# Patient Record
Sex: FEMALE | Race: WHITE | NOT HISPANIC OR LATINO | Employment: OTHER | URBAN - NONMETROPOLITAN AREA
[De-identification: names, ages, dates, MRNs, and addresses within clinical notes are randomized per-mention and may not be internally consistent; named-entity substitution may affect disease eponyms.]

---

## 2017-01-02 PROBLEM — I10 ESSENTIAL HYPERTENSION: Status: ACTIVE | Noted: 2017-01-02

## 2017-01-02 PROBLEM — E03.9 ACQUIRED HYPOTHYROIDISM: Status: ACTIVE | Noted: 2017-01-02

## 2017-02-09 DIAGNOSIS — C50.211 MALIGNANT NEOPLASM OF UPPER-INNER QUADRANT OF RIGHT FEMALE BREAST (HCC): Primary | ICD-10-CM

## 2017-02-10 ENCOUNTER — LAB (OUTPATIENT)
Dept: ONCOLOGY | Facility: CLINIC | Age: 62
End: 2017-02-10

## 2017-02-10 ENCOUNTER — OFFICE VISIT (OUTPATIENT)
Dept: ONCOLOGY | Facility: CLINIC | Age: 62
End: 2017-02-10

## 2017-02-10 VITALS
DIASTOLIC BLOOD PRESSURE: 72 MMHG | SYSTOLIC BLOOD PRESSURE: 122 MMHG | TEMPERATURE: 98.2 F | WEIGHT: 139.1 LBS | HEIGHT: 59 IN | OXYGEN SATURATION: 96 % | HEART RATE: 84 BPM | RESPIRATION RATE: 16 BRPM | BODY MASS INDEX: 28.04 KG/M2

## 2017-02-10 DIAGNOSIS — C50.211 MALIGNANT NEOPLASM OF UPPER-INNER QUADRANT OF RIGHT FEMALE BREAST (HCC): Primary | ICD-10-CM

## 2017-02-10 DIAGNOSIS — Z51.11 ENCOUNTER FOR ANTINEOPLASTIC CHEMOTHERAPY: ICD-10-CM

## 2017-02-10 DIAGNOSIS — C50.211 MALIGNANT NEOPLASM OF UPPER-INNER QUADRANT OF RIGHT FEMALE BREAST (HCC): ICD-10-CM

## 2017-02-10 DIAGNOSIS — I10 ESSENTIAL HYPERTENSION: Primary | ICD-10-CM

## 2017-02-10 LAB
ALBUMIN SERPL-MCNC: 4.5 G/DL (ref 3.5–5)
ALBUMIN/GLOB SERPL: 1.5 G/DL
ALP SERPL-CCNC: 69 U/L (ref 38–126)
ALT SERPL W P-5'-P-CCNC: 26 U/L (ref 9–52)
ANION GAP SERPL CALCULATED.3IONS-SCNC: 14 MMOL/L
AST SERPL-CCNC: 24 U/L (ref 5–40)
AUTO MIXED CELLS #: 0.5 10*3/UL (ref 0.1–1.5)
AUTO MIXED CELLS %: 4 % (ref 0.2–15.1)
BILIRUB SERPL-MCNC: 0.4 MG/DL (ref 0.2–1.3)
BUN BLD-MCNC: 24 MG/DL (ref 7–26)
BUN/CREAT SERPL: 30 (ref 7–25)
CALCIUM SPEC-SCNC: 9.4 MG/DL (ref 8.4–10.2)
CHLORIDE SERPL-SCNC: 102 MMOL/L (ref 98–107)
CO2 SERPL-SCNC: 29 MMOL/L (ref 22–30)
CREAT BLD-MCNC: 0.8 MG/DL (ref 0.7–1.4)
ERYTHROCYTE [DISTWIDTH] IN BLOOD BY AUTOMATED COUNT: 13.6 % (ref 11.5–14.5)
GFR SERPL CREATININE-BSD FRML MDRD: 72 ML/MIN/1.73
GLOBULIN UR ELPH-MCNC: 3 GM/DL
GLUCOSE BLD-MCNC: 119 MG/DL (ref 75–110)
HCT VFR BLD AUTO: 42.4 % (ref 37–47)
HGB BLD-MCNC: 12.9 G/DL (ref 12–16)
LYMPHOCYTES # BLD AUTO: 2.2 10*3/MM3 (ref 0.8–7)
LYMPHOCYTES NFR BLD AUTO: 18.2 % (ref 10–58.5)
MCH RBC QN AUTO: 30.2 PG (ref 27–31)
MCHC RBC AUTO-ENTMCNC: 30.4 G/DL (ref 33–37)
MCV RBC AUTO: 99.4 FL (ref 81–99)
NEUTROPHILS # BLD AUTO: 9.4 10*3/MM3 (ref 2–7.8)
NEUTROPHILS NFR BLD AUTO: 77.8 % (ref 37–92)
PLATELET # BLD AUTO: 287 10*3/MM3 (ref 130–400)
PMV BLD AUTO: 8.6 FL (ref 6–12)
POTASSIUM BLD-SCNC: 4.2 MMOL/L (ref 3.6–5)
PROT SERPL-MCNC: 7.5 G/DL (ref 6.3–8.2)
RBC # BLD AUTO: 4.27 10*6/MM3 (ref 4.2–5.4)
SODIUM BLD-SCNC: 145 MMOL/L (ref 137–145)
WBC NRBC COR # BLD: 12.1 10*3/MM3 (ref 4.8–10.8)

## 2017-02-10 PROCEDURE — 36415 COLL VENOUS BLD VENIPUNCTURE: CPT | Performed by: INTERNAL MEDICINE

## 2017-02-10 PROCEDURE — 85025 COMPLETE CBC W/AUTO DIFF WBC: CPT | Performed by: INTERNAL MEDICINE

## 2017-02-10 PROCEDURE — 80053 COMPREHEN METABOLIC PANEL: CPT | Performed by: INTERNAL MEDICINE

## 2017-02-10 PROCEDURE — 99213 OFFICE O/P EST LOW 20 MIN: CPT | Performed by: INTERNAL MEDICINE

## 2017-02-10 RX ORDER — CEFDINIR 300 MG/1
300 CAPSULE ORAL 2 TIMES DAILY
COMMUNITY
End: 2017-03-29

## 2017-02-10 RX ORDER — METHYLPREDNISOLONE 4 MG/1
4 TABLET ORAL DAILY
COMMUNITY
End: 2017-08-04

## 2017-02-10 NOTE — PROGRESS NOTES
[facility]  [facility] & [facility]Y        Subjective  Patient now doing a lot better but had a recent bout with pneumonia 3 weeks ago.  It was treated with Levaquin which she  remains on because of blocked ears and vertigo.  She may have to go back to work Monday.  Her daughter is a nurse practitioner down in primary care.  Her blood counts look good with a hemoglobin normal and a slight elevation in her white count.  The consistent with these recent infections.  We will get tumor markers as she desired to have that done 2.   was normal in the upper ranges.  Otherwise her breast cancer is under control and only her risk of infection seems to be a current issue.  She has close medical care so of course we are to be able to keep a close eye on her risk of recurrence.  VISIT DIAGNOSIS:   Encounter Diagnoses   Name Primary?   • Essential hypertension Yes   • Malignant neoplasm of upper-inner quadrant of right female breast    • Encounter for antineoplastic chemotherapy        REASON FOR VISIT:   No chief complaint on file.       HEMATOLOGY / ONCOLOGY HISTORY:    No history exists.       Cancer Staging Information:  No matching staging information was found for the patient.      INTERVAL HISTORY  Patient ID: Dayami Rossi is a 61 y.o. year old female is here today for follow-up.      Past Medical History:   Past Medical History   Diagnosis Date   • Arthritis    • Body mass index (BMI) of 24.0-24.9 in adult    • Breast cancer, right    • Depression    • Disease of thyroid gland    • Encounter for antineoplastic chemotherapy 9/18/2016   • H/O mammogram      bilateral - [facility]   • Hypertension    • Malignant neoplasm of upper-inner quadrant of right female breast 9/18/2016     Past Surgical History:   Past Surgical History   Procedure Laterality Date   • Breast surgery Right 08/20/2015      lumpectomy   • Breast surgery Right 09/03/2015     mastectomy   • Thyroid surgery     • Tubal  abdominal ligation     • Mastectomy Right      modified radical, Garry     Social History:   Social History     Social History   • Marital status:      Spouse name: N/A   • Number of children: N/A   • Years of education: N/A     Occupational History   • Not on file.     Social History Main Topics   • Smoking status: Current Every Day Smoker     Packs/day: 0.50     Years: 45.00     Types: Cigarettes   • Smokeless tobacco: Never Used   • Alcohol use No   • Drug use: No   • Sexual activity: Defer     Other Topics Concern   • Not on file     Social History Narrative     Family History:   Family History   Problem Relation Age of Onset   • Cancer Mother      bladder cancer   • Stroke Mother    • Hypertension Mother    • Thyroid disease Mother    • Cancer Father      prostate cancer   • Thyroid disease Sister    • Cancer Brother      lung cancer   • No Known Problems Daughter    • No Known Problems Son    • Heart disease Brother      heart attack   • No Known Problems Brother    • Thyroid disease Sister    • No Known Problems Maternal Grandmother    • No Known Problems Maternal Grandfather    • No Known Problems Paternal Grandmother    • Cancer Paternal Grandfather      prostate   • No Known Problems Brother        Review of Systems   Constitutional: Positive for fatigue.   HENT: Positive for congestion and ear pain.    Eyes: Positive for pain.   Respiratory: Positive for cough, shortness of breath and wheezing.    Gastrointestinal: Positive for nausea.   Musculoskeletal: Positive for joint swelling and neck pain.   Neurological: Positive for dizziness and weakness.   Psychiatric/Behavioral: Positive for sleep disturbance. The patient is nervous/anxious.    All other systems reviewed and are negative.       Performance Status:  Asymptomatic    Medications:    Current Outpatient Prescriptions   Medication Sig Dispense Refill   • amLODIPine (NORVASC) 5 MG tablet Take 5 mg by mouth daily.     • doxycycline  (VIBRAMYCIN) 100 MG capsule Take 100 mg by mouth 2 (Two) Times a Day.     • HYDROcodone-acetaminophen (NORCO)  MG per tablet Take 1 tablet by mouth every 8 (eight) hours as needed for moderate pain (4-6).     • letrozole (FEMARA) 2.5 MG tablet Take  by mouth daily.     • levoFLOXacin (LEVAQUIN) 500 MG tablet Take 500 mg by mouth Daily.     • levothyroxine (SYNTHROID, LEVOTHROID) 100 MCG tablet Take 100 mcg by mouth daily.     • nicotine (NICODERM CQ) 21 MG/24HR patch Place 1 patch on the skin every day.     • PARoxetine (PAXIL) 20 MG tablet Take 20 mg by mouth every morning.       No current facility-administered medications for this visit.        ALLERGIES:    Allergies   Allergen Reactions   • Erythromycin Base    • Penicillins        Objective      There were no vitals filed for this visit.  There were no vitals taken for this visit.    No flowsheet data found.      General Appearance:    Alert, cooperative, no distress, appears stated age   Head:    Normocephalic, without obvious abnormality, atraumatic   Eyes:    PERRL, conjunctiva/corneas clear, EOM's intact, fundi     benign, both eyes   Ears:    Normal TM's and external ear canals, both ears   Nose:   Nares normal, septum midline, mucosa normal, no drainage     or sinus tenderness   Throat:   Lips, mucosa, and tongue normal; teeth and gums normal   Neck:   Supple, symmetrical, trachea midline, no adenopathy;     thyroid:  no enlargement/tenderness/nodules; no carotid    bruit or JVD   Back:     Symmetric, no curvature, ROM normal, no CVA tenderness   Lungs:     Clear to auscultation bilaterally, respirations unlabored,rare cough and certainly no sign of SOB   Chest Wall:    No tenderness or deformity    Heart:    Regular rate and rhythm, S1 and S2 normal, no murmur, rub    or gallop   Abdomen:     Soft, non-tender, bowel sounds active all four quadrants,     no masses, no organomegaly   Extremities:   Extremities normal, atraumatic, no cyanosis or  edema   Pulses:   2+ and symmetric all extremities   Skin:   Skin color, texture, turgor normal, no rashes or lesions   Lymph nodes:   Cervical, supraclavicular, and axillary nodes normal   Neurologic:   CNII-XII intact, normal strength, sensation and reflexes     throughout       RECENT LABS:    No results found for: AMPHETSCREEN, BARBITSCNUR, LABBENZSCN, COCAINEUR, OSMOLALITY, PCPUR, THCSCNINP, LABMETHSCN     Results for orders placed or performed in visit on 11/11/16   CA 27.29 (Serial Monitor)   Result Value Ref Range    CA 27.29 35.8 0.0 - 38.6 U/mL   CEA   Result Value Ref Range    CEA 2.19 0.00 - 5.00 ng/mL           RADIOLOGY:  No results found.      Assessment/Plan     Patient Active Problem List   Diagnosis   • Malignant neoplasm of upper-inner quadrant of right female breast   • Encounter for antineoplastic chemotherapy   • Acquired hypothyroidism   • Essential hypertension        Diagnoses and all orders for this visit:    Essential hypertension    Malignant neoplasm of upper-inner quadrant of right female breast    Encounter for antineoplastic chemotherapy     Her problem with a possible lung infection seems to be waning.          Garry Remy MD    2/10/2017    1:07 PM

## 2017-02-11 LAB
CANCER AG27-29 SERPL-ACNC: 29.5 U/ML (ref 0–38.6)
CEA SERPL-MCNC: 2.01 NG/ML (ref 0–5)

## 2017-03-09 RX ORDER — AMLODIPINE BESYLATE 5 MG/1
5 TABLET ORAL DAILY
Qty: 5 TABLET | Refills: 1 | Status: SHIPPED | OUTPATIENT
Start: 2017-03-09 | End: 2017-03-13 | Stop reason: SDUPTHER

## 2017-03-09 NOTE — TELEPHONE ENCOUNTER
Received fax requesting refilling lasix from [facility], formerly [facility].  Discussed with Dr. Remy and order given to hold lasix for now.

## 2017-03-13 RX ORDER — AMLODIPINE BESYLATE 5 MG/1
5 TABLET ORAL DAILY
Qty: 30 TABLET | Refills: 1 | Status: SHIPPED | OUTPATIENT
Start: 2017-03-13 | End: 2017-04-04 | Stop reason: SDUPTHER

## 2017-03-29 ENCOUNTER — OFFICE VISIT (OUTPATIENT)
Dept: RADIATION ONCOLOGY | Facility: HOSPITAL | Age: 62
End: 2017-03-29

## 2017-03-29 VITALS
HEIGHT: 59 IN | SYSTOLIC BLOOD PRESSURE: 122 MMHG | DIASTOLIC BLOOD PRESSURE: 74 MMHG | WEIGHT: 139 LBS | BODY MASS INDEX: 28.02 KG/M2

## 2017-03-29 DIAGNOSIS — C50.211 MALIGNANT NEOPLASM OF UPPER-INNER QUADRANT OF RIGHT FEMALE BREAST (HCC): Primary | ICD-10-CM

## 2017-03-29 DIAGNOSIS — Z92.3 HX OF RADIATION THERAPY: ICD-10-CM

## 2017-03-29 DIAGNOSIS — Z08 ENCOUNTER FOR FOLLOW-UP EXAMINATION AFTER COMPLETED TREATMENT FOR CANCER: ICD-10-CM

## 2017-04-04 RX ORDER — AMLODIPINE BESYLATE 5 MG/1
5 TABLET ORAL DAILY
Qty: 90 TABLET | Refills: 3 | Status: SHIPPED | OUTPATIENT
Start: 2017-04-04

## 2017-04-17 PROBLEM — Z92.3 HX OF RADIATION THERAPY: Status: ACTIVE | Noted: 2017-04-17

## 2017-04-17 PROBLEM — Z08 ENCOUNTER FOR FOLLOW-UP EXAMINATION AFTER COMPLETED TREATMENT FOR CANCER: Status: ACTIVE | Noted: 2017-04-17

## 2017-04-19 ENCOUNTER — TELEPHONE (OUTPATIENT)
Dept: NEUROSURGERY | Age: 62
End: 2017-04-19

## 2017-05-11 ENCOUNTER — OFFICE VISIT (OUTPATIENT)
Dept: NEUROSURGERY | Age: 62
End: 2017-05-11
Payer: COMMERCIAL

## 2017-05-11 ENCOUNTER — PREP FOR PROCEDURE (OUTPATIENT)
Dept: NEUROSURGERY | Age: 62
End: 2017-05-11

## 2017-05-11 VITALS
SYSTOLIC BLOOD PRESSURE: 120 MMHG | DIASTOLIC BLOOD PRESSURE: 68 MMHG | WEIGHT: 144 LBS | HEIGHT: 60 IN | BODY MASS INDEX: 28.27 KG/M2 | OXYGEN SATURATION: 97 % | HEART RATE: 94 BPM

## 2017-05-11 DIAGNOSIS — M51.36 DDD (DEGENERATIVE DISC DISEASE), LUMBAR: ICD-10-CM

## 2017-05-11 DIAGNOSIS — M48.061 FORAMINAL STENOSIS OF LUMBAR REGION: Primary | ICD-10-CM

## 2017-05-11 DIAGNOSIS — M79.604 RIGHT LEG PAIN: ICD-10-CM

## 2017-05-11 DIAGNOSIS — R29.6 FREQUENT FALLS: ICD-10-CM

## 2017-05-11 DIAGNOSIS — M54.16 CHRONIC LUMBAR RADICULOPATHY: ICD-10-CM

## 2017-05-11 PROCEDURE — 99204 OFFICE O/P NEW MOD 45 MIN: CPT | Performed by: NEUROLOGICAL SURGERY

## 2017-05-11 RX ORDER — CALCIUM CARBONATE 500(1250)
500 TABLET ORAL 2 TIMES DAILY
COMMUNITY

## 2017-05-11 RX ORDER — LISINOPRIL 20 MG/1
20 TABLET ORAL DAILY
COMMUNITY

## 2017-05-11 RX ORDER — DULOXETIN HYDROCHLORIDE 30 MG/1
30 CAPSULE, DELAYED RELEASE ORAL DAILY
COMMUNITY

## 2017-05-11 RX ORDER — HYDROCODONE BITARTRATE AND ACETAMINOPHEN 10; 325 MG/1; MG/1
1 TABLET ORAL EVERY 6 HOURS PRN
Status: ON HOLD | COMMUNITY
End: 2017-05-24

## 2017-05-11 RX ORDER — AMLODIPINE BESYLATE 5 MG/1
5 TABLET ORAL DAILY
COMMUNITY

## 2017-05-11 RX ORDER — TEMAZEPAM 30 MG/1
30 CAPSULE ORAL DAILY
COMMUNITY

## 2017-05-11 RX ORDER — ALPRAZOLAM 1 MG/1
1 TABLET ORAL 2 TIMES DAILY
COMMUNITY

## 2017-05-11 RX ORDER — SODIUM CHLORIDE 0.9 % (FLUSH) 0.9 %
10 SYRINGE (ML) INJECTION PRN
Status: CANCELLED | OUTPATIENT
Start: 2017-05-11

## 2017-05-11 RX ORDER — SODIUM CHLORIDE, SODIUM LACTATE, POTASSIUM CHLORIDE, CALCIUM CHLORIDE 600; 310; 30; 20 MG/100ML; MG/100ML; MG/100ML; MG/100ML
INJECTION, SOLUTION INTRAVENOUS CONTINUOUS
Status: CANCELLED | OUTPATIENT
Start: 2017-05-11

## 2017-05-11 RX ORDER — SODIUM CHLORIDE 0.9 % (FLUSH) 0.9 %
10 SYRINGE (ML) INJECTION EVERY 12 HOURS SCHEDULED
Status: CANCELLED | OUTPATIENT
Start: 2017-05-11

## 2017-05-11 RX ORDER — SIMVASTATIN 10 MG
10 TABLET ORAL DAILY
COMMUNITY

## 2017-05-11 RX ORDER — LETROZOLE 2.5 MG/1
2.5 TABLET, FILM COATED ORAL DAILY
COMMUNITY
End: 2020-01-21 | Stop reason: SDUPTHER

## 2017-05-11 ASSESSMENT — ENCOUNTER SYMPTOMS
BACK PAIN: 1
HEMOPTYSIS: 0
SPUTUM PRODUCTION: 0
EYES NEGATIVE: 1
WHEEZING: 0
COUGH: 1
SHORTNESS OF BREATH: 0
GASTROINTESTINAL NEGATIVE: 1

## 2017-05-22 DIAGNOSIS — C50.211 MALIGNANT NEOPLASM OF UPPER-INNER QUADRANT OF RIGHT FEMALE BREAST (HCC): Primary | ICD-10-CM

## 2017-05-24 ENCOUNTER — HOSPITAL ENCOUNTER (OUTPATIENT)
Age: 62
Setting detail: OUTPATIENT SURGERY
Discharge: HOME | End: 2017-05-24
Attending: NEUROLOGICAL SURGERY | Admitting: NEUROLOGICAL SURGERY
Payer: COMMERCIAL

## 2017-05-24 ENCOUNTER — APPOINTMENT (OUTPATIENT)
Dept: GENERAL RADIOLOGY | Age: 62
End: 2017-05-24
Attending: NEUROLOGICAL SURGERY
Payer: COMMERCIAL

## 2017-05-24 ENCOUNTER — ANESTHESIA EVENT (OUTPATIENT)
Dept: OPERATING ROOM | Age: 62
End: 2017-05-24
Payer: COMMERCIAL

## 2017-05-24 ENCOUNTER — ANESTHESIA (OUTPATIENT)
Dept: OPERATING ROOM | Age: 62
End: 2017-05-24
Payer: COMMERCIAL

## 2017-05-24 VITALS
SYSTOLIC BLOOD PRESSURE: 118 MMHG | RESPIRATION RATE: 8 BRPM | TEMPERATURE: 98.6 F | DIASTOLIC BLOOD PRESSURE: 50 MMHG | OXYGEN SATURATION: 100 %

## 2017-05-24 VITALS
BODY MASS INDEX: 28.22 KG/M2 | DIASTOLIC BLOOD PRESSURE: 60 MMHG | HEIGHT: 59 IN | OXYGEN SATURATION: 97 % | RESPIRATION RATE: 16 BRPM | WEIGHT: 140 LBS | SYSTOLIC BLOOD PRESSURE: 102 MMHG | TEMPERATURE: 98 F | HEART RATE: 77 BPM

## 2017-05-24 LAB
ABO/RH: NORMAL
ANTIBODY SCREEN: NORMAL
GLUCOSE BLD-MCNC: 99 MG/DL (ref 70–99)
PERFORMED ON: NORMAL

## 2017-05-24 PROCEDURE — 2500000003 HC RX 250 WO HCPCS: Performed by: NEUROLOGICAL SURGERY

## 2017-05-24 PROCEDURE — 6360000002 HC RX W HCPCS

## 2017-05-24 PROCEDURE — 2580000003 HC RX 258: Performed by: NEUROLOGICAL SURGERY

## 2017-05-24 PROCEDURE — 86900 BLOOD TYPING SEROLOGIC ABO: CPT

## 2017-05-24 PROCEDURE — 86850 RBC ANTIBODY SCREEN: CPT

## 2017-05-24 PROCEDURE — 7100000011 HC PHASE II RECOVERY - ADDTL 15 MIN: Performed by: NEUROLOGICAL SURGERY

## 2017-05-24 PROCEDURE — 3600000015 HC SURGERY LEVEL 5 ADDTL 15MIN: Performed by: NEUROLOGICAL SURGERY

## 2017-05-24 PROCEDURE — 2720000001 HC MISC SURG SUPPLY STERILE $51-500: Performed by: NEUROLOGICAL SURGERY

## 2017-05-24 PROCEDURE — 7100000000 HC PACU RECOVERY - FIRST 15 MIN: Performed by: NEUROLOGICAL SURGERY

## 2017-05-24 PROCEDURE — 3700000001 HC ADD 15 MINUTES (ANESTHESIA): Performed by: NEUROLOGICAL SURGERY

## 2017-05-24 PROCEDURE — 2500000003 HC RX 250 WO HCPCS

## 2017-05-24 PROCEDURE — 63030 LAMOT DCMPRN NRV RT 1 LMBR: CPT | Performed by: NEUROLOGICAL SURGERY

## 2017-05-24 PROCEDURE — 3600000005 HC SURGERY LEVEL 5 BASE: Performed by: NEUROLOGICAL SURGERY

## 2017-05-24 PROCEDURE — 6360000002 HC RX W HCPCS: Performed by: NEUROLOGICAL SURGERY

## 2017-05-24 PROCEDURE — 72020 X-RAY EXAM OF SPINE 1 VIEW: CPT

## 2017-05-24 PROCEDURE — 7100000010 HC PHASE II RECOVERY - FIRST 15 MIN: Performed by: NEUROLOGICAL SURGERY

## 2017-05-24 PROCEDURE — 3209999900 FLUORO FOR SURGICAL PROCEDURES

## 2017-05-24 PROCEDURE — 36415 COLL VENOUS BLD VENIPUNCTURE: CPT

## 2017-05-24 PROCEDURE — 82948 REAGENT STRIP/BLOOD GLUCOSE: CPT

## 2017-05-24 PROCEDURE — 3700000000 HC ANESTHESIA ATTENDED CARE: Performed by: NEUROLOGICAL SURGERY

## 2017-05-24 PROCEDURE — 7100000001 HC PACU RECOVERY - ADDTL 15 MIN: Performed by: NEUROLOGICAL SURGERY

## 2017-05-24 PROCEDURE — 86901 BLOOD TYPING SEROLOGIC RH(D): CPT

## 2017-05-24 RX ORDER — FENTANYL CITRATE 50 UG/ML
50 INJECTION, SOLUTION INTRAMUSCULAR; INTRAVENOUS
Status: DISCONTINUED | OUTPATIENT
Start: 2017-05-24 | End: 2017-05-24 | Stop reason: HOSPADM

## 2017-05-24 RX ORDER — SODIUM CHLORIDE, SODIUM LACTATE, POTASSIUM CHLORIDE, CALCIUM CHLORIDE 600; 310; 30; 20 MG/100ML; MG/100ML; MG/100ML; MG/100ML
INJECTION, SOLUTION INTRAVENOUS CONTINUOUS
Status: DISCONTINUED | OUTPATIENT
Start: 2017-05-24 | End: 2017-05-24 | Stop reason: HOSPADM

## 2017-05-24 RX ORDER — LIDOCAINE HYDROCHLORIDE 10 MG/ML
1 INJECTION, SOLUTION EPIDURAL; INFILTRATION; INTRACAUDAL; PERINEURAL
Status: DISCONTINUED | OUTPATIENT
Start: 2017-05-24 | End: 2017-05-24 | Stop reason: HOSPADM

## 2017-05-24 RX ORDER — ROCURONIUM BROMIDE 10 MG/ML
INJECTION, SOLUTION INTRAVENOUS PRN
Status: DISCONTINUED | OUTPATIENT
Start: 2017-05-24 | End: 2017-05-24 | Stop reason: SDUPTHER

## 2017-05-24 RX ORDER — DIPHENHYDRAMINE HYDROCHLORIDE 50 MG/ML
12.5 INJECTION INTRAMUSCULAR; INTRAVENOUS
Status: DISCONTINUED | OUTPATIENT
Start: 2017-05-24 | End: 2017-05-24 | Stop reason: HOSPADM

## 2017-05-24 RX ORDER — DOCUSATE SODIUM 100 MG/1
100 CAPSULE, LIQUID FILLED ORAL 2 TIMES DAILY PRN
Qty: 30 CAPSULE | Refills: 1 | Status: SHIPPED | OUTPATIENT
Start: 2017-05-24

## 2017-05-24 RX ORDER — HYDROCODONE BITARTRATE AND ACETAMINOPHEN 10; 325 MG/1; MG/1
1 TABLET ORAL EVERY 6 HOURS PRN
Qty: 60 TABLET | Refills: 0 | Status: SHIPPED | OUTPATIENT
Start: 2017-05-24

## 2017-05-24 RX ORDER — MORPHINE SULFATE 4 MG/ML
4 INJECTION, SOLUTION INTRAMUSCULAR; INTRAVENOUS
Status: DISCONTINUED | OUTPATIENT
Start: 2017-05-24 | End: 2017-05-24 | Stop reason: HOSPADM

## 2017-05-24 RX ORDER — LIDOCAINE HYDROCHLORIDE 10 MG/ML
1 INJECTION, SOLUTION EPIDURAL; INFILTRATION; INTRACAUDAL; PERINEURAL ONCE
Status: COMPLETED | OUTPATIENT
Start: 2017-05-24 | End: 2017-05-24

## 2017-05-24 RX ORDER — MEPERIDINE HYDROCHLORIDE 50 MG/ML
12.5 INJECTION INTRAMUSCULAR; INTRAVENOUS; SUBCUTANEOUS EVERY 5 MIN PRN
Status: DISCONTINUED | OUTPATIENT
Start: 2017-05-24 | End: 2017-05-24 | Stop reason: HOSPADM

## 2017-05-24 RX ORDER — PROPOFOL 10 MG/ML
INJECTION, EMULSION INTRAVENOUS PRN
Status: DISCONTINUED | OUTPATIENT
Start: 2017-05-24 | End: 2017-05-24 | Stop reason: SDUPTHER

## 2017-05-24 RX ORDER — MORPHINE SULFATE 4 MG/ML
2 INJECTION, SOLUTION INTRAMUSCULAR; INTRAVENOUS EVERY 5 MIN PRN
Status: DISCONTINUED | OUTPATIENT
Start: 2017-05-24 | End: 2017-05-24 | Stop reason: HOSPADM

## 2017-05-24 RX ORDER — SODIUM CHLORIDE 0.9 % (FLUSH) 0.9 %
10 SYRINGE (ML) INJECTION PRN
Status: DISCONTINUED | OUTPATIENT
Start: 2017-05-24 | End: 2017-05-24 | Stop reason: HOSPADM

## 2017-05-24 RX ORDER — LABETALOL HYDROCHLORIDE 5 MG/ML
5 INJECTION, SOLUTION INTRAVENOUS EVERY 10 MIN PRN
Status: DISCONTINUED | OUTPATIENT
Start: 2017-05-24 | End: 2017-05-24 | Stop reason: HOSPADM

## 2017-05-24 RX ORDER — FENTANYL CITRATE 50 UG/ML
INJECTION, SOLUTION INTRAMUSCULAR; INTRAVENOUS PRN
Status: DISCONTINUED | OUTPATIENT
Start: 2017-05-24 | End: 2017-05-24 | Stop reason: SDUPTHER

## 2017-05-24 RX ORDER — DEXAMETHASONE SODIUM PHOSPHATE 10 MG/ML
INJECTION INTRAMUSCULAR; INTRAVENOUS PRN
Status: DISCONTINUED | OUTPATIENT
Start: 2017-05-24 | End: 2017-05-24 | Stop reason: SDUPTHER

## 2017-05-24 RX ORDER — SODIUM CHLORIDE 0.9 % (FLUSH) 0.9 %
10 SYRINGE (ML) INJECTION EVERY 12 HOURS SCHEDULED
Status: DISCONTINUED | OUTPATIENT
Start: 2017-05-24 | End: 2017-05-24 | Stop reason: HOSPADM

## 2017-05-24 RX ORDER — PROMETHAZINE HYDROCHLORIDE 25 MG/ML
6.25 INJECTION, SOLUTION INTRAMUSCULAR; INTRAVENOUS
Status: DISCONTINUED | OUTPATIENT
Start: 2017-05-24 | End: 2017-05-24 | Stop reason: HOSPADM

## 2017-05-24 RX ORDER — METHYLPREDNISOLONE ACETATE 40 MG/ML
INJECTION, SUSPENSION INTRA-ARTICULAR; INTRALESIONAL; INTRAMUSCULAR; SOFT TISSUE PRN
Status: DISCONTINUED | OUTPATIENT
Start: 2017-05-24 | End: 2017-05-24 | Stop reason: HOSPADM

## 2017-05-24 RX ORDER — MIDAZOLAM HYDROCHLORIDE 1 MG/ML
2 INJECTION INTRAMUSCULAR; INTRAVENOUS
Status: DISCONTINUED | OUTPATIENT
Start: 2017-05-24 | End: 2017-05-24 | Stop reason: HOSPADM

## 2017-05-24 RX ORDER — LIDOCAINE HYDROCHLORIDE 10 MG/ML
INJECTION, SOLUTION EPIDURAL; INFILTRATION; INTRACAUDAL; PERINEURAL PRN
Status: DISCONTINUED | OUTPATIENT
Start: 2017-05-24 | End: 2017-05-24 | Stop reason: SDUPTHER

## 2017-05-24 RX ORDER — SCOLOPAMINE TRANSDERMAL SYSTEM 1 MG/1
1 PATCH, EXTENDED RELEASE TRANSDERMAL
Status: DISCONTINUED | OUTPATIENT
Start: 2017-05-24 | End: 2017-05-24 | Stop reason: HOSPADM

## 2017-05-24 RX ORDER — HYDRALAZINE HYDROCHLORIDE 20 MG/ML
5 INJECTION INTRAMUSCULAR; INTRAVENOUS EVERY 10 MIN PRN
Status: DISCONTINUED | OUTPATIENT
Start: 2017-05-24 | End: 2017-05-24 | Stop reason: HOSPADM

## 2017-05-24 RX ORDER — MORPHINE SULFATE 4 MG/ML
4 INJECTION, SOLUTION INTRAMUSCULAR; INTRAVENOUS EVERY 5 MIN PRN
Status: DISCONTINUED | OUTPATIENT
Start: 2017-05-24 | End: 2017-05-24 | Stop reason: HOSPADM

## 2017-05-24 RX ORDER — METOCLOPRAMIDE HYDROCHLORIDE 5 MG/ML
10 INJECTION INTRAMUSCULAR; INTRAVENOUS
Status: DISCONTINUED | OUTPATIENT
Start: 2017-05-24 | End: 2017-05-24 | Stop reason: HOSPADM

## 2017-05-24 RX ORDER — ONDANSETRON 2 MG/ML
INJECTION INTRAMUSCULAR; INTRAVENOUS PRN
Status: DISCONTINUED | OUTPATIENT
Start: 2017-05-24 | End: 2017-05-24 | Stop reason: SDUPTHER

## 2017-05-24 RX ADMIN — PHENYLEPHRINE HYDROCHLORIDE 100 MCG: 10 INJECTION INTRAVENOUS at 12:42

## 2017-05-24 RX ADMIN — SUGAMMADEX 127 MG: 100 INJECTION, SOLUTION INTRAVENOUS at 13:45

## 2017-05-24 RX ADMIN — HYDROMORPHONE HYDROCHLORIDE 0.5 MG: 1 INJECTION, SOLUTION INTRAMUSCULAR; INTRAVENOUS; SUBCUTANEOUS at 13:55

## 2017-05-24 RX ADMIN — ROCURONIUM BROMIDE 20 MG: 10 INJECTION INTRAVENOUS at 12:42

## 2017-05-24 RX ADMIN — ROCURONIUM BROMIDE 10 MG: 10 INJECTION INTRAVENOUS at 12:17

## 2017-05-24 RX ADMIN — FENTANYL CITRATE 100 MCG: 50 INJECTION INTRAMUSCULAR; INTRAVENOUS at 12:04

## 2017-05-24 RX ADMIN — PROPOFOL 150 MG: 10 INJECTION, EMULSION INTRAVENOUS at 11:50

## 2017-05-24 RX ADMIN — ONDANSETRON HYDROCHLORIDE 4 MG: 2 INJECTION, SOLUTION INTRAVENOUS at 13:40

## 2017-05-24 RX ADMIN — SODIUM CHLORIDE, POTASSIUM CHLORIDE, SODIUM LACTATE AND CALCIUM CHLORIDE: 600; 310; 30; 20 INJECTION, SOLUTION INTRAVENOUS at 09:11

## 2017-05-24 RX ADMIN — LIDOCAINE HYDROCHLORIDE 50 MG: 10 INJECTION, SOLUTION EPIDURAL; INFILTRATION; INTRACAUDAL; PERINEURAL at 11:50

## 2017-05-24 RX ADMIN — DEXAMETHASONE SODIUM PHOSPHATE 10 MG: 10 INJECTION INTRAMUSCULAR; INTRAVENOUS at 12:01

## 2017-05-24 RX ADMIN — PHENYLEPHRINE HYDROCHLORIDE 100 MCG: 10 INJECTION INTRAVENOUS at 12:53

## 2017-05-24 RX ADMIN — ROCURONIUM BROMIDE 40 MG: 10 INJECTION INTRAVENOUS at 11:50

## 2017-05-24 RX ADMIN — LIDOCAINE HYDROCHLORIDE 1 ML: 10 INJECTION, SOLUTION EPIDURAL; INFILTRATION; INTRACAUDAL; PERINEURAL at 09:12

## 2017-05-24 RX ADMIN — FENTANYL CITRATE 100 MCG: 50 INJECTION INTRAMUSCULAR; INTRAVENOUS at 11:50

## 2017-05-24 RX ADMIN — PHENYLEPHRINE HYDROCHLORIDE 100 MCG: 10 INJECTION INTRAVENOUS at 13:20

## 2017-05-24 RX ADMIN — FENTANYL CITRATE 50 MCG: 50 INJECTION INTRAMUSCULAR; INTRAVENOUS at 11:59

## 2017-05-24 RX ADMIN — VANCOMYCIN HYDROCHLORIDE 1000 MG: 1 INJECTION, POWDER, LYOPHILIZED, FOR SOLUTION INTRAVENOUS at 12:02

## 2017-05-24 RX ADMIN — HYDROMORPHONE HYDROCHLORIDE 0.5 MG: 1 INJECTION, SOLUTION INTRAMUSCULAR; INTRAVENOUS; SUBCUTANEOUS at 13:35

## 2017-05-24 ASSESSMENT — PAIN - FUNCTIONAL ASSESSMENT
PAIN_FUNCTIONAL_ASSESSMENT: 0-10

## 2017-05-24 ASSESSMENT — ENCOUNTER SYMPTOMS: SHORTNESS OF BREATH: 1

## 2017-05-24 ASSESSMENT — PAIN DESCRIPTION - DESCRIPTORS: DESCRIPTORS: ACHING

## 2017-05-24 ASSESSMENT — PAIN DESCRIPTION - PAIN TYPE: TYPE: SURGICAL PAIN

## 2017-05-24 ASSESSMENT — PAIN DESCRIPTION - LOCATION: LOCATION: BACK

## 2017-05-24 ASSESSMENT — PAIN SCALES - GENERAL: PAINLEVEL_OUTOF10: 7

## 2017-05-30 ENCOUNTER — TELEPHONE (OUTPATIENT)
Dept: NEUROSURGERY | Age: 62
End: 2017-05-30

## 2017-05-31 ENCOUNTER — OFFICE VISIT (OUTPATIENT)
Dept: NEUROSURGERY | Age: 62
End: 2017-05-31

## 2017-05-31 VITALS
OXYGEN SATURATION: 97 % | SYSTOLIC BLOOD PRESSURE: 138 MMHG | BODY MASS INDEX: 27.21 KG/M2 | DIASTOLIC BLOOD PRESSURE: 86 MMHG | HEART RATE: 103 BPM | HEIGHT: 59 IN | WEIGHT: 135 LBS

## 2017-05-31 DIAGNOSIS — Z98.890 S/P LUMBAR MICRODISCECTOMY: Primary | ICD-10-CM

## 2017-05-31 PROCEDURE — 99024 POSTOP FOLLOW-UP VISIT: CPT | Performed by: NURSE PRACTITIONER

## 2017-05-31 ASSESSMENT — ENCOUNTER SYMPTOMS
GASTROINTESTINAL NEGATIVE: 1
HEMOPTYSIS: 0
EYES NEGATIVE: 1
SPUTUM PRODUCTION: 0
BACK PAIN: 0
COUGH: 0
SHORTNESS OF BREATH: 1
WHEEZING: 0

## 2017-06-14 ENCOUNTER — TELEPHONE (OUTPATIENT)
Dept: NEUROSURGERY | Age: 62
End: 2017-06-14

## 2017-06-26 ENCOUNTER — OFFICE VISIT (OUTPATIENT)
Dept: NEUROSURGERY | Age: 62
End: 2017-06-26

## 2017-06-26 VITALS
WEIGHT: 140 LBS | OXYGEN SATURATION: 97 % | HEIGHT: 59 IN | SYSTOLIC BLOOD PRESSURE: 132 MMHG | BODY MASS INDEX: 28.22 KG/M2 | HEART RATE: 99 BPM | DIASTOLIC BLOOD PRESSURE: 82 MMHG

## 2017-06-26 DIAGNOSIS — Z98.890 S/P LUMBAR MICRODISCECTOMY: Primary | ICD-10-CM

## 2017-06-26 PROCEDURE — 99024 POSTOP FOLLOW-UP VISIT: CPT | Performed by: NURSE PRACTITIONER

## 2017-06-26 RX ORDER — METHYLPREDNISOLONE 4 MG/1
4 TABLET ORAL
COMMUNITY

## 2017-06-26 RX ORDER — PAROXETINE HYDROCHLORIDE 20 MG/1
20 TABLET, FILM COATED ORAL
COMMUNITY

## 2017-06-26 RX ORDER — LEVOFLOXACIN 500 MG/1
500 TABLET, FILM COATED ORAL
COMMUNITY

## 2017-06-26 RX ORDER — NICOTINE 21 MG/24HR
1 PATCH, TRANSDERMAL 24 HOURS TRANSDERMAL
COMMUNITY
End: 2017-06-26

## 2017-07-06 DIAGNOSIS — C50.211 MALIGNANT NEOPLASM OF UPPER-INNER QUADRANT OF RIGHT FEMALE BREAST (HCC): Primary | ICD-10-CM

## 2017-07-11 ENCOUNTER — TELEPHONE (OUTPATIENT)
Dept: NEUROLOGY | Age: 62
End: 2017-07-11

## 2017-07-19 RX ORDER — LETROZOLE 2.5 MG/1
2.5 TABLET, FILM COATED ORAL DAILY
Qty: 90 TABLET | Refills: 3 | Status: SHIPPED | OUTPATIENT
Start: 2017-07-19

## 2017-08-03 DIAGNOSIS — C50.211 MALIGNANT NEOPLASM OF UPPER-INNER QUADRANT OF RIGHT FEMALE BREAST (HCC): Primary | ICD-10-CM

## 2017-08-04 ENCOUNTER — LAB (OUTPATIENT)
Dept: ONCOLOGY | Facility: CLINIC | Age: 62
End: 2017-08-04

## 2017-08-04 ENCOUNTER — OFFICE VISIT (OUTPATIENT)
Dept: ONCOLOGY | Facility: CLINIC | Age: 62
End: 2017-08-04

## 2017-08-04 VITALS
BODY MASS INDEX: 27.8 KG/M2 | TEMPERATURE: 97.6 F | HEIGHT: 59 IN | SYSTOLIC BLOOD PRESSURE: 124 MMHG | DIASTOLIC BLOOD PRESSURE: 76 MMHG | HEART RATE: 95 BPM | OXYGEN SATURATION: 96 % | WEIGHT: 137.9 LBS | RESPIRATION RATE: 17 BRPM

## 2017-08-04 DIAGNOSIS — C50.211 MALIGNANT NEOPLASM OF UPPER-INNER QUADRANT OF RIGHT FEMALE BREAST (HCC): Primary | ICD-10-CM

## 2017-08-04 LAB
ALBUMIN SERPL-MCNC: 4.8 G/DL (ref 3.5–5)
ALBUMIN/GLOB SERPL: 1.7 G/DL
ALP SERPL-CCNC: 96 U/L (ref 38–126)
ALT SERPL W P-5'-P-CCNC: 30 U/L (ref 9–52)
ANION GAP SERPL CALCULATED.3IONS-SCNC: 16 MMOL/L
AST SERPL-CCNC: 24 U/L (ref 5–40)
AUTO MIXED CELLS #: 0.7 10*3/MM3 (ref 0.1–1.5)
AUTO MIXED CELLS %: 6.9 % (ref 0.2–15.1)
BILIRUB SERPL-MCNC: 0.4 MG/DL (ref 0.2–1.3)
BUN BLD-MCNC: 12 MG/DL (ref 7–26)
BUN/CREAT SERPL: 17.1 (ref 7–25)
CALCIUM SPEC-SCNC: 9.9 MG/DL (ref 8.4–10.2)
CHLORIDE SERPL-SCNC: 102 MMOL/L (ref 98–107)
CO2 SERPL-SCNC: 26 MMOL/L (ref 22–30)
CREAT BLD-MCNC: 0.7 MG/DL (ref 0.7–1.4)
ERYTHROCYTE [DISTWIDTH] IN BLOOD BY AUTOMATED COUNT: 13.6 % (ref 11.5–14.5)
GFR SERPL CREATININE-BSD FRML MDRD: 84 ML/MIN/1.73
GLOBULIN UR ELPH-MCNC: 2.9 GM/DL
GLUCOSE BLD-MCNC: 170 MG/DL (ref 75–110)
HCT VFR BLD AUTO: 43.5 % (ref 37–47)
HGB BLD-MCNC: 14 G/DL (ref 12–16)
LYMPHOCYTES # BLD AUTO: 3.2 10*3/MM3 (ref 0.8–7)
LYMPHOCYTES NFR BLD AUTO: 33.6 % (ref 10–58.5)
MCH RBC QN AUTO: 31.7 PG (ref 27–31)
MCHC RBC AUTO-ENTMCNC: 32.2 G/DL (ref 33–37)
MCV RBC AUTO: 98.5 FL (ref 81–99)
NEUTROPHILS # BLD AUTO: 5.7 10*3/MM3 (ref 2–7.8)
NEUTROPHILS NFR BLD AUTO: 59.5 % (ref 37–92)
PLATELET # BLD AUTO: 384 10*3/MM3 (ref 130–400)
PMV BLD AUTO: 8.4 FL (ref 6–12)
POTASSIUM BLD-SCNC: 4.9 MMOL/L (ref 3.6–5)
PROT SERPL-MCNC: 7.7 G/DL (ref 6.3–8.2)
RBC # BLD AUTO: 4.42 10*6/MM3 (ref 4.2–5.4)
SODIUM BLD-SCNC: 144 MMOL/L (ref 137–145)
WBC NRBC COR # BLD: 9.6 10*3/MM3 (ref 4.8–10.8)

## 2017-08-04 PROCEDURE — 85025 COMPLETE CBC W/AUTO DIFF WBC: CPT | Performed by: INTERNAL MEDICINE

## 2017-08-04 PROCEDURE — 36415 COLL VENOUS BLD VENIPUNCTURE: CPT | Performed by: INTERNAL MEDICINE

## 2017-08-04 PROCEDURE — 99213 OFFICE O/P EST LOW 20 MIN: CPT | Performed by: INTERNAL MEDICINE

## 2017-08-04 PROCEDURE — 80053 COMPREHEN METABOLIC PANEL: CPT | Performed by: INTERNAL MEDICINE

## 2017-08-04 NOTE — PROGRESS NOTES
[facility]  HEMATOLOGY & ONCOLOGY        Subjective     VISIT DIAGNOSIS:   Encounter Diagnosis   Name Primary?   • Malignant neoplasm of upper-inner quadrant of right female breast Yes       REASON FOR VISIT:   No chief complaint on file.       HEMATOLOGY / ONCOLOGY HISTORY:   Oncology/Hematology History    60 year old female with onset of menses at age 11 and menopause at age 46. Patient previously used oral contraceptives stopped at age  29. Patient did not use hormonal replacement therapy. Her first child was born at age 29 and she had 2 children. There's no family history  of breast carcinoma, ovarian carcinoma, melanoma, pancreatic carcinoma. Her father  prostate carcinoma. One brother   of lung cancer  2015: Right breast lumpectomy  Pathology: Invasive ductal carcinoma, grade 3. Tumor size 4.5 cm in greatest dimension. Invasive carcinoma extends to at least one  peripheral surgical margin.  Breast Tumor Profile: ER: 92%? HI: 93%? HER2/  sandi: 2+ FISH. POSITIVE? Ki67:  22%? P 53: 16%  9/3/2015: Right breast, total mastectomy with axillary contents.  Pathology: 7 mm focus of residual infiltrating highgrade  ductal carcinoma lateral biopsy site. Margins negative. Metastatic carcinoma in 17  of 21 right axillary nodes  AJCC TNM pT2 pN3a M0 stage IIIc  INTERVAL HISTORY  Ms. Rossi is a 60yearold   female with a history of stage IIIC breast carcinoma, triple positive. She was treated with 1 cycle  of chemotherapy with Perjeta, Herceptin, docetaxel and carboplatin. Unfortunately, she had a significant reaction with respiratory failure  requiring intubation and ICU stay. Since then, she has not had any further chemotherapy and just continuing with Herceptin every 3 weeks  along with Femara. She did complete adjuvant radiation therapy without any complication.  She has been able to continue on with the adjuvant Herceptin therapy as well as Femara. She will complete a year  of therapy in November.  She has had an stable 2Decho  in June with grade 1 diastolic dysfunction and EF of 55%.        Malignant neoplasm of upper-inner quadrant of right female breast     Initial Diagnosis     Stage IIIC (T2, N0, M0) Malignant neoplasm of upper-inner quadrant of right female breast         8/8/2015 Imaging     Bilateral Screening Mammogram:  3.6cm malignant appearing mass in the 12:00 position of the right breast         8/20/2015 Procedure     Right Breast Excisional biopsy:  Invasive ductal carcinoma, grade 3  Invasive carcinoma measures 4.5cm in greatest dimension grossly  Invasive carcinoma extends to at least one peripheral surgical excision margin  ER, RI, Her-2/sandi +         9/3/2015 Surgery     Right modified radical mastectomy:  7mm focus of residual infiltrating high-grade ductal carcinoma lateral to the prior biopsy site  Closest surgical margin is the inferior margin at 4.1mm  Metastatic carcinoma in 17/21 right axillary lymph nodes         10/8/2015 Imaging     Bone Scan:  Degenerative changes with no evidence of metastatic disease         10/8/2015 Imaging     CT Head:  No acute intracranial process         10/8/2015 Imaging     CT Chest/Abd/Pelvis:  Postoperative changes with no acute intrathoracic, intra-abdominal, or intrapelvic process         12/15/2015 - 2/11/2016 Radiation     Radiation OncologyTreatment Course:  Dayami Rossi received 6040 cGy in 33 fractions to right chest wall.         8/29/2016 Imaging     Left Mammogram, Normal          Chemotherapy Treatment History     Treatment Plan: OP BREAST Trastuzumab Q21D (with loading dose)    Medications: trastuzumab (HERCEPTIN) 520 mg in sodium chloride 0.9 % 274.8182 mL chemo IVPB, 8 mg/kg, 17 of 17 cycles      Reason treatment was stopped:  [Plan is still active]     Cancer Staging Information:  Malignant neoplasm of upper-inner quadrant of right female breast    Staging form: Breast, AJCC V7      Clinical stage from  1/4/2016: Stage IIIC (T2, N3a, M0) - Signed by Marva Reid PA-C on 4/17/2017      INTERVAL HISTORY  Patient ID: Dayami Rossi is a 62 y.o. year old female         Review of Systems         Medications:    Current Outpatient Prescriptions   Medication Sig Dispense Refill   • amLODIPine (NORVASC) 5 MG tablet Take 1 tablet by mouth Daily. 90 tablet 3   • Calcium Citrate-Vitamin D (CALCIUM + D PO) Take 1 tablet by mouth Daily.     • HYDROcodone-acetaminophen (NORCO)  MG per tablet Take 1 tablet by mouth every 8 (eight) hours as needed for moderate pain (4-6).     • letrozole (FEMARA) 2.5 MG tablet Take 1 tablet by mouth Daily. 90 tablet 3   • levoFLOXacin (LEVAQUIN) 500 MG tablet Take 500 mg by mouth Daily.     • levothyroxine (SYNTHROID, LEVOTHROID) 100 MCG tablet Take 100 mcg by mouth daily.     • metFORMIN (GLUCOPHAGE) 500 MG tablet Take 500 mg by mouth 2 (Two) Times a Day With Meals.     • methylPREDNISolone (MEDROL) 4 MG tablet Take 4 mg by mouth Daily.     • nicotine (NICODERM CQ) 21 MG/24HR patch Place 1 patch on the skin every day.     • PARoxetine (PAXIL) 20 MG tablet Take 20 mg by mouth every morning.       No current facility-administered medications for this visit.        ALLERGIES:    Allergies   Allergen Reactions   • Erythromycin Base    • Penicillins        Objective      @VITALS    Current Status 2/10/2017   ECOG score 0       General Appearance: Patient is awake, alert, oriented and in no acute distress. Patient is welldeveloped, wellnourished, and appears stated age.  HEENT: Normocephalic. Sclerae clear, conjunctiva pink, extraocular movements intact, pupils, round, reactive to light and  accommodation. Mouth and throat are clear with moist oral mucosa.  NECK: Supple, no jugular venous distention, thyroid not enlarged.  LYMPH: No cervical, supraclavicular, axillary, or inguinal lymphadenopathy.  CHEST: Equal bilateral expansion, AP  diameter normal, resonant percussion note  LUNGS:  Good air movement, no rales, rhonchi, rubs or wheezes with auscultation  CARDIO: Regular sinus rhythm, no murmurs, gallops or rubs.  ABDOMEN: Nondistended, soft, No tenderness, no guarding, no rebound, No hepatosplenomegaly. No abdominal masses. Bowel sounds positive. No hernia  GENITALIA: Not examined.  BREASTS: Not examined.  MUSKEL: No joint swelling, decreased motion, or inflammation  EXTREMS: No edema, clubbing, cyanosis, No varicose veins.  NEURO: Grossly nonfocal, Gait is coordinated and smooth, Cognition is preserved.  SKIN: No rashes, no ecchymoses, no petechia.  PSYCH: Oriented to time, place and person. Memory is preserved. Mood and affect appear normal      RECENT LABS:  No visits with results within 7 Day(s) from this visit.  Latest known visit with results is:    Orders Only on 02/10/2017   Component Date Value Ref Range Status   • CEA 02/10/2017 2.01  0.00 - 5.00 ng/mL Final   • CA 27.29 02/10/2017 29.5  0.0 - 38.6 U/mL Final    Haylie Feusdaur/ACS methodology       RADIOLOGY:  No results found.         Assessment/Plan       Node positive high risk 17 out of 21 lymph nodes positive right-sided breast cancer year 2015 status postmastectomy and radiation.  She could only tolerate one dose of chemotherapy TCH when she ended up intubated, however thereafter she continued and completed 1 year of Herceptin. Currently on Femara.  Denies any new complaints any signs or symptoms suggestive of recurrent disease..  My examination essentially benign.  Last CA 27.29 and chemistries back in February 2017 were normal today's are pending.  CBC was normal today.  She continues to smoke and I have advised her to quit smoking.  She is at high risk of breast cancer.  Her tumor was ER/TN positive and HER-2/sandi gene positive                      Parish Whiteside MD    8/4/2017    1:27 PM

## 2017-08-05 LAB
CANCER AG27-29 SERPL-ACNC: 32.1 U/ML (ref 0–38.6)
CEA SERPL-MCNC: 2.71 NG/ML (ref 0–5)

## 2017-08-07 DIAGNOSIS — C50.211 MALIGNANT NEOPLASM OF UPPER-INNER QUADRANT OF RIGHT FEMALE BREAST (HCC): Primary | ICD-10-CM

## 2017-08-07 DIAGNOSIS — Z12.31 SCREENING MAMMOGRAM, ENCOUNTER FOR: Primary | ICD-10-CM

## 2017-08-16 ENCOUNTER — TELEPHONE (OUTPATIENT)
Dept: NEUROSURGERY | Age: 62
End: 2017-08-16

## 2017-08-17 ENCOUNTER — TELEPHONE (OUTPATIENT)
Dept: NEUROSURGERY | Age: 62
End: 2017-08-17

## 2017-08-28 ENCOUNTER — OFFICE VISIT (OUTPATIENT)
Dept: NEUROSURGERY | Age: 62
End: 2017-08-28
Payer: COMMERCIAL

## 2017-08-28 VITALS
DIASTOLIC BLOOD PRESSURE: 68 MMHG | HEART RATE: 88 BPM | OXYGEN SATURATION: 98 % | BODY MASS INDEX: 28.83 KG/M2 | WEIGHT: 143 LBS | SYSTOLIC BLOOD PRESSURE: 126 MMHG | HEIGHT: 59 IN

## 2017-08-28 DIAGNOSIS — Z98.890 S/P LUMBAR MICRODISCECTOMY: Primary | ICD-10-CM

## 2017-08-28 DIAGNOSIS — Z76.89 RETURN TO WORK EVALUATION: ICD-10-CM

## 2017-08-28 PROCEDURE — 99213 OFFICE O/P EST LOW 20 MIN: CPT | Performed by: NURSE PRACTITIONER

## 2017-09-29 ENCOUNTER — HOSPITAL ENCOUNTER (OUTPATIENT)
Dept: RADIATION ONCOLOGY | Facility: HOSPITAL | Age: 62
Setting detail: RADIATION/ONCOLOGY SERIES
End: 2017-09-29

## 2017-09-29 ENCOUNTER — OFFICE VISIT (OUTPATIENT)
Dept: RADIATION ONCOLOGY | Facility: HOSPITAL | Age: 62
End: 2017-09-29

## 2017-09-29 VITALS
DIASTOLIC BLOOD PRESSURE: 62 MMHG | HEIGHT: 59 IN | SYSTOLIC BLOOD PRESSURE: 118 MMHG | BODY MASS INDEX: 29.43 KG/M2 | WEIGHT: 146 LBS

## 2017-09-29 DIAGNOSIS — Z90.11 S/P RIGHT MASTECTOMY: ICD-10-CM

## 2017-09-29 DIAGNOSIS — C50.211 MALIGNANT NEOPLASM OF UPPER-INNER QUADRANT OF RIGHT FEMALE BREAST (HCC): Primary | ICD-10-CM

## 2017-09-29 DIAGNOSIS — Z92.3 HX OF RADIATION THERAPY: ICD-10-CM

## 2017-09-29 DIAGNOSIS — Z85.3 ENCOUNTER FOR FOLLOW-UP SURVEILLANCE OF BREAST CANCER: ICD-10-CM

## 2017-09-29 DIAGNOSIS — Z08 ENCOUNTER FOR FOLLOW-UP SURVEILLANCE OF BREAST CANCER: ICD-10-CM

## 2017-09-29 PROCEDURE — G0463 HOSPITAL OUTPT CLINIC VISIT: HCPCS | Performed by: RADIOLOGY

## 2017-09-29 RX ORDER — ALBUTEROL SULFATE 90 UG/1
2 AEROSOL, METERED RESPIRATORY (INHALATION) EVERY 4 HOURS PRN
COMMUNITY

## 2017-09-29 NOTE — PATIENT INSTRUCTIONS
-Keep appt with Dr. Whiteside in February  -Monthly breast/chest wall exams  -Return to Dr. Mills in one year

## 2017-09-29 NOTE — PROGRESS NOTES
[facility], P.SArmidaCMD Marva Mayers, JUSTINN, PA-C  ____________________________________________________________  [facility]  Department of Radiation Oncology  [address] [place] [id number]  Office:  [phone]  Fax: [phone]        DATE: 09/29/2017    PATIENT:   Dayami Rossi    1955                                 MEDICAL RECORD #:  [id number]                                                       Reason for Visit:    1. Malignant neoplasm of upper-inner quadrant of right female breast    2. Hx of radiation therapy- right chest wall 2016    3. S/P right mastectomy    4. Encounter for follow-up surveillance of breast cancer          BRIEF HISTORY:  Dayami Rossi  62 y.o.white female who is status post completion of adjuvant radiation therapy to the right chest wall/nodes following right modified radical mastectomy and adjuvant chemotherapy for Stage IIIC (T2 N3a M0) right breast cancer. Ms. Rossi returns for follow-up exam. She is doing well with no acute complaints. Does monthly breast,/chest wall exams.  Most recent mammogram of the left breast on 09/21/2017 was WNL. She is here today with her sister.    A routine screening mammogram on 8/8/15 at [facility] revealed a 3.6 cm mass at the 12:00 position of the right breast. She underwent excisional biopsy on 8/20/15 per Dr. Castañeda. Pathology was positive for IDC, grade 3, 4.5 cm grossly with positive surgical margins. Tumor Profile: ER/PA positive,HER-2/sandi positive. A right modified radical mastectomy was carried out. Pathology showed a 7 mm focus of residual high-grade invasive ducal carcinoma. Axillary dissection showed metastatic carcinoma in 17/21 right axillary lymph nodes.  Dr. Lawson planned postoperative chemo with Taxotere, carboplatin, Herceptin, and Perjeta x 6 cycles.. The patient experienced significant complications including acute respiratory failure following 1 cycle  adjuvant systemic therapy, and declined further chemotherapy. A plan for adjuvant radiation therapy, hormonal manipulation and targeted therapy with Herceptin was initiated. She received 6040 cGy to the right chest wall/regional nodes, completed 16.  She completed Herceptin in 2016, she continues Femara      ONCOLOGY HISTORY   Malignant neoplasm of upper-inner quadrant of right female breast    Staging form: Breast, AJCC V7    - Clinical stage from 2016: Stage IIIC (T2, N3a, M0) - Signed by Marva Reid PA-C on 2017     Oncology/Hematology History    60 year old female with onset of menses at age 11 and menopause at age 46. Patient previously used oral contraceptives stopped at age  29. Patient did not use hormonal replacement therapy. Her first child was born at age 29 and she had 2 children. There's no family history  of breast carcinoma, ovarian carcinoma, melanoma, pancreatic carcinoma. Her father  prostate carcinoma. One brother   of lung cancer  2015: Right breast lumpectomy  Pathology: Invasive ductal carcinoma, grade 3. Tumor size 4.5 cm in greatest dimension. Invasive carcinoma extends to at least one  peripheral surgical margin.  Breast Tumor Profile: ER: 92%? [place]: 93%? HER2/  sandi: 2+ FISH. POSITIVE? Ki67:  22%? P 53: 16%  9/3/2015: Right breast, total mastectomy with axillary contents.  Pathology: 7 mm focus of residual infiltrating highgrade  ductal carcinoma lateral biopsy site. Margins negative. Metastatic carcinoma in 17  of 21 right axillary nodes  AJCC TNM pT2 pN3a M0 stage IIIc  INTERVAL HISTORY  Ms. Rossi is a 60yearold   female with a history of stage IIIC breast carcinoma, triple positive. She was treated with 1 cycle  of chemotherapy with Perjeta, Herceptin, docetaxel and carboplatin. Unfortunately, she had a significant reaction with respiratory failure  requiring intubation and ICU stay. Since then, she has not had any further  chemotherapy and just continuing with Herceptin every 3 weeks  along with Femara. She did complete adjuvant radiation therapy without any complication.  She has been able to continue on with the adjuvant Herceptin therapy as well as Femara. She will complete a year of therapy in November.  She has had an stable 2Decho  in June with grade 1 diastolic dysfunction and EF of 55%.        Malignant neoplasm of upper-inner quadrant of right female breast     Initial Diagnosis     Stage IIIC (T2, N0, M0) Malignant neoplasm of upper-inner quadrant of right female breast         8/8/2015 Imaging     Bilateral Screening Mammogram:  3.6cm malignant appearing mass in the 12:00 position of the right breast         8/20/2015 Procedure     Right Breast Excisional biopsy:  Invasive ductal carcinoma, grade 3  Invasive carcinoma measures 4.5cm in greatest dimension grossly  Invasive carcinoma extends to at least one peripheral surgical excision margin  ER, [place], Her-2/sandi +         9/3/2015 Surgery     Right modified radical mastectomy:  7mm focus of residual infiltrating high-grade ductal carcinoma lateral to the prior biopsy site  Closest surgical margin is the inferior margin at 4.1mm  Metastatic carcinoma in 17/21 right axillary lymph nodes         10/8/2015 Imaging     Bone Scan:  Degenerative changes with no evidence of metastatic disease         10/8/2015 Imaging     CT Head:  No acute intracranial process         10/8/2015 Imaging     CT Chest/Abd/Pelvis:  Postoperative changes with no acute intrathoracic, intra-abdominal, or intrapelvic process         12/15/2015 - 2/11/2016 Radiation     Radiation OncologyTreatment Course:  Dayami Rossi received 6040 cGy in 33 fractions to right chest wall.         8/29/2016 Imaging     Left Mammogram, Normal          History obtained from  PATIENT, FAMILY and CHART     PAST MEDICAL HISTORY   Past Medical History:   Diagnosis Date   • Arthritis    • Arthritis    • Body mass index (BMI) of  24.0-24.9 in adult    • Breast cancer, right    • Depression    • Diabetes mellitus    • Disease of thyroid gland    • Encounter for antineoplastic chemotherapy 9/18/2016   • H/O mammogram     bilateral - [facility]   • History of radiation therapy 02/11/2016    6040 cGy to right breast   • Hypertension    • Malignant neoplasm of upper-inner quadrant of right female breast 9/18/2016      PAST SURGICAL HISTORY   Past Surgical History:   Procedure Laterality Date   • BACK SURGERY      bulging disc repair July 2017   • BREAST SURGERY Right 08/20/2015     lumpectomy   • BREAST SURGERY Right 09/03/2015    mastectomy   • MASTECTOMY Right     modified radical, Castañeda   • THYROID SURGERY     • TUBAL ABDOMINAL LIGATION        SOCIAL HISTORY   Social History   Substance Use Topics   • Smoking status: Current Every Day Smoker     Packs/day: 0.50     Years: 45.00     Types: Cigarettes   • Smokeless tobacco: Never Used   • Alcohol use No      ALLERGIES   Erythromycin base and Penicillins     MEDICATIONS   Current Outpatient Prescriptions   Medication Sig Dispense Refill   • albuterol (PROVENTIL HFA;VENTOLIN HFA) 108 (90 Base) MCG/ACT inhaler Inhale 2 puffs Every 4 (Four) Hours As Needed for Wheezing.     • amLODIPine (NORVASC) 5 MG tablet Take 1 tablet by mouth Daily. 90 tablet 3   • Calcium Citrate-Vitamin D (CALCIUM + D PO) Take 1 tablet by mouth Daily.     • HYDROcodone-acetaminophen (NORCO)  MG per tablet Take 1 tablet by mouth every 8 (eight) hours as needed for moderate pain (4-6).     • letrozole (FEMARA) 2.5 MG tablet Take 1 tablet by mouth Daily. 90 tablet 3   • levothyroxine (SYNTHROID, LEVOTHROID) 100 MCG tablet Take 100 mcg by mouth daily.     • metFORMIN (GLUCOPHAGE) 500 MG tablet Take 500 mg by mouth 2 (Two) Times a Day With Meals.     • PARoxetine (PAXIL) 20 MG tablet Take 20 mg by mouth every morning.     • nicotine (NICODERM CQ) 21 MG/24HR patch Place 1 patch on the skin every day.       No current  "facility-administered medications for this visit.         The following portions of the patient's history were reviewed and updated as appropriate: allergies, current medications, past family history, past medical history, past social history, past surgical history and problem list.    REVIEW OF SYSTEMS   Review of Systems   Constitutional: Positive for fatigue. Negative for activity change, appetite change, chills, diaphoresis, fever and unexpected weight change.   HENT: Negative.    Eyes:        Reading glasses     Respiratory: Negative.         COPD  Has Albuterol inhaler to use prn   Cardiovascular: Negative.    Gastrointestinal: Negative.    Endocrine: Negative.         Hot flashes  Femara   Genitourinary: Negative.    Musculoskeletal: Negative.         Back surgery in May 2017   Skin: Negative.    Allergic/Immunologic: Negative.    Neurological: Negative.    Hematological: Negative for adenopathy. Bruises/bleeds easily.   Psychiatric/Behavioral: Negative.         Paxil       PHYSICAL EXAM   VITAL SIGNS:   Vitals:    09/29/17 1010   BP: 118/62   Weight: 146 lb (66.2 kg)   Height: 59\" (149.9 cm)   PainSc: 0-No pain   Body mass index is 29.49 kg/(m^2).    General Appearance:    Alert, cooperative, no acute distress, appears stated age.   Vitals reviewed.   Head:    Normocephalic, without obvious abnormality, atraumatic   Eyes:    PERRL, conjunctiva clear       Nose:   Nares normal, septum midline, mucosa normal   Throat:   Lips, mucosa, and tongue normal; teeth and gums normal   Back:     Symmetric, no curvature, ROM normal, no CVA tenderness   Lungs:     Clear to auscultation bilaterally, respirations unlabored   Heart:    Regular rate and rhythm, S1 and S2 normal   Breasts:    right breast surgically absent, chest wall without mass, skin or nipple changes or axillary nodes, left breast normal without mass, skin or nipple changes or axillary nodes, post-mastectomy site well healed and free of suspicious changes, " risk and benefit of breast/chest wall self-exam was discussed.     Abdomen:    Soft, non-tender, bowel sounds active all four quadrants,     no masses, no organomegaly   Genitalia:    deferred   Extremities:   Extremities normal, atraumatic, no cyanosis or edema   Skin:   Skin color, texture, turgor normal, no rashes or lesions   Neurologic:   Normal strength, sensation and reflexes throughout                     Psych exam:   alert,oriented, normal situational behavior          PERTINENT LABS and IMAGING  No visits with results within 6 Week(s) from this visit.  Latest known visit with results is:    Office Visit on 08/04/2017   Component Date Value Ref Range Status   • CEA 08/04/2017 2.71  0.00 - 5.00 ng/mL Final   • CA 27.29 08/04/2017 32.1  0.0 - 38.6 U/mL Final    Haylie Centaur/ACS methodology         No Images in the past 120 days found..    Clinical Quality Measures               Pain Documented PQRS #131 Pain severity quantified; pain present, followup plan documented.  Right Chest Wall Tender               Care Plan: ADVANCED CARE PQRS #47 Care plan discussed, no care plan provided               Performance Status: ECOG (0) Fully active, able to carry on all predisease performance without restriction  TOBACCO SCREENING AND INTERVENTION  PQRS #226 Tobacco user, received tobacco cessation counseling. Current every day smoker 3-10 mintues spent counseling Has reduced Tobbacos use                Medications Documented PQRS #130 Medications documented               WEIGHT SCREENING/BMI  Calculated BMI within normal parameteres & documented     BREAST PQRS #71   Stage IC-IIIC, ER+ or MT+: yes  TMX/AI Stg IC-IIIC: Is documented/Prescried  AJCC Stage: IIIC  ER/MT Status: ER or MT Positive    ASSESSMENT AND PLAN   1. Malignant neoplasm of upper-inner quadrant of right female breast    2. Hx of radiation therapy- right chest wall 2016    3. S/P right mastectomy    4. Encounter for follow-up surveillance of breast  cancer       No orders of the defined types were placed in this encounter.      RECOMMENDATIONS:  Dayami Rossi presents to our clinic today without symptoms or evidence for recurrent or metastatic disease at this time.  Return in about 1 year (around 9/29/2018) for Office Visit, to discuss imaging results, for a routine follow up.     Patient Instructions   -Keep appt with Dr. Whiteside in February  -Monthly breast/chest wall exams  -Return to Dr. Mills in one year    Today, total time spent with this patient was 20 minutes and of that time, well over 50% was spent in counseling and coordination of care as follows: diagnosis, post-treatment coordination of care and radiation therapy in general  Marva Reid PA-C for Dr. Hector Mills  09/29/2017

## 2017-12-05 PROCEDURE — 88304 TISSUE EXAM BY PATHOLOGIST: CPT | Performed by: SURGERY

## 2017-12-06 ENCOUNTER — LAB REQUISITION (OUTPATIENT)
Dept: LAB | Facility: HOSPITAL | Age: 62
End: 2017-12-06

## 2017-12-06 DIAGNOSIS — Z00.00 ENCOUNTER FOR GENERAL ADULT MEDICAL EXAMINATION WITHOUT ABNORMAL FINDINGS: ICD-10-CM

## 2017-12-07 LAB
CYTO UR: NORMAL
LAB AP CASE REPORT: NORMAL
LAB AP CLINICAL INFORMATION: NORMAL
Lab: NORMAL
PATH REPORT.FINAL DX SPEC: NORMAL
PATH REPORT.GROSS SPEC: NORMAL

## 2018-09-07 ENCOUNTER — HOSPITAL ENCOUNTER (OUTPATIENT)
Dept: RADIATION ONCOLOGY | Facility: HOSPITAL | Age: 63
Setting detail: RADIATION/ONCOLOGY SERIES
End: 2018-09-07

## 2018-09-28 ENCOUNTER — OFFICE VISIT (OUTPATIENT)
Dept: RADIATION ONCOLOGY | Facility: HOSPITAL | Age: 63
End: 2018-09-28

## 2018-09-28 VITALS
DIASTOLIC BLOOD PRESSURE: 76 MMHG | HEIGHT: 59 IN | WEIGHT: 147 LBS | BODY MASS INDEX: 29.64 KG/M2 | SYSTOLIC BLOOD PRESSURE: 122 MMHG

## 2018-09-28 DIAGNOSIS — Z85.3 ENCOUNTER FOR FOLLOW-UP SURVEILLANCE OF BREAST CANCER: ICD-10-CM

## 2018-09-28 DIAGNOSIS — Z90.11 S/P RIGHT MASTECTOMY: ICD-10-CM

## 2018-09-28 DIAGNOSIS — Z92.3 HX OF RADIATION THERAPY: ICD-10-CM

## 2018-09-28 DIAGNOSIS — Z08 ENCOUNTER FOR FOLLOW-UP SURVEILLANCE OF BREAST CANCER: ICD-10-CM

## 2018-09-28 DIAGNOSIS — C77.3 CARCINOMA OF RIGHT BREAST METASTATIC TO AXILLARY LYMPH NODE (HCC): Primary | ICD-10-CM

## 2018-09-28 DIAGNOSIS — C50.911 CARCINOMA OF RIGHT BREAST METASTATIC TO AXILLARY LYMPH NODE (HCC): Primary | ICD-10-CM

## 2018-09-28 DIAGNOSIS — Z79.811 PROPHYLACTIC USE OF LETROZOLE (FEMARA): ICD-10-CM

## 2018-09-28 PROCEDURE — G0463 HOSPITAL OUTPT CLINIC VISIT: HCPCS | Performed by: RADIOLOGY

## 2018-09-28 RX ORDER — ALPRAZOLAM 1 MG/1
1 TABLET ORAL 2 TIMES DAILY PRN
COMMUNITY

## 2018-09-28 RX ORDER — VENLAFAXINE HYDROCHLORIDE 37.5 MG/1
37.5 CAPSULE, EXTENDED RELEASE ORAL DAILY
COMMUNITY
End: 2019-09-26

## 2018-09-28 NOTE — PROGRESS NOTES
[facility], P.SArmidaCMD Marva Mayers BSN, PA-C  ____________________________________________________________  [facility]  Department of Radiation Oncology  [address] [place] [id number]  Office:  [phone]  Fax: [phone]    DATE: 09/29/2017    PATIENT:   Dayami Rossi    1955                                 MEDICAL RECORD #:  [id number]                                                       Reason for Visit:    1. Carcinoma of right breast metastatic to axillary lymph node (CMS/HCC)    2. S/P right mastectomy    3. Prophylactic use of letrozole (Femara)    4. Hx of radiation therapy    5. Encounter for follow-up surveillance of breast cancer      BRIEF HISTORY:  Dayami Rossi  63 y.o.white female who is status post completion of adjuvant radiation therapy to the right chest wall/nodes following right modified radical mastectomy and adjuvant chemotherapy/radiation.  Ms. Rossi returns for follow-up exam. She is doing well with no acute complaints. Does monthly breast/chest wall exams.  Reports the expected occasional shooting pains in right chest.    Ms. 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 She was treated with 1 cycle of chemotherapy with Perjeta, Herceptin, docetaxel and carboplatin. Unfortunately, she had a significant reaction with respiratory failure  requiring intubation and ICU stay. Since then, she has not had any further chemotherapy. Completed Herceptin every 3 weeks along with Femara and completed adjuvant radiation therapy on 2/11/2016    A routine screening mammogram on 8/8/15 at [facility] revealed a 3.6 cm mass at the 12:00 position of the right breast.     She underwent Right breast lumpectomy on 8/20/15 per Dr. Castañeda. Pathology: Invasive ductal carcinoma, grade 3. Tumor size 4.5 cm in  greatest dimension. Invasive carcinoma extends to at least one peripheral surgical margin.  Breast Tumor Profile: ER: 92%? [place]: 93%? HER2/sandi: 2+ FISH. POSITIVE? Ki67:22%? P 53: 16%    9/3/2015: Right breast, total mastectomy with axillary contents.  Pathology: 7 mm focus of residual infiltrating highgrade  ductal carcinoma lateral biopsy site. Margins negative. Metastatic carcinoma in 17of 21 right axillary nodes  AJCC TNM pT2 pN3a M0 stage IIIc      Dr. Lawson planned postoperative chemo with Taxotere, carboplatin, Herceptin, and Perjeta x 6 cycles..     10/08/2015 Bone Scan - Negative   10/08/2015 CT Head- Negative    The patient experienced significant complications including acute respiratory failure following 1 cycle adjuvant systemic therapy, and declined further chemotherapy.     A plan for adjuvant radiation therapy, hormonal manipulation and targeted therapy with Herceptin was initiated.     She received radiation therapy, 6040 cGy to the right chest wall/regional nodes, completed 2/11/16.      She completed Herceptin in November 2016, she continues Femara    08/29/2016 Left Mammogram- Negative     09/21/2017 Left Mammogram- Negative    09/24/2018 Left Mammogram- Negative at [facility]    History obtained from  PATIENT, FAMILY and CHART     PAST MEDICAL HISTORY   Past Medical History:   Diagnosis Date   • Arthritis    • Arthritis    • Body mass index (BMI) of 24.0-24.9 in adult    • Breast cancer, right (CMS/HCC)    • Depression    • Diabetes mellitus (CMS/HCC)    • Disease of thyroid gland    • Encounter for antineoplastic chemotherapy 9/18/2016   • H/O mammogram     bilateral - [place]   • History of radiation therapy 02/11/2016    6040 cGy to right breast   • Hypertension    • Malignant neoplasm of upper-inner quadrant of right female breast (CMS/HCC) 9/18/2016          PAST SURGICAL HISTORY   Past Surgical History:   Procedure Laterality Date   • BACK SURGERY      bulging disc repair  July 2017   • BREAST SURGERY Right 08/20/2015     lumpectomy   • BREAST SURGERY Right 09/03/2015    mastectomy   • MASTECTOMY Right     modified radical, Castañeda   • THYROID SURGERY     • TUBAL ABDOMINAL LIGATION        SOCIAL HISTORY      Social History     Social History   • Marital status:      Social History Main Topics   • Smoking status: Current Every Day Smoker     Packs/day: 0.50     Years: 45.00     Types: Cigarettes   • Smokeless tobacco: Never Used   • Alcohol use No   • Drug use: No   • Sexual activity: Defer     Other Topics Concern   • Not on file     ALLERGIES   Erythromycin base and Penicillins     MEDICATIONS   Current Outpatient Prescriptions on File Prior to Visit   Medication Sig Dispense Refill   • albuterol (PROVENTIL HFA;VENTOLIN HFA) 108 (90 Base) MCG/ACT inhaler Inhale 2 puffs Every 4 (Four) Hours As Needed for Wheezing.     • amLODIPine (NORVASC) 5 MG tablet Take 1 tablet by mouth Daily. 90 tablet 3   • Calcium Citrate-Vitamin D (CALCIUM + D PO) Take 1 tablet by mouth Daily.     • letrozole (FEMARA) 2.5 MG tablet Take 1 tablet by mouth Daily. 90 tablet 3   • levothyroxine (SYNTHROID, LEVOTHROID) 100 MCG tablet Take 100 mcg by mouth daily.     • [DISCONTINUED] PARoxetine (PAXIL) 20 MG tablet Take 20 mg by mouth every morning.     • HYDROcodone-acetaminophen (NORCO)  MG per tablet Take 1 tablet by mouth every 8 (eight) hours as needed for moderate pain (4-6).     • metFORMIN (GLUCOPHAGE) 500 MG tablet Take 500 mg by mouth 2 (Two) Times a Day With Meals.     • nicotine (NICODERM CQ) 21 MG/24HR patch Place 1 patch on the skin every day.       No current facility-administered medications on file prior to visit.        No current facility-administered medications for this visit.         The following portions of the patient's history were reviewed and updated as appropriate: allergies, current medications, past family history, past medical history, past social history, past surgical  "history and problem list.    REVIEW OF SYSTEMS   Review of Systems   Constitutional: Negative for appetite change, fatigue and unexpected weight change.   HENT: Negative.    Eyes: Negative.    Respiratory: Negative.    Cardiovascular: Negative.    Gastrointestinal: Negative.    Endocrine: Negative.    Genitourinary: Negative.    Musculoskeletal: Positive for arthralgias.   Skin: Negative.    Allergic/Immunologic: Negative.    Neurological: Negative.    Hematological: Negative.    Psychiatric/Behavioral: Negative.      PHYSICAL EXAM   VITAL SIGNS:   Vitals:    09/28/18 1334   BP: 122/76   Weight: 66.7 kg (147 lb)   Height: 149.9 cm (59\")   PainSc: 0-No pain       General: awake, alert, oriented, in no acute distress, well developed, well nourished, alert and cooperative Vitals reviewed.  Head/Neck:  Normocephalic, without obvious abnormality, atraumatic.  The trachea is midline. Neck is supple without evidence of cervical adenopathy.   Nose/Sinuses:  Nares normal. Septum midline. Mucosa normal. No drainage or sinus tenderness.  Mouth/Throat:  Mucosa moist, no lesions; pharynx without erythema, edema or exudate.  Neck:  neck- supple, no mass, non-tender  Eyes: No gross abnormalities., PERRL and Sclera nonicteric no scleral jaundice or erythema.    Ears: Ears appear intact with no abnormalities noted, hearing grossly normal  Chest:  Normal expansion.Respiratory efforts are normal and unlabored, diffuse wheezing/rales noted.  Cardiovascular: Regular rate and rhythm without murmurs, rubs, or gallops.   Abdomen:  Soft, non-tender, normal bowel sounds; no bruits, organomegaly or masses. no CVA tenderness   Breast: right breast surgically absent, chest wall without mass, skin or nipple changes or axillary nodes, left breast normal without mass, skin or nipple changes or axillary nodes, post-mastectomy site well healed and free of suspicious changes, risk and benefit of breast/chest wall self-exam was " discussed.  Extremities:  CHOUDHARY well, warm to touch,no evidence of cyanosis or edema.  Lymphatics:  No evidence of adenopathy in the cervical, supraclavicular or axillaryareas.  Skin: No suspicious lesions or rashes of concern, skin color, texture, turgor are normal  Neurologic:  Alert and oriented x 3, gait normal., reflexes normal and symmetric, strength and sensation grossly normal  Psych: Mood and affect are appropriate for circumstance. Eye contact is appropriate.         BREAST PQRS #71   Stage IC-IIIC, ER+ or ME+: yes  TMX/AI Stg IC-IIIC: Is documented/Prescried  AJCC Stage: IIIC  ER/ME Status: ER or ME Positive    ASSESSMENT AND PLAN   1. Carcinoma of right breast metastatic to axillary lymph node (CMS/HCC)    2. S/P right mastectomy    3. Prophylactic use of letrozole (Femara)    4. Hx of radiation therapy    5. Encounter for follow-up surveillance of breast cancer      RECOMMENDATIONS: Ms. 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  Completed 1 dose of chemotherapy, Herceptin every 3 weeks along with Femara and completed adjuvant radiation therapy on 2/11/2016. Recent mammogram at [facility] was negative on the left breast. She is doing well, tolerating endocrine therapy ok, takes Effexor for the hot flashes.    She presents to our clinic today without symptoms or evidence for recurrent or metastatic disease on exam at this time. I have reviewed her records. Will continue with current plan of care, will see in one year or sooner if needed.    Patient Instructions   -Keep appt with  in November  -Monthly breast/chest wall exams  -Return to Dr. Mills in one year      I saw this patient while covering for Dr. Hector Mills, radiation oncologist.  Barrington Foley MD  09/28/2018

## 2019-09-09 NOTE — PROGRESS NOTES
Tobacco abuse  We talked about the importance of quitting smoking. Specifically, we discussed the risks related to tobacco including but not limited to risk of several types of cancer, cardiovascular disease, stroke, bad dentition, financial loss and so on. I advised the patient to quit and offered the resources such as nicotine patches or medications to help with the craving. We will follow-up on this during the next visit and continue to encourage on quitting this habit. FOLLOW UP:  1. Follow-up appointment given for 4 months  2. Follow-up with Dr. Carri Copeland as recommended  3.  Follow-up with other medical providers as recommended    Electronically signed by LILY Willis on 9/16/2019 at 12:16 PM

## 2019-09-10 ENCOUNTER — OFFICE VISIT (OUTPATIENT)
Dept: HEMATOLOGY | Age: 64
End: 2019-09-10
Payer: COMMERCIAL

## 2019-09-10 VITALS
DIASTOLIC BLOOD PRESSURE: 68 MMHG | OXYGEN SATURATION: 95 % | WEIGHT: 126 LBS | SYSTOLIC BLOOD PRESSURE: 118 MMHG | BODY MASS INDEX: 25.45 KG/M2 | HEART RATE: 86 BPM

## 2019-09-10 DIAGNOSIS — Z72.0 TOBACCO ABUSE: ICD-10-CM

## 2019-09-10 DIAGNOSIS — T45.1X5A AROMATASE INHIBITOR-ASSOCIATED ARTHRALGIA: ICD-10-CM

## 2019-09-10 DIAGNOSIS — M25.50 AROMATASE INHIBITOR-ASSOCIATED ARTHRALGIA: ICD-10-CM

## 2019-09-10 DIAGNOSIS — Z79.811 ENCOUNTER FOR MONITORING AROMATASE INHIBITOR THERAPY: Chronic | ICD-10-CM

## 2019-09-10 DIAGNOSIS — Z51.81 ENCOUNTER FOR MONITORING AROMATASE INHIBITOR THERAPY: Chronic | ICD-10-CM

## 2019-09-10 DIAGNOSIS — C50.911 INVASIVE DUCTAL CARCINOMA OF BREAST, FEMALE, RIGHT (HCC): Primary | Chronic | ICD-10-CM

## 2019-09-10 PROCEDURE — 99214 OFFICE O/P EST MOD 30 MIN: CPT | Performed by: NURSE PRACTITIONER

## 2019-09-16 PROBLEM — M25.50 AROMATASE INHIBITOR-ASSOCIATED ARTHRALGIA: Status: ACTIVE | Noted: 2019-09-16

## 2019-09-16 PROBLEM — Z51.81 ENCOUNTER FOR MONITORING AROMATASE INHIBITOR THERAPY: Chronic | Status: ACTIVE | Noted: 2019-09-16

## 2019-09-16 PROBLEM — Z72.0 TOBACCO ABUSE: Status: ACTIVE | Noted: 2019-09-16

## 2019-09-16 PROBLEM — T45.1X5A AROMATASE INHIBITOR-ASSOCIATED ARTHRALGIA: Status: ACTIVE | Noted: 2019-09-16

## 2019-09-16 PROBLEM — C50.911 INVASIVE DUCTAL CARCINOMA OF BREAST, FEMALE, RIGHT (HCC): Chronic | Status: ACTIVE | Noted: 2019-09-16

## 2019-09-16 PROBLEM — Z79.811 ENCOUNTER FOR MONITORING AROMATASE INHIBITOR THERAPY: Chronic | Status: ACTIVE | Noted: 2019-09-16

## 2019-09-16 ASSESSMENT — ENCOUNTER SYMPTOMS
EYE DISCHARGE: 0
WHEEZING: 0
RESPIRATORY NEGATIVE: 1
NAUSEA: 0
SORE THROAT: 0
GASTROINTESTINAL NEGATIVE: 1
SHORTNESS OF BREATH: 0
BACK PAIN: 0
EYE PAIN: 0
COUGH: 0
CONSTIPATION: 0
ABDOMINAL PAIN: 0
DIARRHEA: 0
EYE REDNESS: 0
BLOOD IN STOOL: 0
EYES NEGATIVE: 1
VOMITING: 0

## 2019-09-25 PROBLEM — F17.200 CURRENT EVERY DAY SMOKER: Status: ACTIVE | Noted: 2019-09-25

## 2019-09-26 ENCOUNTER — OFFICE VISIT (OUTPATIENT)
Dept: RADIATION ONCOLOGY | Facility: HOSPITAL | Age: 64
End: 2019-09-26

## 2019-09-26 ENCOUNTER — HOSPITAL ENCOUNTER (OUTPATIENT)
Dept: RADIATION ONCOLOGY | Facility: HOSPITAL | Age: 64
Setting detail: RADIATION/ONCOLOGY SERIES
End: 2019-09-26

## 2019-09-26 VITALS
BODY MASS INDEX: 25.95 KG/M2 | HEIGHT: 59 IN | WEIGHT: 128.7 LBS | DIASTOLIC BLOOD PRESSURE: 68 MMHG | SYSTOLIC BLOOD PRESSURE: 120 MMHG

## 2019-09-26 DIAGNOSIS — Z17.0 MALIGNANT NEOPLASM OF UPPER-INNER QUADRANT OF RIGHT BREAST IN FEMALE, ESTROGEN RECEPTOR POSITIVE (HCC): Primary | ICD-10-CM

## 2019-09-26 DIAGNOSIS — F17.200 CURRENT EVERY DAY SMOKER: ICD-10-CM

## 2019-09-26 DIAGNOSIS — Z92.3 HX OF RADIATION THERAPY: ICD-10-CM

## 2019-09-26 DIAGNOSIS — Z90.11 S/P RIGHT MASTECTOMY: ICD-10-CM

## 2019-09-26 DIAGNOSIS — C50.211 MALIGNANT NEOPLASM OF UPPER-INNER QUADRANT OF RIGHT BREAST IN FEMALE, ESTROGEN RECEPTOR POSITIVE (HCC): Primary | ICD-10-CM

## 2019-09-26 DIAGNOSIS — Z79.811 PROPHYLACTIC USE OF LETROZOLE (FEMARA): ICD-10-CM

## 2019-09-26 PROCEDURE — G0463 HOSPITAL OUTPT CLINIC VISIT: HCPCS | Performed by: RADIOLOGY

## 2019-09-26 RX ORDER — GABAPENTIN 600 MG/1
TABLET ORAL 3 TIMES DAILY
COMMUNITY

## 2019-09-26 RX ORDER — SIMVASTATIN 10 MG
10 TABLET ORAL NIGHTLY
COMMUNITY

## 2019-09-26 RX ORDER — ESCITALOPRAM OXALATE 10 MG/1
10 TABLET ORAL DAILY
COMMUNITY

## 2019-09-26 RX ORDER — GLIPIZIDE 10 MG/1
10 TABLET ORAL
COMMUNITY

## 2019-09-26 RX ORDER — TEMAZEPAM 30 MG/1
30 CAPSULE ORAL NIGHTLY PRN
COMMUNITY

## 2019-09-26 RX ORDER — POTASSIUM CHLORIDE 750 MG/1
10 TABLET, FILM COATED, EXTENDED RELEASE ORAL 2 TIMES DAILY
COMMUNITY

## 2019-09-26 NOTE — PROGRESS NOTES
[facility], [facility].SArmidaMD Marva Mayers BSN, PA-C  ____________________________________________________________  [facility]  Department of Radiation Oncology  [address] [place] [id number]  Office:  [phone]  Fax: [phone]    DATE: 09/26/2019    PATIENT:   Dayami Rossi    1955                                   MEDICAL RECORD #:  [id number]                                                       Reason for Visit: Dayami Rossi is a very pleasant 64 y.o. patient that has completed radiation therapy and returns to the clinic today for routine follow up exam. Reports that in the interval since last follow-up, she has had some difficulty with managing her sugars and in fact required hospitalization for uncontrolled glucose levels.  She has gone on to achieve good control through medication changes, dietary changes and weight loss.  She denies suspicious findings on left breast and right chest wall examinations and reports that a left-sided mammogram yesterday was reportedly within normal limits.  He also denies knowledge of nodules, masses elsewhere or new unexplained bone pain.  Continues follow-up with Dr. Acuna and reports continued adjuvant endocrine therapy.    HISTORY OF PRESENT ILLNESS  Ms. 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 She was treated with 1 cycle of chemotherapy with Perjeta, Herceptin, docetaxel and carboplatin. Unfortunately, she had a significant reaction with respiratory failure  requiring intubation and ICU stay. Since then, she has not had any further chemotherapy. Completed Herceptin every 3 weeks along with Femara and completed adjuvant radiation therapy on 2/11/2016    A routine screening mammogram on 8/8/15 at [facility] revealed a 3.6 cm mass at the 12:00 position of the right  breast.     She underwent Right breast lumpectomy on 8/20/15 per Dr. Castañeda. Pathology: Invasive ductal carcinoma, grade 3. Tumor size 4.5 cm in greatest dimension. Invasive carcinoma extends to at least one peripheral surgical margin.  Breast Tumor Profile: ER: 92%? MO: 93%? HER2/sandi: 2+ FISH. POSITIVE? Ki67:22%? P 53: 16%    9/3/2015: Right breast, total mastectomy with axillary contents.  Pathology: 7 mm focus of residual infiltrating highgrade  ductal carcinoma lateral biopsy site. Margins negative. Metastatic carcinoma in 17of 21 right axillary nodes  AJCC TNM pT2 pN3a M0 stage IIIc      Dr. Lawson planned postoperative chemo with Taxotere, carboplatin, Herceptin, and Perjeta x 6 cycles..     10/08/2015 Bone Scan - Negative     10/08/2015 CT Head- Negative    The patient experienced significant complications including acute respiratory failure following 1 cycle adjuvant systemic therapy, and declined further chemotherapy.     A plan for adjuvant radiation therapy, hormonal manipulation and targeted therapy with Herceptin was initiated.     She received radiation therapy, 6040 cGy to the right chest wall/regional nodes, completed 2/11/16.      She completed Herceptin in November 2016, she continues Femara    08/29/2016 Left Mammogram- Negative     09/21/2017 Left Mammogram- Negative    09/24/2018 Left Mammogram- Negative at [facility]    09/28/2018 - Appointment with  (Covering for ):  • Keep appt with  in November  • Monthly breast/chest wall exams  • Return to Dr. Mills in one year    09/10/2019 - Appointment with :  • Follow-up appointment given for 4 months  • Follow-up with Dr. Mills as recommended  • Follow-up with other medical providers as recommended    09/25/2019 - Left breast mammogram: (per patient was wnl but report not available for review at time of office visit)  •     History obtained from patient and chart.    PAST MEDICAL  HISTORY   Past Medical History:   Diagnosis Date   • Arthritis    • Arthritis    • Body mass index (BMI) of 24.0-24.9 in adult    • Breast cancer, right (CMS/HCC)    • Depression    • Diabetes mellitus (CMS/HCC)    • Disease of thyroid gland    • Encounter for antineoplastic chemotherapy 9/18/2016   • H/O mammogram     bilateral - Romero   • History of radiation therapy 02/11/2016    6040 cGy to right breast   • Hypertension    • Malignant neoplasm of upper-inner quadrant of right female breast (CMS/HCC) 9/18/2016      PAST SURGICAL HISTORY   Past Surgical History:   Procedure Laterality Date   • BACK SURGERY      bulging disc repair July 2017   • BREAST SURGERY Right 08/20/2015     lumpectomy   • BREAST SURGERY Right 09/03/2015    mastectomy   • MASTECTOMY Right     modified radical, Castañeda   • THYROID SURGERY     • TUBAL ABDOMINAL LIGATION        FAMILY HISTORY  family history includes Cancer in her mother; Heart disease in her brother; Hypertension in her mother; Lung cancer in her brother; No Known Problems in her brother, brother, daughter, maternal grandfather, maternal grandmother, paternal grandmother, and son; Prostate cancer in her father and paternal grandfather; Stroke in her mother; Thyroid disease in her mother, sister, and sister.     SOCIAL HISTORY   Social History     Tobacco Use   • Smoking status: Current Every Day Smoker     Packs/day: 0.50     Years: 45.00     Pack years: 22.50     Types: Cigarettes   • Smokeless tobacco: Never Used   • Tobacco comment: Thinking of ways to quit   Substance Use Topics   • Alcohol use: No   • Drug use: No      ALLERGIES  Erythromycin base and Penicillins     MEDICATIONS   Current Outpatient Medications   Medication Sig Dispense Refill   • ALPRAZolam (XANAX) 1 MG tablet Take 1 mg by mouth 2 (Two) Times a Day As Needed for Anxiety.     • amLODIPine (NORVASC) 5 MG tablet Take 1 tablet by mouth Daily. 90 tablet 3   • Calcium Citrate-Vitamin D (CALCIUM + D PO) Take  "1 tablet by mouth Daily.     • escitalopram (LEXAPRO) 10 MG tablet Take 10 mg by mouth Daily.     • gabapentin (NEURONTIN) 300 MG capsule Take 600 mg by mouth 3 (Three) Times a Day.     • glipizide (GLUCOTROL) 10 MG tablet Take 10 mg by mouth 2 (Two) Times a Day Before Meals.     • HYDROcodone-acetaminophen (NORCO)  MG per tablet Take 1 tablet by mouth every 8 (eight) hours as needed for moderate pain (4-6).     • letrozole (FEMARA) 2.5 MG tablet Take 1 tablet by mouth Daily. 90 tablet 3   • levothyroxine (SYNTHROID, LEVOTHROID) 88 MCG tablet Take 100 mcg by mouth Daily.     • potassium chloride (K-DUR) 10 MEQ CR tablet Take 10 mEq by mouth 2 (Two) Times a Day.     • simvastatin (ZOCOR) 10 MG tablet Take 10 mg by mouth Every Night.     • temazepam (RESTORIL) 30 MG capsule Take 30 mg by mouth At Night As Needed for Sleep.     • albuterol (PROVENTIL HFA;VENTOLIN HFA) 108 (90 Base) MCG/ACT inhaler Inhale 2 puffs Every 4 (Four) Hours As Needed for Wheezing.       No current facility-administered medications for this visit.       The following portions of the patient's history were reviewed and updated as appropriate: allergies, current medications, past family history, past medical history, past social history, past surgical history and problem list.    REVIEW OF SYSTEMS  Review of Systems   Constitutional: Negative.    HENT: Negative.    Eyes: Negative.    Respiratory: Negative.    Cardiovascular: Negative.    Gastrointestinal: Negative.    Endocrine: Negative.         Occasional hot flashes  Femara   Genitourinary: Negative.    Musculoskeletal: Negative.         Left hip pain  Arthritis   Skin: Negative.    Allergic/Immunologic: Negative.    Neurological: Negative.    Hematological: Negative for adenopathy. Bruises/bleeds easily.   Psychiatric/Behavioral: Negative.        PHYSICAL EXAM  VITAL SIGNS:   Vitals:    09/26/19 1444   BP: 120/68   Weight: 58.4 kg (128 lb 11.2 oz)   Height: 149.9 cm (59\")   PainSc:   4 "   PainLoc: Hip      General: Patient is ambulating under her own power and appears in no apparent distress.  Head normocephalic, atraumatic.  Nose and oropharynx without suspicious lesions seen or palpated.  Neck was supple without masses.  Cardiac examination revealed a regular rate and rhythm.  Lungs with scattered and distant occasional rales.  Lymph nodes: No palpable peripheral lymphadenopathy.  Breasts: Right chest wall with well-healed mastectomy scar and without overlying skin changes, nodules or masses palpated.  The left breast is without visible or palpable abnormality.  Abdomen is soft, nontender, nondistended with positive bowel sounds.  No masses or organomegaly is palpated.  Extremities without edema.  Logic cranial nerves II through XII grossly intact.  No gross motor or sensory deficits appreciated.    Performance Status: ECOG (1) Restricted in physically strenuous activity, ambulatory and able to do work of light nature    Clinical Quality Measures  -Pain Documented by Standardized Tool, FPS Dayami Rossi reports a pain score of .  Given her pain assessment as noted, treatment options were discussed and the following options were decided upon as a follow-up plan to address the patient's pain: 0    -Tobacco Use: Screening and Cessation Intervention Social History    Tobacco Use      Smoking status: Current Every Day Smoker        Packs/day: 0.50        Years: 45.00        Pack years: 22.5        Types: Cigarettes      Smokeless tobacco: Never Used      Tobacco comment: Thinking of ways to quit    BREAST PQRS #71   Stage IC-IIIC, ER+ or IL+: yes  TMX/AI Stg IC-IIIC: Is documented/Prescried  AJCC Stage: IIIC  ER/IL Status: ER or IL Positive    ASSESSMENT AND PLAN   1. Malignant neoplasm of upper-inner quadrant of right breast in female, estrogen receptor positive (CMS/HCC)    2. S/P right mastectomy    3. Prophylactic use of letrozole (Femara)    4. Hx of radiation therapy    5. Current every day  smoker      RECOMMENDATIONS:   Dayami Rossi is status post completion of radiation therapy to the breast and presents to our clinic today for routine follow up exam. Ms. 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 Completed 1 dose of chemotherapy, Herceptin every 3 weeks along with Femara and completed adjuvant radiation therapy on 2/11/2016.  She remains on Femara and is clinically without evidence of disease.    Last left breast mammogram on 09/25/19 was reportedly within normal limits but official report was not available for review at time of office visit and this will be requested.    There is no clinical evidence suggestive of local recurrence of cancer on exam at this time.  I have asked her to continue with self breast/chest wall examinations and report any suspicious findings to us.  Will follow up with her in 1 year or sooner if needed. I have asked her to call clinic for problems between now and next office visit.  She is also to continue follow-up with Dr. Acuna and her other healthcare providers as per their scheduling.    Dayami Rossi is a current cigarettes user.  She currently smokes 0.5 pack of cigarettes per day for a duration of 40 years. I have educated her on the risk of diseases from using tobacco products such as cancer, COPD and heart diease.  I advised her to quit and she is not willing to quit. I spent >10 minutes counseling the patient.      Todays appointment time was spent in counseling, coordination of care and surveillance related to patients diagnosis as well as radiation therapy possible and probable after effects.  I saw this patient in follow-up while covering for Dr. Hector Mills, radiation oncologist.  Barrington Foley MD  09/26/2019

## 2019-12-17 ENCOUNTER — OFFICE VISIT (OUTPATIENT)
Dept: NEUROSURGERY | Age: 64
End: 2019-12-17
Payer: MEDICARE

## 2019-12-17 VITALS
HEART RATE: 83 BPM | DIASTOLIC BLOOD PRESSURE: 59 MMHG | SYSTOLIC BLOOD PRESSURE: 106 MMHG | WEIGHT: 127 LBS | HEIGHT: 60 IN | BODY MASS INDEX: 24.94 KG/M2

## 2019-12-17 DIAGNOSIS — M54.42 CHRONIC MIDLINE LOW BACK PAIN WITH LEFT-SIDED SCIATICA: ICD-10-CM

## 2019-12-17 DIAGNOSIS — M51.37 DDD (DEGENERATIVE DISC DISEASE), LUMBOSACRAL: ICD-10-CM

## 2019-12-17 DIAGNOSIS — M48.061 LUMBAR FORAMINAL STENOSIS: Primary | ICD-10-CM

## 2019-12-17 DIAGNOSIS — M51.36 DDD (DEGENERATIVE DISC DISEASE), LUMBAR: ICD-10-CM

## 2019-12-17 DIAGNOSIS — M54.16 LEFT LUMBAR RADICULOPATHY: ICD-10-CM

## 2019-12-17 DIAGNOSIS — G89.29 CHRONIC MIDLINE LOW BACK PAIN WITH LEFT-SIDED SCIATICA: ICD-10-CM

## 2019-12-17 PROCEDURE — 4004F PT TOBACCO SCREEN RCVD TLK: CPT | Performed by: NURSE PRACTITIONER

## 2019-12-17 PROCEDURE — G8417 CALC BMI ABV UP PARAM F/U: HCPCS | Performed by: NURSE PRACTITIONER

## 2019-12-17 PROCEDURE — G8427 DOCREV CUR MEDS BY ELIG CLIN: HCPCS | Performed by: NURSE PRACTITIONER

## 2019-12-17 PROCEDURE — 4040F PNEUMOC VAC/ADMIN/RCVD: CPT | Performed by: NURSE PRACTITIONER

## 2019-12-17 PROCEDURE — G8484 FLU IMMUNIZE NO ADMIN: HCPCS | Performed by: NURSE PRACTITIONER

## 2019-12-17 PROCEDURE — 3017F COLORECTAL CA SCREEN DOC REV: CPT | Performed by: NURSE PRACTITIONER

## 2019-12-17 PROCEDURE — G8400 PT W/DXA NO RESULTS DOC: HCPCS | Performed by: NURSE PRACTITIONER

## 2019-12-17 PROCEDURE — 99214 OFFICE O/P EST MOD 30 MIN: CPT | Performed by: NURSE PRACTITIONER

## 2019-12-17 PROCEDURE — 1123F ACP DISCUSS/DSCN MKR DOCD: CPT | Performed by: NURSE PRACTITIONER

## 2019-12-17 PROCEDURE — 1090F PRES/ABSN URINE INCON ASSESS: CPT | Performed by: NURSE PRACTITIONER

## 2019-12-17 ASSESSMENT — ENCOUNTER SYMPTOMS
EYES NEGATIVE: 1
GASTROINTESTINAL NEGATIVE: 1
RESPIRATORY NEGATIVE: 1

## 2020-01-17 VITALS
DIASTOLIC BLOOD PRESSURE: 78 MMHG | BODY MASS INDEX: 29.84 KG/M2 | SYSTOLIC BLOOD PRESSURE: 138 MMHG | WEIGHT: 148 LBS | HEART RATE: 97 BPM | HEIGHT: 59 IN

## 2020-01-21 ENCOUNTER — OFFICE VISIT (OUTPATIENT)
Dept: HEMATOLOGY | Age: 65
End: 2020-01-21
Payer: MEDICARE

## 2020-01-21 VITALS
DIASTOLIC BLOOD PRESSURE: 68 MMHG | HEIGHT: 59 IN | SYSTOLIC BLOOD PRESSURE: 132 MMHG | BODY MASS INDEX: 25.2 KG/M2 | WEIGHT: 125 LBS | RESPIRATION RATE: 16 BRPM | HEART RATE: 95 BPM

## 2020-01-21 PROCEDURE — G8400 PT W/DXA NO RESULTS DOC: HCPCS | Performed by: INTERNAL MEDICINE

## 2020-01-21 PROCEDURE — G8417 CALC BMI ABV UP PARAM F/U: HCPCS | Performed by: INTERNAL MEDICINE

## 2020-01-21 PROCEDURE — 3017F COLORECTAL CA SCREEN DOC REV: CPT | Performed by: INTERNAL MEDICINE

## 2020-01-21 PROCEDURE — 1090F PRES/ABSN URINE INCON ASSESS: CPT | Performed by: INTERNAL MEDICINE

## 2020-01-21 PROCEDURE — 1123F ACP DISCUSS/DSCN MKR DOCD: CPT | Performed by: INTERNAL MEDICINE

## 2020-01-21 PROCEDURE — G8484 FLU IMMUNIZE NO ADMIN: HCPCS | Performed by: INTERNAL MEDICINE

## 2020-01-21 PROCEDURE — 99213 OFFICE O/P EST LOW 20 MIN: CPT | Performed by: INTERNAL MEDICINE

## 2020-01-21 PROCEDURE — 4004F PT TOBACCO SCREEN RCVD TLK: CPT | Performed by: INTERNAL MEDICINE

## 2020-01-21 PROCEDURE — 4040F PNEUMOC VAC/ADMIN/RCVD: CPT | Performed by: INTERNAL MEDICINE

## 2020-01-21 PROCEDURE — G8427 DOCREV CUR MEDS BY ELIG CLIN: HCPCS | Performed by: INTERNAL MEDICINE

## 2020-01-21 RX ORDER — LETROZOLE 2.5 MG/1
2.5 TABLET, FILM COATED ORAL DAILY
Qty: 90 TABLET | Refills: 3 | Status: SHIPPED | OUTPATIENT
Start: 2020-01-21 | End: 2020-08-19 | Stop reason: SDUPTHER

## 2020-01-21 RX ORDER — GLIPIZIDE 5 MG/1
5 TABLET ORAL
COMMUNITY
End: 2020-03-31

## 2020-01-21 RX ORDER — LEVOTHYROXINE SODIUM 88 UG/1
88 TABLET ORAL DAILY
COMMUNITY

## 2020-01-21 NOTE — PROGRESS NOTES
every 6 hours as needed for Pain . 60 tablet 0    docusate sodium (COLACE) 100 MG capsule Take 1 capsule by mouth 2 times daily as needed for Constipation 30 capsule 1    amLODIPine (NORVASC) 5 MG tablet Take 5 mg by mouth daily      simvastatin (ZOCOR) 10 MG tablet Take 10 mg by mouth daily      lisinopril (PRINIVIL;ZESTRIL) 20 MG tablet Take 20 mg by mouth daily      temazepam (RESTORIL) 30 MG capsule Take 30 mg by mouth daily      ALPRAZolam (XANAX) 1 MG tablet Take 1 mg by mouth 2 times daily      PARoxetine (PAXIL) 20 MG tablet Take 20 mg by mouth      methylPREDNISolone (MEDROL) 4 MG tablet Take 4 mg by mouth      levofloxacin (LEVAQUIN) 500 MG tablet Take 500 mg by mouth      metFORMIN (GLUCOPHAGE) 500 MG tablet Take 500 mg by mouth 2 times daily (with meals)      Cyclobenzaprine HCl-Liniment (CYCLOBENZAPRINE COMFORT PAC) 10 MG KIT by Combination route daily      DULoxetine (CYMBALTA) 30 MG extended release capsule Take 30 mg by mouth daily      calcium carbonate (OSCAL) 500 MG TABS tablet Take 500 mg by mouth 2 times daily       No current facility-administered medications for this visit. REVIEW OF SYSTEMS:    Constitutional: no fever, no night sweats, no fatigue;   HEENT: no blurring of vision, no double vision, no hearing difficulty, no tinnitus,no ulceration, no dysphagia  Lungs: no cough, no shortness of breath, no wheeze;   CVS: no palpitation, no chest pain, no shortness of breath;  GI: no abdominal pain, no nausea , no vomiting, no constipation;   LINDSAY: no dysuria, frequency and urgency, no hematuria, no kidney stones;   Musculoskeletal: no joint pain, swelling , stiffness;   Endocrine: no polyuria, polydypsia, no cold or heat intolerence; Hematology/lymphatic: no easy brusing or bleeding, no hx of clotting disorder; no peripheral adenopathy.   Dermatology: no skin rash, no eczema, no pruritis;   Psychiatry: no depression, no anxiety,no panic attacks, no suicide ideation; Neurology: no syncope, no seizures, no numbness or tingling of hands, no numbness or tingling of feet, no paresis;     PHYSICAL EXAM:    Vitals signs:  /68   Pulse 95   Resp 16   Ht 4' 11\" (1.499 m)   Wt 125 lb (56.7 kg)   BMI 25.25 kg/m²    Pain scale:  Pain Score:   4     CONSTITUTIONAL: Alert, appropriate, no acute distress,   EYES: Non icteric, EOM intact, pupils equal round and reactive to light and accommodation. ENT: Oral mucus membranes moist, no oral pharyngeal lesions. External inspection of ears and nose are normal.   NECK: Supple, no masses. No palpable thyroid mass    CHEST/LUNGS: CTA bilaterally, normal respiratory effort   CARDIOVASCULAR: RRR, no murmurs. No lower extremity edema   ABDOMEN: soft non-tender, active bowel sounds, no hepatosplenomegaly. No palpable masses. EXTREMITIES: warm, Full ROM of all fours extremities. No focal weakness. SKIN: warm, dry with no rashes or lesions  LYMPH: No cervical, clavicular, axillary, or inguinal lymphadenopathy  NEUROLOGIC: follows commands, non focal.   PSYCH: mood and affect appropriate. Alert and oriented to time and place and person. Relevant Lab findings/reviewed by me:  Tumor markers within normal limits. Relevant Imaging studies/reviewed by me:  Mammogram December 2018-unremarkable mammogram category 1.      ASSESSMENT    Orders Placed This Encounter   Procedures    CBC Auto Differential     Standing Status:   Future     Standing Expiration Date:   1/21/2021    Comprehensive Metabolic Panel     Standing Status:   Future     Standing Expiration Date:   1/21/2021    Cancer Antigen 15-3     Standing Status:   Future     Standing Expiration Date:   1/21/2021    Cancer Antigen 27.29     Standing Status:   Future     Standing Expiration Date:   1/21/2021      Gustavo Delgado was seen today for breast cancer.     Diagnoses and all orders for this visit:    Invasive ductal carcinoma of breast, female, right ([facility] [address]  -     CBC Auto Differential; Future  -     Comprehensive Metabolic Panel; Future  -     Cancer Antigen 15-3; Future  -     Cancer Antigen 27.29; Future    Encounter for monitoring aromatase inhibitor therapy    Tobacco abuse    Other orders  -     letrozole (FEMARA) 2.5 MG tablet; Take 1 tablet by mouth daily           Invasive ductal carcinoma of the right breast, , stage IIIc with 17 of 21 lymph nodes positive, ER, LA and HER-2/liana positive. Only received 1 cycle of TCH due to toxicity. Completed adjuvant radiation therapy. Continues with adjuvant endocrine therapy with Femara. Anticipated total of 10 year dosing, December 2025.  - CMP, CBC and tumor markers prior to 4 month follow-up  - continue Femara     Encounter for monitoring aromatase inhibitor therapy     Aromatase inhibitor therapy-associated arthralgia. Presently tolerable and declines need for intervention at this time. Tobacco abuse  We talked about the importance of quitting smoking. Specifically, we discussed the risks related to tobacco including but not limited to risk of several types of cancer, cardiovascular disease, stroke, bad dentition, financial loss and so on. I advised the patient to quit and offered the resources such as nicotine patches or medications to help with the craving. We will follow-up on this during the next visit and continue to encourage on quitting this habit.     FOLLOW UP:  1. Follow-up appointment given for 4 months  2. Repeat cancer markers at follow-up  3. Follow-up with other medical providers as recommended       Follow Up:     Return in about 6 months (around 7/21/2020) for an chanel with Dr. Sara Hardy. Data Lucia Dooley am scribing for Rosmery Suero MD. Electronically signed by Darrell Dooley on 1/21/2020 at 4:56 PM.     I, Dr Lashell Carcamo, personally performed the services described in this documentation as scribed by Darrell Dooley MA in my presence and is both accurate and complete.   Over 50% of the

## 2020-02-05 ENCOUNTER — HOSPITAL ENCOUNTER (OUTPATIENT)
Dept: CT IMAGING | Age: 65
Discharge: HOME | End: 2020-02-05
Payer: MEDICARE

## 2020-02-05 PROCEDURE — 71250 CT THORAX DX C-: CPT

## 2020-03-17 ENCOUNTER — HOSPITAL ENCOUNTER (OUTPATIENT)
Dept: MRI IMAGING | Age: 65
Discharge: HOME | End: 2020-03-17
Payer: MEDICARE

## 2020-03-17 ENCOUNTER — OFFICE VISIT (OUTPATIENT)
Dept: NEUROSURGERY | Age: 65
End: 2020-03-17
Payer: MEDICARE

## 2020-03-17 VITALS
DIASTOLIC BLOOD PRESSURE: 60 MMHG | WEIGHT: 130 LBS | BODY MASS INDEX: 26.21 KG/M2 | HEIGHT: 59 IN | HEART RATE: 80 BPM | SYSTOLIC BLOOD PRESSURE: 121 MMHG

## 2020-03-17 LAB
GFR NON-AFRICAN AMERICAN: 55
PERFORMED ON: ABNORMAL
POC CREATININE: 1 MG/DL (ref 0.3–1.3)
POC SAMPLE TYPE: ABNORMAL

## 2020-03-17 PROCEDURE — G8484 FLU IMMUNIZE NO ADMIN: HCPCS | Performed by: NURSE PRACTITIONER

## 2020-03-17 PROCEDURE — 72158 MRI LUMBAR SPINE W/O & W/DYE: CPT

## 2020-03-17 PROCEDURE — 1123F ACP DISCUSS/DSCN MKR DOCD: CPT | Performed by: NURSE PRACTITIONER

## 2020-03-17 PROCEDURE — 4040F PNEUMOC VAC/ADMIN/RCVD: CPT | Performed by: NURSE PRACTITIONER

## 2020-03-17 PROCEDURE — G8417 CALC BMI ABV UP PARAM F/U: HCPCS | Performed by: NURSE PRACTITIONER

## 2020-03-17 PROCEDURE — A9577 INJ MULTIHANCE: HCPCS | Performed by: NURSE PRACTITIONER

## 2020-03-17 PROCEDURE — 3017F COLORECTAL CA SCREEN DOC REV: CPT | Performed by: NURSE PRACTITIONER

## 2020-03-17 PROCEDURE — 1090F PRES/ABSN URINE INCON ASSESS: CPT | Performed by: NURSE PRACTITIONER

## 2020-03-17 PROCEDURE — G8400 PT W/DXA NO RESULTS DOC: HCPCS | Performed by: NURSE PRACTITIONER

## 2020-03-17 PROCEDURE — G8427 DOCREV CUR MEDS BY ELIG CLIN: HCPCS | Performed by: NURSE PRACTITIONER

## 2020-03-17 PROCEDURE — 82565 ASSAY OF CREATININE: CPT

## 2020-03-17 PROCEDURE — 4004F PT TOBACCO SCREEN RCVD TLK: CPT | Performed by: NURSE PRACTITIONER

## 2020-03-17 PROCEDURE — 6360000004 HC RX CONTRAST MEDICATION: Performed by: NURSE PRACTITIONER

## 2020-03-17 PROCEDURE — 99214 OFFICE O/P EST MOD 30 MIN: CPT | Performed by: NURSE PRACTITIONER

## 2020-03-17 RX ADMIN — GADOBENATE DIMEGLUMINE 12 ML: 529 INJECTION, SOLUTION INTRAVENOUS at 16:36

## 2020-03-17 ASSESSMENT — ENCOUNTER SYMPTOMS
EYES NEGATIVE: 1
GASTROINTESTINAL NEGATIVE: 1
RESPIRATORY NEGATIVE: 1
BACK PAIN: 1

## 2020-03-17 NOTE — PROGRESS NOTES
[facility] Office Visit        Chief Complaint   Patient presents with    Follow-up     Reevaluate left bilateral extremity pain       HISTORY OF PRESENT ILLNESS:      Paul Freeman is a 75 y.o. female who underwent a Right L4-5 microdiscectomy for a herniated disc on 5/24/2017. Prior to surgery she complained of low back pain, right anterior thigh pain, along with numbness that radiated down to the right lower shin area. She was even complaining of the right leg giving out at times. Following surgery she no longer had any right leg pain or numbness. She did very well from surgery. Today Mrs. Izabel Sharma returns to clinic to review new imaging and discuss her left leg pain. The pain started about 3 months ago. She has very little back pain. She states that she has low back pain with left leg pain that radiates into the left buttock, posterior thigh, and to the calf. She denies any numbness. She states that getting up and performing activities such as cleaning her house will exacerbate the pain. She states that only thing that alleviates the pain is lying down on a heating pad. She is currently on gabapentin, and Norco.      She is a smoker. She is not on any anticoagulation. (States she is supposed to take ASA)     3/17/2020: Mrs. Izabel Sharma returns to clinic today to discuss her low back and leg pain. She states that the pain she had on the left side at the last visit is now gone. The pain is now down the right leg that radiates into the right buttock, posterior thigh, and sometimes the plantar and dorsum of the foot. She states that her right foot has been very weak for \"a month or two\". She states \"I can't lift it up\". She has numbness of the right foot as well. She originally started to walk with a solano, she has now gotten \"used\" to the weakness and walks without the solano. She does admit to dragging the right foot.          Past Medical History:   Diagnosis Date    Anxiety  Depression     Estrogen receptor positive     Hypertension     [address]ignant [address] of [address] (female) ([address]     Malignant neoplasm of upper-inner quadrant of right female breast ([address]     Osteoarthritis     Personal history of malignant neoplasm of breast     Thyroid gland disease     Type 2 diabetes mellitus without complication ([address]        Past Surgical History:   Procedure Laterality Date    BREAST LUMPECTOMY      [facility]  12/20/2016    [facility] yr recall    LUMBAR SPINE SURGERY Right 5/24/2017    R L4-5 microdiscectomy wtih additional for lateral approach performed by Josemanuel Conley DO at [address] Right     THYROID SURGERY      TUBAL LIGATION          Medications    Current Outpatient Medications:     levothyroxine (SYNTHROID) 88 MCG tablet, Take 88 mcg by mouth Daily, Disp: , Rfl:     glipiZIDE (GLUCOTROL) 5 MG tablet, Take 5 mg by mouth 2 times daily (before meals), Disp: , Rfl:     letrozole (FEMARA) 2.5 MG tablet, Take 1 tablet by mouth daily, Disp: 90 tablet, Rfl: 3    PARoxetine (PAXIL) 20 MG tablet, Take 20 mg by mouth, Disp: , Rfl:     methylPREDNISolone (MEDROL) 4 MG tablet, Take 4 mg by mouth, Disp: , Rfl:     levofloxacin (LEVAQUIN) 500 MG tablet, Take 500 mg by mouth, Disp: , Rfl:     CALCIUM CITRATE-VITAMIN D PO, Take 1 tablet by mouth, Disp: , Rfl:     HYDROcodone-acetaminophen (NORCO)  MG per tablet, Take 1 tablet by mouth every 6 hours as needed for Pain ., Disp: 60 tablet, Rfl: 0    docusate sodium (COLACE) 100 MG capsule, Take 1 capsule by mouth 2 times daily as needed for Constipation, Disp: 30 capsule, Rfl: 1    metFORMIN (GLUCOPHAGE) 500 MG tablet, Take 500 mg by mouth 2 times daily (with meals), Disp: , Rfl:     amLODIPine (NORVASC) 5 MG tablet, Take 5 mg by mouth daily, Disp: , Rfl:     simvastatin (ZOCOR) 10 MG tablet, Take 10 mg by mouth daily, Disp: , Rfl:     Cyclobenzaprine HCl-Liniment (CYCLOBENZAPRINE COMFORT PAC) 10 MG KIT, by Combination route daily, Disp: , Rfl:     lisinopril (PRINIVIL;ZESTRIL) 20 MG tablet, Take 20 mg by mouth daily, Disp: , Rfl:     temazepam (RESTORIL) 30 MG capsule, Take 30 mg by mouth daily, Disp: , Rfl:     DULoxetine (CYMBALTA) 30 MG extended release capsule, Take 30 mg by mouth daily, Disp: , Rfl:     ALPRAZolam (XANAX) 1 MG tablet, Take 1 mg by mouth 2 times daily, Disp: , Rfl:     calcium carbonate (OSCAL) 500 MG TABS tablet, Take 500 mg by mouth 2 times daily, Disp: , Rfl:   Erythromycin and Penicillins      Social History  Social History     Tobacco Use   Smoking Status Current Every Day Smoker    Packs/day: 0.50    Types: Cigarettes   Smokeless Tobacco Never Used     Social History     Substance and Sexual Activity   Alcohol Use No         Family History   Problem Relation Age of Onset    Arthritis Mother     Cancer Mother     High Blood Pressure Mother     Kidney Disease Mother     Stroke Mother     Vision Loss Mother     Heart Disease Father     Prostate Cancer Father        Review of Systems   Constitutional: Negative. HENT: Negative. Eyes: Negative. Respiratory: Positive for shortness of breath. Negative for cough, hemoptysis, sputum production and wheezing. Cardiovascular: Negative. Gastrointestinal: Negative. Genitourinary: Negative. Musculoskeletal: Negative for back pain, falls, joint pain and neck pain. Skin: Negative. Neurological: Negative. Endo/Heme/Allergies: Negative for environmental allergies and polydipsia. Bruises/bleeds easily. Psychiatric/Behavioral: Positive for depression and hallucinations. Negative for memory loss, substance abuse and suicidal ideas. The patient is nervous/anxious and has insomnia. PHYSICAL EXAM:  Vitals:    03/17/20 1259   BP: 121/60   Pulse: 80     Constitutional: The patient appears well-developed and well-nourished.    Eyes - conjunctiva normal.  Conjugate gaze  Ear, nose, throat -No scars, masses, or lesions over external nose or ears, no atrophy of tongue  Neck-symmetric, no masses noted, no jugular vein distension  Respiration- chest wall appears symmetric, good expansion, normal effort without use of accessory muscles  Musculoskeletal - no significant wasting of muscles noted, no bony deformities, gait no gross ataxia  Extremities-no clubbing, cyanosis or edema  Skin - warm, dry, and intact. No rash, erythema, or pallor. Psychiatric - mood, affect, and behavior appear normal.     Neurologic Examination  Awake, Alert and oriented x 3  Normal speech pattern, following commands  Motor:  RIGHT:    iliopsoas 5/5    knee flexor 5/5    knee extension 5/5    EHL/dorsiflexion 3/5    plantar flexion 5/5    LEFT:      iliopsoas 5/5    knee flexor 5/5    knee extension 5/5    EHL/dorsiflexion 5/5    plantar flexion 5/5    Reflexes absent bilateral achilles   No deficits to light touch   Normal Gait pattern      Wound:  Healed     DATA and IMAGING:    No results found for: WBC, HGB, HCT, MCV, PLTNo results found for: NA, K, CL, CO2, BUN, CREATININE, GLUCOSE, CALCIUM, PROT, LABALBU, BILITOT, ALKPHOS, AST, ALT, LABGLOM, GFRAA, AGRATIO, GLOBNo results found for: INR, PROTIME No results found. MRI Lumbar Spine (12/04/2019) Bob You  I have personally reviewed the images and my interpretation is: There is some DDD throughout   There is evidence of previous right L4-5 laminotomy   L4-5 there is severe right foraminal stenosis   L5-S1 there is moderate right and moderate to severe left   There is some edema noted on STIR imaging at the L5 vertebral body that extends into the pedicle on the left       ASSESSMENT:   Hope Deutsch is a 57 y.o. female who underwent a Right L4-5 microdiscectomy for a herniated disc on 5/24/2017. She now has right sided leg pain. PLAN:  -We have discussed and reviewed the results of the MRI lumbar spine dated 12/04/2019 with Mrs. Gustafson and her daughter at length.   We explained that given her significant progression in right foot weakness we will need to repeat the MRI to see if something has dramatically changed. -Repeat Urgent MRI lumbar spine  -Follow up after imaging           ICD-10-CM    1. Right foot drop M21.371 MRI LUMBAR SPINE W WO CONTRAST     CANCELED: MRI LUMBAR SPINE W WO CONTRAST   2. Right lumbar radiculopathy M54.16 MRI LUMBAR SPINE W WO CONTRAST     CANCELED: MRI LUMBAR SPINE W WO CONTRAST   3. Lumbar foraminal stenosis M48.061 MRI LUMBAR SPINE W WO CONTRAST     CANCELED: MRI LUMBAR SPINE W WO CONTRAST   4.  DDD (degenerative disc disease), lumbar M51.36 MRI LUMBAR SPINE W WO CONTRAST     CANCELED: MRI LUMBAR SPINE W WO CONTRAST   5. DDD (degenerative disc disease), lumbosacral M51.37 MRI LUMBAR SPINE W WO CONTRAST     CANCELED: MRI LUMBAR SPINE W WO CONTRAST        LILY Teixeira

## 2020-03-23 ENCOUNTER — TELEPHONE (OUTPATIENT)
Dept: NEUROSURGERY | Age: 65
End: 2020-03-23

## 2020-03-23 NOTE — TELEPHONE ENCOUNTER
Called and spoke with the patient about Mychart and was able to send her an e-rolf / email to get her set up . [email or web address]

## 2020-03-31 ENCOUNTER — OFFICE VISIT (OUTPATIENT)
Dept: NEUROSURGERY | Age: 65
End: 2020-03-31
Payer: MEDICARE

## 2020-03-31 VITALS
OXYGEN SATURATION: 98 % | SYSTOLIC BLOOD PRESSURE: 140 MMHG | HEIGHT: 59 IN | BODY MASS INDEX: 26 KG/M2 | WEIGHT: 129 LBS | DIASTOLIC BLOOD PRESSURE: 72 MMHG | HEART RATE: 96 BPM

## 2020-03-31 PROCEDURE — G8417 CALC BMI ABV UP PARAM F/U: HCPCS | Performed by: NURSE PRACTITIONER

## 2020-03-31 PROCEDURE — 1090F PRES/ABSN URINE INCON ASSESS: CPT | Performed by: NURSE PRACTITIONER

## 2020-03-31 PROCEDURE — 99214 OFFICE O/P EST MOD 30 MIN: CPT | Performed by: NURSE PRACTITIONER

## 2020-03-31 PROCEDURE — 3017F COLORECTAL CA SCREEN DOC REV: CPT | Performed by: NURSE PRACTITIONER

## 2020-03-31 PROCEDURE — 4040F PNEUMOC VAC/ADMIN/RCVD: CPT | Performed by: NURSE PRACTITIONER

## 2020-03-31 PROCEDURE — 1123F ACP DISCUSS/DSCN MKR DOCD: CPT | Performed by: NURSE PRACTITIONER

## 2020-03-31 PROCEDURE — G8400 PT W/DXA NO RESULTS DOC: HCPCS | Performed by: NURSE PRACTITIONER

## 2020-03-31 PROCEDURE — 4004F PT TOBACCO SCREEN RCVD TLK: CPT | Performed by: NURSE PRACTITIONER

## 2020-03-31 PROCEDURE — G8427 DOCREV CUR MEDS BY ELIG CLIN: HCPCS | Performed by: NURSE PRACTITIONER

## 2020-03-31 PROCEDURE — G8484 FLU IMMUNIZE NO ADMIN: HCPCS | Performed by: NURSE PRACTITIONER

## 2020-03-31 RX ORDER — ALBUTEROL SULFATE 90 UG/1
AEROSOL, METERED RESPIRATORY (INHALATION)
COMMUNITY
Start: 2020-01-30

## 2020-03-31 RX ORDER — ESCITALOPRAM OXALATE 10 MG/1
TABLET ORAL
COMMUNITY
Start: 2020-03-23

## 2020-03-31 RX ORDER — GABAPENTIN 600 MG/1
TABLET ORAL
COMMUNITY
Start: 2020-03-26

## 2020-03-31 RX ORDER — GLIPIZIDE 10 MG/1
TABLET ORAL
COMMUNITY
Start: 2020-03-03

## 2020-03-31 ASSESSMENT — ENCOUNTER SYMPTOMS
BACK PAIN: 1
GASTROINTESTINAL NEGATIVE: 1
EYES NEGATIVE: 1
RESPIRATORY NEGATIVE: 1

## 2020-03-31 NOTE — PROGRESS NOTES
MRI lumbar spine. She continues to have low back and RLE pain that she states is intermittent. She continues to have significant right foot weakness and states that it has gotten somewhat worse, \"I stub my toe more\".         Past Medical History:   Diagnosis Date    Anxiety     Depression     Estrogen receptor positive     Hypertension     [facility]     Malignant neoplasm of upper-inner quadrant of right female breast ([facility]     Osteoarthritis     Personal history of malignant neoplasm of breast     Thyroid gland disease     Type 2 diabetes mellitus without complication ([facility]        Past Surgical History:   Procedure Laterality Date    BREAST LUMPECTOMY      COLONOSCOPY  12/20/2016    [facility], 5 yr recall    LUMBAR SPINE SURGERY Right 5/24/2017    R L4-5 microdiscectomy wtih additional for lateral approach performed by Tiffanie Chang DO at [address] Right     THYROID SURGERY      TUBAL LIGATION          Medications    Current Outpatient Medications:     albuterol sulfate  (90 Base) MCG/ACT inhaler, , Disp: , Rfl:     escitalopram (LEXAPRO) 10 MG tablet, , Disp: , Rfl:     gabapentin (NEURONTIN) 600 MG tablet, , Disp: , Rfl:     glipiZIDE (GLUCOTROL) 10 MG tablet, , Disp: , Rfl:     levothyroxine (SYNTHROID) 88 MCG tablet, Take 88 mcg by mouth Daily, Disp: , Rfl:     letrozole (FEMARA) 2.5 MG tablet, Take 1 tablet by mouth daily, Disp: 90 tablet, Rfl: 3    PARoxetine (PAXIL) 20 MG tablet, Take 20 mg by mouth, Disp: , Rfl:     methylPREDNISolone (MEDROL) 4 MG tablet, Take 4 mg by mouth, Disp: , Rfl:     levofloxacin (LEVAQUIN) 500 MG tablet, Take 500 mg by mouth, Disp: , Rfl:     CALCIUM CITRATE-VITAMIN D PO, Take 1 tablet by mouth, Disp: , Rfl:     HYDROcodone-acetaminophen (NORCO)  MG per tablet, Take 1 tablet by mouth every 6 hours as needed for Pain ., Disp: 60 tablet, Rfl: 0    docusate sodium (COLACE) 100 MG capsule, Take 1 STIR imaging at the L5 vertebral body that extends into the pedicle on the left     Narrative   MRI LUMBAR SPINE W WO CONTRAST 3/17/2020 1:45 PM   HISTORY: M21.371   COMPARISON: None    TECHNIQUE: Multiplanar, multisequence MRI of the lumbar spine was   performed before and after the intravenous infusion of contrast.   FINDINGS:    Alignment: There is minimal anterolisthesis of L5 on S1. No acute   subluxation is identified. Marrow signal: Bilateral pars defects are present at L5. There is   reactive edema in the left lateral mass and pedicle. Cord/Canal: The conus medullaris terminates at the level of L1. The   visualized spinal cord is normal in signal and morphology. There is no   abnormal cord enhancement. Epidural enhancement is present at the   level of L5. This also surrounds the facet joints. Soft tissues: The surrounding soft tissues are unremarkable. Levels:    L1-L2: No disc bulge is present. No significant neuroforaminal or   spinal canal stenosis is seen. L2-L3: Disc desiccation. Anterior osteophytes and mild disc space   loss. No stenosis. Mild facet and ligamentum flavum hypertrophy. L3-L4: Disc desiccation. Mild facet and ligamentum flavum hypertrophy. Mild loss of the disc space. Small disc bulge. No spinal stenosis. L4-L5: Mild loss of the disc space. Disc desiccation. Mild facet   hypertrophy. Mild edema and enhancement surrounding the facet joints   and in the epidural region. Small to moderate disc bulge. Moderate to   severe right foraminal stenosis. L5-S1: Moderate disc bulge. Disc desiccation. Mild loss of the disc   space. Moderate facet hypertrophy and enhancement surrounding the   facet joints. There is also enhancement in the epidural space without   fluid collection. Mild spinal canal stenosis and moderate foraminal   stenosis.        Impression   1. Facet disease involving L4-L5 and L5-S1.  There is also moderate to   severe right-sided foraminal stenosis at L4-L5 and bilateral, moderate   stenosis at L5-S1.   2. Bilateral pars defects at L5. Minimal anterolisthesis of L5 on S1. Signed by Dr Rivas Dixon on 3/17/2020 4:53 PM   I have personally reviewed the images and my interpretation is: There is DDD throughout  There is evidence of previous right sided L4-5 laminectomy   L4-5 severe right foraminal stenosis        ASSESSMENT:   Donna Zamarripa is a 57 y.o. female who underwent a Right L4-5 microdiscectomy for a herniated disc on 5/24/2017. She now has right sided leg pain and right foot drop. PLAN:  -We have discussed and reviewed the results of the MRI lumbar spine with Mrs. Gustafson at length. We explained that she does have severe stenosis on the right at L4-5 that could be the culprit of her foot weakness. Her pain pattern is somewhat off and the surgery she would need is a rather larger surgery. She would need a TLIF of L4-5. We also explained that her foot weakness may not fully recover given that her weakness has been present for maybe 2 months or more. We educated her on surgical versus non-surgical treatments. We also discussed that she needs to make a decision within the next 1-2 weeks regarding surgery. She verbalizes understanding. She would need a TLIF of L4-5 using minimally invasive technique     We discussed risks, complications, and expectations, including but not limited to infection, paralysis, bowel and bladder dysfunction, possible need for revision procedure, persistent pain, spinal fluid leak, stroke and death. In addition, the benefits of the surgery were thoroughly discussed and the patient demonstrated a deep understanding. The patient wishes to proceed with surgical intervention. We will schedule for surgery in the near future. ICD-10-CM    1. Lumbar foraminal stenosis M48.061    2. DDD (degenerative disc disease), lumbar M51.36    3. DDD (degenerative disc disease), lumbosacral M51.37    4.  Right foot drop M21.371

## 2020-03-31 NOTE — PROGRESS NOTES
Review of Systems   Constitutional: Negative. HENT: Negative. Eyes: Negative. Respiratory: Negative. Cardiovascular: Negative. Gastrointestinal: Negative. Genitourinary: Negative. Musculoskeletal: Positive for back pain, joint pain and myalgias. Skin: Negative. Neurological: Positive for tingling. Endo/Heme/Allergies: Negative. Psychiatric/Behavioral: Negative.

## 2020-08-18 ENCOUNTER — OFFICE VISIT (OUTPATIENT)
Dept: HEMATOLOGY | Age: 65
End: 2020-08-18
Payer: MEDICARE

## 2020-08-18 VITALS
HEIGHT: 59 IN | WEIGHT: 135 LBS | OXYGEN SATURATION: 98 % | SYSTOLIC BLOOD PRESSURE: 118 MMHG | TEMPERATURE: 97.5 F | BODY MASS INDEX: 27.21 KG/M2 | HEART RATE: 90 BPM | DIASTOLIC BLOOD PRESSURE: 62 MMHG

## 2020-08-18 PROCEDURE — G8427 DOCREV CUR MEDS BY ELIG CLIN: HCPCS | Performed by: INTERNAL MEDICINE

## 2020-08-18 PROCEDURE — G8400 PT W/DXA NO RESULTS DOC: HCPCS | Performed by: INTERNAL MEDICINE

## 2020-08-18 PROCEDURE — G8417 CALC BMI ABV UP PARAM F/U: HCPCS | Performed by: INTERNAL MEDICINE

## 2020-08-18 PROCEDURE — 4040F PNEUMOC VAC/ADMIN/RCVD: CPT | Performed by: INTERNAL MEDICINE

## 2020-08-18 PROCEDURE — 3017F COLORECTAL CA SCREEN DOC REV: CPT | Performed by: INTERNAL MEDICINE

## 2020-08-18 PROCEDURE — 1123F ACP DISCUSS/DSCN MKR DOCD: CPT | Performed by: INTERNAL MEDICINE

## 2020-08-18 PROCEDURE — 99214 OFFICE O/P EST MOD 30 MIN: CPT | Performed by: INTERNAL MEDICINE

## 2020-08-18 PROCEDURE — 1090F PRES/ABSN URINE INCON ASSESS: CPT | Performed by: INTERNAL MEDICINE

## 2020-08-18 PROCEDURE — 4004F PT TOBACCO SCREEN RCVD TLK: CPT | Performed by: INTERNAL MEDICINE

## 2020-08-18 NOTE — PROGRESS NOTES
Shyann Garza   1955 8/18/2020     Chief Complaint   Patient presents with    Breast Cancer        INTERVAL HISTORY/HISTORY OF PRESENT ILLNESS:  Shyann Garza is a 75 y.o.  female with significant PMH invasive ductal carcinoma of the right breast, ER, [place] and HER-2/liana positive, stage IIIc, August 2015. Esther Acevedo is presently taking adjuvant endocrine therapy with Femara for an anticipated total of 10 years dosing, anticipate completion December 2025. She presents today in routine 4 month follow-up and evaluation of tolerance to Femara. Esther Acevedo reports tolerable hot flashes, arthralgias secondary to the Femara. Unfortunately she continues to smoke. She had any screening mammogram Sep, 2019. She also had cancer markers performed. Otherwise no other complaint. Femara     ONCOLOGIC HISTORY:      Diagnosis  · Invasive ductal carcinoma, August 2015   · ER 92%/OH 90%/HER-2/liana Fish positive, Ki-67 22%, grade 3     · [id number] (stage IIIC)   · BMD Oct 2015- normal     Treatment summary  · 8/20/2015- excisional biopsies positive margins   · Right modified radical mastectomy with axillary dissection   · She received one cycle TCH-P with grade 4 toxicity and declined further adjuvant chemotherapy. · 2/11/2016-she completed adjuvant radiation 6040cGy   · Herceptin 1 year   · Adjuvant endocrine therapy with Femara started December 2015-Dec 2025.     Cancer history  Ms Shyann Garza was first seen by me in on 1/30/2018 referred by Dr. Romario Ospina. · 8/8/2015-screening mammogram at [facility] showed a 3.6 cm mass at 12 o'clock position. · 8/20/2015-excisional biopsy by Dr. Sharan Hodgkin was compatible with invasive ductal carcinoma, grade 3, measuring 4.5 cm with positive surgical margins. ER 92%/OH 90%/HER-2/liana Fish positive, Ki-67 22%   · 9/3/2015 Right modified radical mastectomy with axillary dissection was performed by Dr. Sharan Hodgkin revealing a 7 mm focus of residual high-grade invasive ductal carcinoma. Axillary dissection revealed 17 out of 21 lymph nodes involved by metastatic carcinoma. Closest margin. 4.1 mm. · She was seen by Dr. Vera Ballard who planned 6 cycles of Taxotere/carboplatin/Herceptin and Perjeta. She experienced grade 4 toxicity with acute respiratory failure following cycle #1, and therefore, declined further chemotherapy. · November 2016-she completed 1 year of Herceptin. · 2/11/2016- she completed adjuvant radiation 6040cGy by Dr. Stephanie Dahl completed 2/11/2016   · 1/30/2018-initial oncology consultation with Dr. Anya Hutson. She is currently receiving adjuvant endocrine therapy with Femara since December 2015   · 1/31/2018- DEXA scan documented normal bone mineralization with lumbar spine T score of 0.7 and Z score of 2.3 and left hip T score -0.5 and a Z score of 0.6. · 6/20/2018- CT scan of the head documented no acute intracranial process. .  · December 2018-unremarkable mammogram.     Past Medical History:         Past Medical History:   Diagnosis Date    Anxiety      Cancer ([address]      Depression      Osteoarthritis      Type 2 diabetes mellitus without complication (HCC)              PAST MEDICAL HISTORY:   Past Medical History:   Diagnosis Date    Anxiety     Depression     Estrogen receptor positive     Hypertension     Malignant neoplasm of breast (female) ([address]     Malignant neoplasm of upper-inner quadrant of right female breast ([address]     Osteoarthritis     Personal history of malignant neoplasm of breast     Thyroid gland disease     Type 2 diabetes mellitus without complication ([address]           PAST SURGICAL HISTORY:  Past Surgical History:   Procedure Laterality Date    BREAST LUMPECTOMY      COLONOSCOPY  12/20/2016    [facility], 5 yr recall    LUMBAR SPINE SURGERY Right 5/24/2017    R L4-5 microdiscectomy wtih additional for lateral approach performed by Sherren Settle, DO at [address] Right     THYROID SURGERY      TUBAL LIGATION          SOCIAL  methylPREDNISolone (MEDROL) 4 MG tablet Take 4 mg by mouth      levofloxacin (LEVAQUIN) 500 MG tablet Take 500 mg by mouth      CALCIUM CITRATE-VITAMIN D PO Take 1 tablet by mouth      HYDROcodone-acetaminophen (NORCO)  MG per tablet Take 1 tablet by mouth every 6 hours as needed for Pain . 60 tablet 0    docusate sodium (COLACE) 100 MG capsule Take 1 capsule by mouth 2 times daily as needed for Constipation 30 capsule 1    metFORMIN (GLUCOPHAGE) 500 MG tablet Take 500 mg by mouth 2 times daily (with meals)      amLODIPine (NORVASC) 5 MG tablet Take 5 mg by mouth daily      simvastatin (ZOCOR) 10 MG tablet Take 10 mg by mouth daily      Cyclobenzaprine HCl-Liniment (CYCLOBENZAPRINE COMFORT PAC) 10 MG KIT by Combination route daily      lisinopril (PRINIVIL;ZESTRIL) 20 MG tablet Take 20 mg by mouth daily      temazepam (RESTORIL) 30 MG capsule Take 30 mg by mouth daily      DULoxetine (CYMBALTA) 30 MG extended release capsule Take 30 mg by mouth daily      ALPRAZolam (XANAX) 1 MG tablet Take 1 mg by mouth 2 times daily      calcium carbonate (OSCAL) 500 MG TABS tablet Take 500 mg by mouth 2 times daily       No current facility-administered medications for this visit. REVIEW OF SYSTEMS:    Constitutional: no fever, no night sweats, no fatigue;   HEENT: no blurring of vision, no double vision, no hearing difficulty, no tinnitus,no ulceration, no dysphagia  Lungs: no cough, no shortness of breath, no wheeze;   CVS: no palpitation, no chest pain, no shortness of breath;  GI: no abdominal pain, no nausea , no vomiting, no constipation;   LINDSAY: no dysuria, frequency and urgency, no hematuria, no kidney stones;   Musculoskeletal: Hot flashes, joint pain, swelling , stiffness;   Endocrine: no polyuria, polydypsia, no cold or heat intolerence; Hematology/lymphatic: no easy brusing or bleeding, no hx of clotting disorder; no peripheral adenopathy.   Dermatology: no skin rash, no eczema, no pruritis;   Psychiatry: no depression, no anxiety,no panic attacks, no suicide ideation; Neurology: no syncope, no seizures, no numbness or tingling of hands, no numbness or tingling of feet, no paresis;     PHYSICAL EXAM:    Vitals signs:  /62   Pulse 90   Temp 97.5 °F (36.4 °C)   Ht 4' 11\" (1.499 m)   Wt 135 lb (61.2 kg)   SpO2 98%   BMI 27.27 kg/m²    Pain scale:  Pain Score:   0 - No pain     CONSTITUTIONAL: Alert, appropriate, no acute distress,   EYES: Non icteric, EOM intact, pupils equal round and reactive to light and accommodation. ENT: Oral mucus membranes moist, no oral pharyngeal lesions. External inspection of ears and nose are normal.   NECK: Supple, no masses. No palpable thyroid mass    CHEST/LUNGS: CTA bilaterally, normal respiratory effort   CARDIOVASCULAR: RRR, no murmurs. No lower extremity edema   ABDOMEN: soft non-tender, active bowel sounds, no hepatosplenomegaly. No palpable masses. EXTREMITIES: warm, Full ROM of all fours extremities. No focal weakness. SKIN: warm, dry with no rashes or lesions  LYMPH: No cervical, clavicular, axillary, or inguinal lymphadenopathy  NEUROLOGIC: follows commands, non focal.   PSYCH: mood and affect appropriate. Alert and oriented to time and place and person. Relevant Lab findings/reviewed by me:  Tumor markers within normal limits. 7/14/2020  CBC within normal limits  CA-15-3 = 21.8   CA-27-29 = 34  CMP = creatinine 0.8, LFTs within the normal limits. Relevant Imaging studies/reviewed by me:  Mammogram December 2018-unremarkable mammogram category 1.      ASSESSMENT    Orders Placed This Encounter   Procedures    ENIO DIGITAL DIAGNOSTIC UNILATERAL LEFT     Standing Status:   Future     Standing Expiration Date:   10/18/2021     Order Specific Question:   Does this patient have implants? Answer:   No     Order Specific Question:   Does this patient have a personal history of breast cancer?      Answer:   Yes     Order Specific quitting smoking. Specifically, we discussed the risks related to tobacco including but not limited to risk of several types of cancer, cardiovascular disease, stroke, bad dentition, financial loss and so on. I advised the patient to quit and offered the resources such as nicotine patches or medications to help with the craving. We will follow-up on this during the next visit and continue to encourage on quitting this habit.     FOLLOW UP:  1. Follow-up appointment given for 6 months  2. Repeat cancer markers at follow-up  3. Repeat CT lung cancer screening February 2021  4. Follow-up with other medical providers as recommended  5. Bone mineral density  6. Screening mammogram       Follow Up:     Return in about 6 months (around 2/18/2021). Data Tobias Martini, satinder scribing for George Walden MD. Electronically signed by Caity Martini on 8/18/2020 at 4:56 PM.     I, Dr Jeannie Michaels, personally performed the services described in this documentation as scribed by Caity Martini MA in my presence and is both accurate and complete. Over 50% of the total visit time of 25 minutes in face to face encounter with the patient, out of which more than 50% of the time was spent in counseling patient or family and coordination of care. Counseling included but was not limited to time spent reviewing labs, imaging studies/ treatment plan and answering questions.

## 2020-08-19 RX ORDER — LETROZOLE 2.5 MG/1
2.5 TABLET, FILM COATED ORAL DAILY
Qty: 90 TABLET | Refills: 3 | Status: SHIPPED | OUTPATIENT
Start: 2020-08-19 | End: 2020-11-24 | Stop reason: SDUPTHER

## 2020-10-05 ENCOUNTER — OFFICE VISIT (OUTPATIENT)
Dept: RADIATION ONCOLOGY | Facility: HOSPITAL | Age: 65
End: 2020-10-05

## 2020-10-05 ENCOUNTER — HOSPITAL ENCOUNTER (OUTPATIENT)
Dept: RADIATION ONCOLOGY | Facility: HOSPITAL | Age: 65
Setting detail: RADIATION/ONCOLOGY SERIES
End: 2020-10-05

## 2020-10-05 VITALS
SYSTOLIC BLOOD PRESSURE: 137 MMHG | HEART RATE: 86 BPM | WEIGHT: 135 LBS | BODY MASS INDEX: 26.5 KG/M2 | HEIGHT: 60 IN | DIASTOLIC BLOOD PRESSURE: 63 MMHG

## 2020-10-05 DIAGNOSIS — C50.211 MALIGNANT NEOPLASM OF UPPER-INNER QUADRANT OF RIGHT BREAST IN FEMALE, ESTROGEN RECEPTOR POSITIVE (HCC): Primary | ICD-10-CM

## 2020-10-05 DIAGNOSIS — Z90.11 S/P RIGHT MASTECTOMY: ICD-10-CM

## 2020-10-05 DIAGNOSIS — C77.9 REGIONAL LYMPH NODE METASTASIS PRESENT (HCC): ICD-10-CM

## 2020-10-05 DIAGNOSIS — Z79.811 PROPHYLACTIC USE OF LETROZOLE (FEMARA): ICD-10-CM

## 2020-10-05 DIAGNOSIS — Z17.0 MALIGNANT NEOPLASM OF UPPER-INNER QUADRANT OF RIGHT BREAST IN FEMALE, ESTROGEN RECEPTOR POSITIVE (HCC): Primary | ICD-10-CM

## 2020-10-05 DIAGNOSIS — Z92.3 HX OF RADIATION THERAPY: ICD-10-CM

## 2020-10-05 DIAGNOSIS — F17.200 CURRENT EVERY DAY SMOKER: ICD-10-CM

## 2020-10-05 PROBLEM — M25.50 AROMATASE INHIBITOR-ASSOCIATED ARTHRALGIA: Status: ACTIVE | Noted: 2019-09-16

## 2020-10-05 PROBLEM — T45.1X5A AROMATASE INHIBITOR-ASSOCIATED ARTHRALGIA: Status: ACTIVE | Noted: 2019-09-16

## 2020-10-05 PROCEDURE — G0463 HOSPITAL OUTPT CLINIC VISIT: HCPCS | Performed by: RADIOLOGY

## 2020-10-05 RX ORDER — DULOXETIN HYDROCHLORIDE 60 MG/1
1 CAPSULE, DELAYED RELEASE ORAL DAILY
COMMUNITY
Start: 2020-09-23

## 2020-10-05 RX ORDER — LISINOPRIL 20 MG/1
1 TABLET ORAL DAILY
COMMUNITY
Start: 2020-10-02

## 2020-11-24 RX ORDER — LETROZOLE 2.5 MG/1
2.5 TABLET, FILM COATED ORAL DAILY
Qty: 90 TABLET | Refills: 3 | Status: SHIPPED | OUTPATIENT
Start: 2020-11-24 | End: 2021-04-13 | Stop reason: SDUPTHER

## 2021-04-12 NOTE — PROGRESS NOTES
a 7 mm focus of residual high-grade invasive ductal carcinoma. Axillary dissection revealed 17 out of 21 lymph nodes involved by metastatic carcinoma. Closest margin. 4.1 mm. · She was seen by Dr. Anna Marie Coughlin who planned 6 cycles of Taxotere/carboplatin/Herceptin and Perjeta. She experienced grade 4 toxicity with acute respiratory failure following cycle #1, and therefore, declined further chemotherapy. · November 2016she completed 1 year of Herceptin. · 2/11/2016- she completed adjuvant radiation 6040cGy by Dr. Nupur Lewis completed 2/11/2016   · 1/30/2018initial oncology consultation with Dr. Abhilash Cruz. She is currently receiving adjuvant endocrine therapy with Femara since December 2015   · 1/31/2018- DEXA scan documented normal bone mineralization with lumbar spine T score of 0.7 and Z score of 2.3 and left hip T score -0.5 and a Z score of 0.6. · 6/20/2018- CT scan of the head documented no acute intracranial process. .  · December 2018unremarkable mammogram.  · 8/25/20 Bone density: Normal bone mineralization. LS spine T-score 0.6. Left hip T-score -0.8.   · 9/29/20 Mammogram: Negative mammogram     Past Medical History:         Past Medical History:   Diagnosis Date    Anxiety      Cancer ([address]      Depression      Osteoarthritis      Type 2 diabetes mellitus without complication (HCC)              PAST MEDICAL HISTORY:   Past Medical History:   Diagnosis Date    Anxiety     Depression     Estrogen receptor positive     Hypertension     Malignant neoplasm of breast (female) ([address]     Malignant neoplasm of upper-inner quadrant of right female breast ([address]     Osteoarthritis     Personal history of malignant neoplasm of breast     Thyroid gland disease     Type 2 diabetes mellitus without complication ([address]           PAST SURGICAL HISTORY:  Past Surgical History:   Procedure Laterality Date    BREAST LUMPECTOMY      COLONOSCOPY  12/20/2016    [facility], 5 yr recall    LUMBAR SPINE SURGERY Right 5/24/2017    R L4-5 microdiscectomy wtih additional for lateral approach performed by Drea Rock DO at [address] Right     THYROID SURGERY      TUBAL LIGATION          SOCIAL HISTORY:  Social History     Socioeconomic History    Marital status:       Spouse name: None    Number of children: None    Years of education: None    Highest education level: None   Occupational History    None   Social Needs    Financial resource strain: None    Food insecurity     Worry: None     Inability: None    Transportation needs     Medical: None     Non-medical: None   Tobacco Use    Smoking status: Current Every Day Smoker     Packs/day: 0.50     Types: Cigarettes    Smokeless tobacco: Never Used   Substance and Sexual Activity    Alcohol use: No    Drug use: No    Sexual activity: None   Lifestyle    Physical activity     Days per week: None     Minutes per session: None    Stress: None   Relationships    Social connections     Talks on phone: None     Gets together: None     Attends Sabianist service: None     Active member of club or organization: None     Attends meetings of clubs or organizations: None     Relationship status: None    Intimate partner violence     Fear of current or ex partner: None     Emotionally abused: None     Physically abused: None     Forced sexual activity: None   Other Topics Concern    None   Social History Narrative    None       FAMILY HISTORY:  Family History   Problem Relation Age of Onset    Arthritis Mother     Cancer Mother     High Blood Pressure Mother     Kidney Disease Mother     Stroke Mother     Vision Loss Mother     Heart Disease Father     Prostate Cancer Father         Current Outpatient Medications   Medication Sig Dispense Refill    letrozole (FEMARA) 2.5 MG tablet Take 1 tablet by mouth daily 90 tablet 3    albuterol sulfate  (90 Base) MCG/ACT inhaler       escitalopram (LEXAPRO) 10 MG tablet       gabapentin (NEURONTIN) 600 MG tablet       glipiZIDE (GLUCOTROL) 10 MG tablet       levothyroxine (SYNTHROID) 88 MCG tablet Take 88 mcg by mouth Daily      CALCIUM CITRATE-VITAMIN D PO Take 1 tablet by mouth      HYDROcodone-acetaminophen (NORCO)  MG per tablet Take 1 tablet by mouth every 6 hours as needed for Pain . 60 tablet 0    docusate sodium (COLACE) 100 MG capsule Take 1 capsule by mouth 2 times daily as needed for Constipation 30 capsule 1    metFORMIN (GLUCOPHAGE) 500 MG tablet Take 500 mg by mouth 2 times daily (with meals)      amLODIPine (NORVASC) 5 MG tablet Take 5 mg by mouth daily      simvastatin (ZOCOR) 10 MG tablet Take 10 mg by mouth daily      Cyclobenzaprine HCl-Liniment (CYCLOBENZAPRINE COMFORT PAC) 10 MG KIT by Combination route daily      lisinopril (PRINIVIL;ZESTRIL) 20 MG tablet Take 20 mg by mouth daily      temazepam (RESTORIL) 30 MG capsule Take 30 mg by mouth daily      DULoxetine (CYMBALTA) 30 MG extended release capsule Take 30 mg by mouth daily      ALPRAZolam (XANAX) 1 MG tablet Take 1 mg by mouth 2 times daily      calcium carbonate (OSCAL) 500 MG TABS tablet Take 500 mg by mouth 2 times daily      PARoxetine (PAXIL) 20 MG tablet Take 20 mg by mouth      methylPREDNISolone (MEDROL) 4 MG tablet Take 4 mg by mouth      levofloxacin (LEVAQUIN) 500 MG tablet Take 500 mg by mouth       No current facility-administered medications for this visit.          REVIEW OF SYSTEMS:    Constitutional: no fever, no night sweats, no fatigue;   HEENT: no blurring of vision, no double vision, no hearing difficulty, no tinnitus,no ulceration, no dysphagia  Lungs: no cough, no shortness of breath, no wheeze;   CVS: no palpitation, no chest pain, no shortness of breath;  GI: no abdominal pain, no nausea , no vomiting, no constipation;   LINDSAY: no dysuria, frequency and urgency, no hematuria, no kidney stones;   Musculoskeletal: Hot flashes, joint pain, swelling , stiffness;   Endocrine: no polyuria, polydypsia, no cold or heat intolerence; Hematology/lymphatic: no easy brusing or bleeding, no hx of clotting disorder; no peripheral adenopathy. Dermatology: no skin rash, no eczema, no pruritis;   Psychiatry: no depression, no anxiety,no panic attacks, no suicide ideation; Neurology: no syncope, no seizures, no numbness or tingling of hands, no numbness or tingling of feet, no paresis;     PHYSICAL EXAM:    Vitals signs:  BP (!) 118/52   Pulse 117   Temp 98.1 °F (36.7 °C)   Ht 4' 11\" (1.499 m)   Wt 133 lb (60.3 kg)   SpO2 93%   BMI 26.86 kg/m²    Pain scale:  Pain Score:   0 - No pain     CONSTITUTIONAL: Alert, appropriate, no acute distress,   EYES: Non icteric, EOM intact, pupils equal round and reactive to light and accommodation. ENT: Oral mucus membranes moist, no oral pharyngeal lesions. External inspection of ears and nose are normal.   NECK: Supple, no masses. No palpable thyroid mass    CHEST/LUNGS: CTA bilaterally, normal respiratory effort   Chaperoned breast exam with Kristin James RN. Left breast exam was unremarkable. Right mastectomy scar well-healed. No evidence of skin nodules. CARDIOVASCULAR: Tachycardic , no murmurs. No lower extremity edema   ABDOMEN: soft non-tender, active bowel sounds, no hepatosplenomegaly. No palpable masses. EXTREMITIES: warm, Full ROM of all fours extremities. No focal weakness. SKIN: warm, dry with no rashes or lesions  LYMPH: No cervical, clavicular, axillary, or inguinal lymphadenopathy  NEUROLOGIC: follows commands, non focal.   PSYCH: mood and affect appropriate. Alert and oriented to time and place and person. Relevant Lab findings/reviewed by me:  3/12/21   CA 27-29 38.1  CA 15-3 23.5  CEA 4.4    CBC 3/12/21  WBC 10.6  HGB 14.6  HCT 42.9    ANC 6.17      Relevant Imaging studies/reviewed by me:  8/25/20 Bone density: Normal bone mineralization. LS spine T-score 0.6.  Left hip T-score -0.8.    9/29/20 Mammogram: Negative mammogram      ASSESSMENT    Orders Placed This Encounter   Procedures    Low Dose Chest CT -Abnormal Lung Screen Follow up     Age: 77 y.o. Smoking History:   Social History    Tobacco Use      Smoking status: Current Every Day Smoker        Packs/day: 0.50        Types: Cigarettes      Smokeless tobacco: Never Used    Alcohol use: No    Drug use: No    Pack years: 0  Last CT lung screen: No previous lung cancer screening exam     Standing Status:   Future     Standing Expiration Date:   4/13/2022     Scheduling Instructions:      Sched 1st available     Order Specific Question:   Reason for exam:     Answer:   yearly lung cancer screening    ENIO DIGITAL DIAGNOSTIC UNILATERAL LEFT     Standing Status:   Future     Standing Expiration Date:   10/13/2022     Scheduling Instructions:      sched after 9/30/21 at [address] [place] [address] Specific Question:   Reason for exam:     Answer:   yearly mammogram      Kyle Gonzalez was seen today for breast cancer. Diagnoses and all orders for this visit:    History of breast cancer  -     Low Dose Chest CT -Abnormal Lung Screen Follow up; Future  -     ENIO DIGITAL DIAGNOSTIC UNILATERAL LEFT; Future  -     letrozole (FEMARA) 2.5 MG tablet; Take 1 tablet by mouth daily    Tobacco abuse  -     Low Dose Chest CT -Abnormal Lung Screen Follow up; Future    At risk for cancer  -     Low Dose Chest CT -Abnormal Lung Screen Follow up; Future    Encounter for screening for lung cancer  -     Low Dose Chest CT -Abnormal Lung Screen Follow up; Future    Encounter for screening mammogram for malignant neoplasm of breast   -     ENIO DIGITAL DIAGNOSTIC UNILATERAL LEFT; Future  -     letrozole (FEMARA) 2.5 MG tablet; Take 1 tablet by mouth daily    Aromatase inhibitor use    Encounter for monitoring aromatase inhibitor therapy    Healthcare maintenance  Comments:  Discussed about screening mammogram and CT low-dose lung cancer screening.        IDC (R. breast) stage IIIc with 17 of 21 lymph nodes positive, ER, [place] and HER-2/liana positive. Only received 1 cycle of TCH due to toxicity. Completed adjuvant radiation therapy. Continues with adjuvant endocrine therapy with Femara. Anticipated total of 10 year dosing, December 2025.  - CMP, CBC and tumor markers prior to 6 month follow-up  - continue Femara     Aromatase inhibitor therapy-associated arthralgia. Presently tolerable and declines need for intervention at this time. Mild arthralgia and mild hot flash. Both tolerable. Tobacco abuse  We talked about the importance of quitting smoking. She is ready yet to quit. Health Maintenance: The patient is encouraged to follow-up with primary care regularly for further recommendations regarding age appropriated screening for cancer, well-being visit (preventative  measures), follow-up and treatment of other medical comorbidities. Last CT low-dose lung cancer screening February 2020. Repeat CT low-dose lung cancer screening now. Repeat mammogram August 2021     FOLLOW UP:  1. RTC with MD 6 months in [place]  2. Repeat cancer markers at follow-up  3. CT chest low dose lung cancer screening- 1st available appointment  4. Follow-up with other medical providers as recommended  5. Screening mammogram in August  6. Repeat Bone density Sept 2022  7. Continue Femara       Follow Up:     Return in about 6 months (around 10/13/2021) for Appointment with Dr. Leola Cannon in [place]. Mammogram in August  CT chest low dose 1st available       IMouna, am scribing for Cher Peña MD. Electronically signed by Mouna Sin RN on 4/13/2021 at 5:08 PM CDT. I, Dr Consuelo Stark, personally performed the services described in this documentation as scribed by Mouna Sin RN in my presence and is both accurate and complete. I have seen, examined and reviewed this patient medication list, appropriate labs and imaging studies.  I reviewed relevant medical records and others physicians notes. I discussed the plans of care with the patient. I answered all the questions to the patients satisfaction. I have also reviewed the chief complaint (CC) and part of the history (History of Present Illness (HPI), Past Family Social History [facility]), or Review of Systems (ROS) and made changes when appropriated.        (Please note that portions of this note were completed with a voice recognition program. Efforts were made to edit the dictations but occasionally words are mis-transcribed.) no

## 2021-04-13 ENCOUNTER — OFFICE VISIT (OUTPATIENT)
Dept: HEMATOLOGY | Age: 66
End: 2021-04-13
Payer: MEDICARE

## 2021-04-13 VITALS
BODY MASS INDEX: 26.81 KG/M2 | TEMPERATURE: 98.1 F | DIASTOLIC BLOOD PRESSURE: 52 MMHG | OXYGEN SATURATION: 93 % | WEIGHT: 133 LBS | SYSTOLIC BLOOD PRESSURE: 118 MMHG | HEIGHT: 59 IN | HEART RATE: 117 BPM

## 2021-04-13 DIAGNOSIS — Z00.00 HEALTHCARE MAINTENANCE: ICD-10-CM

## 2021-04-13 DIAGNOSIS — Z79.811 AROMATASE INHIBITOR USE: ICD-10-CM

## 2021-04-13 DIAGNOSIS — Z12.2 ENCOUNTER FOR SCREENING FOR LUNG CANCER: ICD-10-CM

## 2021-04-13 DIAGNOSIS — Z91.89 AT RISK FOR CANCER: ICD-10-CM

## 2021-04-13 DIAGNOSIS — Z79.811 ENCOUNTER FOR MONITORING AROMATASE INHIBITOR THERAPY: ICD-10-CM

## 2021-04-13 DIAGNOSIS — Z72.0 TOBACCO ABUSE: ICD-10-CM

## 2021-04-13 DIAGNOSIS — Z51.81 ENCOUNTER FOR MONITORING AROMATASE INHIBITOR THERAPY: ICD-10-CM

## 2021-04-13 DIAGNOSIS — Z85.3 HISTORY OF BREAST CANCER: Primary | ICD-10-CM

## 2021-04-13 DIAGNOSIS — Z12.31 ENCOUNTER FOR SCREENING MAMMOGRAM FOR MALIGNANT NEOPLASM OF BREAST: ICD-10-CM

## 2021-04-13 PROCEDURE — G8427 DOCREV CUR MEDS BY ELIG CLIN: HCPCS | Performed by: INTERNAL MEDICINE

## 2021-04-13 PROCEDURE — 3017F COLORECTAL CA SCREEN DOC REV: CPT | Performed by: INTERNAL MEDICINE

## 2021-04-13 PROCEDURE — G8400 PT W/DXA NO RESULTS DOC: HCPCS | Performed by: INTERNAL MEDICINE

## 2021-04-13 PROCEDURE — G8417 CALC BMI ABV UP PARAM F/U: HCPCS | Performed by: INTERNAL MEDICINE

## 2021-04-13 PROCEDURE — 1090F PRES/ABSN URINE INCON ASSESS: CPT | Performed by: INTERNAL MEDICINE

## 2021-04-13 PROCEDURE — 99214 OFFICE O/P EST MOD 30 MIN: CPT | Performed by: INTERNAL MEDICINE

## 2021-04-13 PROCEDURE — 4040F PNEUMOC VAC/ADMIN/RCVD: CPT | Performed by: INTERNAL MEDICINE

## 2021-04-13 PROCEDURE — 4004F PT TOBACCO SCREEN RCVD TLK: CPT | Performed by: INTERNAL MEDICINE

## 2021-04-13 PROCEDURE — 1123F ACP DISCUSS/DSCN MKR DOCD: CPT | Performed by: INTERNAL MEDICINE

## 2021-04-13 RX ORDER — LETROZOLE 2.5 MG/1
2.5 TABLET, FILM COATED ORAL DAILY
Qty: 90 TABLET | Refills: 3 | Status: SHIPPED | OUTPATIENT
Start: 2021-04-13

## 2021-09-13 ENCOUNTER — APPOINTMENT (OUTPATIENT)
Dept: WOMENS IMAGING | Age: 66
End: 2021-09-13
Payer: MEDICARE

## 2021-09-13 ENCOUNTER — HOSPITAL ENCOUNTER (OUTPATIENT)
Dept: CT IMAGING | Age: 66
Discharge: HOME | End: 2021-09-13
Payer: MEDICARE

## 2021-09-13 DIAGNOSIS — Z91.89 AT RISK FOR CANCER: ICD-10-CM

## 2021-09-13 DIAGNOSIS — Z12.2 ENCOUNTER FOR SCREENING FOR LUNG CANCER: ICD-10-CM

## 2021-09-13 DIAGNOSIS — Z72.0 TOBACCO ABUSE: ICD-10-CM

## 2021-09-13 DIAGNOSIS — Z85.3 HISTORY OF BREAST CANCER: ICD-10-CM

## 2021-09-13 PROCEDURE — 71271 CT THORAX LUNG CANCER SCR C-: CPT

## 2021-09-23 ENCOUNTER — TELEPHONE (OUTPATIENT)
Dept: INFUSION THERAPY | Age: 66
End: 2021-09-23

## 2021-09-23 NOTE — TELEPHONE ENCOUNTER
Patient called needing labs and mammogram set up for next week at [facility], I have left a message with mammogram scheduling and when scheduled I will fax the order and mail orders to patient per her request.        Carloz Barth.  Asim Yung

## 2021-10-22 NOTE — PROGRESS NOTES
MEDICAL ONCOLOGY PROGRESS NOTE      [facility]   1955  10/26/2021     Chief Complaint   Patient presents with    Follow-up     History of breast cancer        INTERVAL HISTORY/HISTORY OF PRESENT ILLNESS:  Alejandra Wilkerson is a 74 y.o.  female with significant PMH invasive ductal carcinoma of the right breast, ER, NM and HER-2/liana positive, stage IIIc, August 2015. [place] Vitale is presently taking adjuvant endocrine therapy with Femara for an anticipated total of 10 years dosing, anticipate completion December 2025. She presents today for her scheduled follow-up visit. She had canceled month, CMP and CBC performed. She has been tolerating Femara well. She denies any major complaints except for mild hot flashes and arthralgias which are tolerable. Unfortunately, she continues to smoke. She had left breast mammogram and a CT low-dose  of the chest performed for lung cancer screening. She denies any new breast complaints.     ONCOLOGIC HISTORY:      Diagnosis  · Invasive ductal carcinoma, August 2015   · ER 92%/NM 90%/HER-2/liana Fish positive, Ki-67 22%, grade 3     · [id number] (stage IIIC)   · BMD Oct 2015- normal     Treatment summary  · 8/20/2015- excisional biopsies positive margins   · Right modified radical mastectomy with axillary dissection   · She received one cycle TCH-P with grade 4 toxicity and declined further adjuvant chemotherapy. · 2/11/2016she completed adjuvant radiation 6040cGy   · Herceptin 1 year   · Adjuvant endocrine therapy with Femara started December 2015-Dec 2025.     Cancer history  Ms Alejandra Wilkerson was first seen by me in on 1/30/2018 referred by Dr. Janes Grewal. · 8/8/2015screening mammogram at [facility] showed a 3.6 cm mass at 12 o'clock position. · 8/20/2015excisional biopsy by Dr. Jennifer Marinelli was compatible with invasive ductal carcinoma, grade 3, measuring 4.5 cm with positive surgical margins.  ER 92%/NM 90%/HER-2/liana Fish positive, Ki-67 22%   · 9/3/2015 Right modified radical mastectomy with axillary dissection was performed by Dr. Logan Kayser revealing a 7 mm focus of residual high-grade invasive ductal carcinoma. Axillary dissection revealed 17 out of 21 lymph nodes involved by metastatic carcinoma. Closest margin. 4.1 mm. · She was seen by Dr. Afia Pina who planned 6 cycles of Taxotere/carboplatin/Herceptin and Perjeta. She experienced grade 4 toxicity with acute respiratory failure following cycle #1, and therefore, declined further chemotherapy. · November 2016she completed 1 year of Herceptin. · 2/11/2016- she completed adjuvant radiation 6040cGy by Dr. Gudelia Zuniga completed 2/11/2016   · 1/30/2018initial oncology consultation with Dr. Nataliya Rios. She is currently receiving adjuvant endocrine therapy with Femara since December 2015   · 1/31/2018- DEXA scan documented normal bone mineralization with lumbar spine T score of 0.7 and Z score of 2.3 and left hip T score -0.5 and a Z score of 0.6. · 6/20/2018- CT scan of the head documented no acute intracranial process. .  · December 2018unremarkable mammogram.  · 8/25/20 Bone density: Normal bone mineralization. LS spine T-score 0.6. Left hip T-score -0.8. · 9/13/2021 CT Lung Screening A stable low-dose screening CT scan of the chest. No features of malignancy. A follow-up low-dose screening CT scan of the chest in one year may be obtained. Chronic interstitial lung disease. Lung rads 2. ACR Lung-RADS Assessment Categories: Category 0 - Incomplete - additional lung cancer screening CT images and/ or comparison to prior chest CT examinations is needed. Category 1 - Negative - continue annual screening with LDCT in 12 months. Category 2 - Benign Appearance or Behavior -  · 10/06/2021 Mammogram Right Breast ([facility]) No mammographic findings evidence of malignancy. Stable exam. Status post right mastectomy.  BI-RADS 1: negative      Past Medical History:         Past Medical History:   Diagnosis Date    Anxiety      Breast Cancer ([facility] [address] 2015      Depression      Osteoarthritis      Type 2 diabetes mellitus without complication (HCC)              PAST MEDICAL HISTORY:   Past Medical History:   Diagnosis Date    Anxiety     Depression     Estrogen receptor positive     Hypertension     [address]ignant neoplasm of breast (female) ([address]     Malignant neoplasm of upper-inner quadrant of right female breast ([address]     Osteoarthritis     Personal history of malignant neoplasm of breast     Thyroid gland disease     Type 2 diabetes mellitus without complication ([facility] [address]           PAST SURGICAL HISTORY:  Past Surgical History:   Procedure Laterality Date    BREAST LUMPECTOMY      COLONOSCOPY  12/20/2016    [facility], 5 yr recall    LUMBAR SPINE SURGERY Right 5/24/2017    R L4-5 microdiscectomy wtih additional for lateral approach performed by Deven Easton DO at [address] Right     THYROID SURGERY      TUBAL LIGATION          SOCIAL HISTORY:  Social History     Socioeconomic History    Marital status:      Spouse name: None    Number of children: None    Years of education: None    Highest education level: None   Occupational History    None   Tobacco Use    Smoking status: Current Every Day Smoker     Packs/day: 0.50     Types: Cigarettes    Smokeless tobacco: Never Used   Substance and Sexual Activity    Alcohol use: No    Drug use: No    Sexual activity: None   Other Topics Concern    None   Social History Narrative    None     Social Determinants of Health     Financial Resource Strain:     Difficulty of Paying Living Expenses:    Food Insecurity:     Worried About Running Out of Food in the Last Year:     Ran Out of Food in the Last Year:    Transportation Needs:     Lack of Transportation (Medical):      Lack of Transportation (Non-Medical):    Physical Activity:     Days of Exercise per Week:     Minutes of Exercise per Session:    Stress:     Feeling of Stress :    Social Connections:  Frequency of Communication with Friends and Family:     Frequency of Social Gatherings with Friends and Family:     Attends [employer] Services:     Active Member of Clubs or Organizations:     Attends Club or Organization Meetings:     Marital Status:    Intimate Partner Violence:     Fear of Current or Ex-Partner:     Emotionally Abused:     Physically Abused:     Sexually Abused:        FAMILY HISTORY:  Family History   Problem Relation Age of Onset    Arthritis Mother     Cancer Mother     High Blood Pressure Mother     Kidney Disease Mother     Stroke Mother     Vision Loss Mother     Heart Disease Father     Prostate Cancer Father         Current Outpatient Medications   Medication Sig Dispense Refill    letrozole ([address] [facility]) 2.5 MG tablet Take 1 tablet by mouth daily 90 tablet 3    albuterol sulfate  (90 Base) MCG/ACT inhaler       escitalopram (LEXAPRO) 10 MG tablet       gabapentin (NEURONTIN) 600 MG tablet       glipiZIDE (GLUCOTROL) 10 MG tablet       levothyroxine (SYNTHROID) 88 MCG tablet Take 88 mcg by mouth Daily      PARoxetine (PAXIL) 20 MG tablet Take 20 mg by mouth      methylPREDNISolone (MEDROL) 4 MG tablet Take 4 mg by mouth      levofloxacin (LEVAQUIN) 500 MG tablet Take 500 mg by mouth      CALCIUM CITRATE-VITAMIN D PO Take 1 tablet by mouth      HYDROcodone-acetaminophen (NORCO)  MG per tablet Take 1 tablet by mouth every 6 hours as needed for Pain .  60 tablet 0    docusate sodium (COLACE) 100 MG capsule Take 1 capsule by mouth 2 times daily as needed for Constipation 30 capsule 1    metFORMIN (GLUCOPHAGE) 500 MG tablet Take 500 mg by mouth 2 times daily (with meals)      amLODIPine (NORVASC) 5 MG tablet Take 5 mg by mouth daily      simvastatin (ZOCOR) 10 MG tablet Take 10 mg by mouth daily      Cyclobenzaprine HCl-Liniment (CYCLOBENZAPRINE COMFORT PAC) 10 MG KIT by Combination route daily      lisinopril (PRINIVIL;ZESTRIL) 20 MG tablet Take 20 lower extremity edema  ABDOMEN: soft non-tender, active bowel sounds, no HSM. No palpable masses  EXTREMITIES: warm, full ROM in all 4 extremities, no focal weakness. SKIN: warm, dry with no rashes or lesions  LYMPH: No cervical, clavicular, axillary, or inguinal lymphadenopathy  NEUROLOGIC: follows commands, non focal   PSYCH: mood and affect appropriate. Alert and oriented to time, place, person     Relevant Lab findings/reviewed by me:  Cancer markers- 2021normal CA 15-3, CA 27-29 and CEA        Relevant Imaging studies/reviewed by me:  9/13/2021 CT  Low dose Lung Screening A stable low-dose screening CT scan of the chest. No features of malignancy. A follow-up low-dose screening CT scan of the chest in one year may be obtained. Chronic interstitial lung disease. Lung rads 2. ACR Lung-RADS Assessment Categories: Category 0 - Incomplete - additional lung cancer screening CT images and/ or comparison to prior chest CT examinations is needed. Category 1 - Negative - continue annual screening with LDCT in 12 months. Category 2 - Benign Appearance or Behavior - continue annual screening with LDCT in 12 months. Category 3 - Probably Benign - 6 month LDCT. Category 4A - Suspicious - 3 month LDCT;. PET/CT may be used when  there is a > 8mm solid component. Category 4B - Suspicious - Chest CT with or without contrast, PET/CT and/ or tissue sampling depending on the probability of malignancy and  comorbidities. PET/CT may be used when there is a > 8mm solid component. Category S - Significant - other. Category C - Prior Lung Cancer. .    10/06/2021- Mammogram Right Breast ([facility]) No mammographic findings evidence of malignancy. Stable exam. Status post right mastectomy. BI-RADS 1: negative     ASSESSMENT:    No orders of the defined types were placed in this encounter. Diane Chris was seen today for follow-up.     Diagnoses and all orders for this visit:    History of breast cancer    Care plan discussed with patient       IDC (R. breast) stage IIIc with 17 of 21 lymph nodes positive, ER, [place] and HER-2/liana positive. Only received 1 cycle of TCH due to toxicity. Completed adjuvant radiation therapy. Continues with adjuvant endocrine therapy with Femara. Anticipated total of 10 year dosing, December 2025.  - CMP, CBC and tumor markers prior to 6 month follow-up  - continue Femara     Aromatase inhibitor therapy-associated arthralgia. Presently tolerable and declines need for intervention at this time. Mild arthralgia and mild hot flash. Both tolerable. Health Maintenance: The patient is encouraged to follow-up with primary care regularly for further recommendations regarding age appropriated screening for cancer, well-being visit (preventative  measures), follow-up and treatment of other medical comorbidities. Last CT low-dose lung cancer screening Sep 2021  Repeat CT low-dose lung cancer screening Sep 2022  Oct 2022- Left MMG-cat 2    At risk of malignancycontinue CT low-dose lung cancer screening/mammogram yearly. Addendum: 998 2021CT low-dose lung cancer screening protocol showed stable low-dose screening CT of the chest.  No features of malignancy. Follow-up 1 year.     FOLLOW UP:  · RTC with MD 6 months in [place]  · Repeat cancer markers at follow-up  · CT chest low dose lung cancer screening- Sep 2022  · Follow-up with other medical providers as recommended  · Screening mammogram in August 2022  · Repeat Bone density Sept 2022  · Continue Femara  · Repeat CT low-dose lung cancer screening mid September 2022       Follow Up:     No follow-ups on file. Data Rosario Tate am scribing for Kelsi Carreno MD. Electronically signed by Rick Rivera RN on 10/26/2021 at 3:23 PM CDT. I, Dr Monico Sotelo, personally performed the services described in this documentation as scribed by Rick Rivera RN in my presence and is both accurate and complete.     I have seen, examined and reviewed this patient medication list, appropriate labs and imaging studies. I reviewed relevant medical records and others physicians notes. I discussed the plans of care with the patient. I answered all the questions to the patients satisfaction. I have also reviewed the chief complaint (CC) and part of the history (History of Present Illness (HPI), Past Family Social History [facility]), or Review of Systems (ROS) and made changes when appropriated.        (Please note that portions of this note were completed with a voice recognition program. Efforts were made to edit the dictations but occasionally words are mis-transcribed.)

## 2021-10-26 ENCOUNTER — OFFICE VISIT (OUTPATIENT)
Dept: HEMATOLOGY | Age: 66
End: 2021-10-26
Payer: MEDICARE

## 2021-10-26 VITALS
SYSTOLIC BLOOD PRESSURE: 102 MMHG | OXYGEN SATURATION: 95 % | HEIGHT: 59 IN | DIASTOLIC BLOOD PRESSURE: 60 MMHG | HEART RATE: 85 BPM | BODY MASS INDEX: 25.4 KG/M2 | WEIGHT: 126 LBS | TEMPERATURE: 97.5 F

## 2021-10-26 DIAGNOSIS — Z71.89 CARE PLAN DISCUSSED WITH PATIENT: ICD-10-CM

## 2021-10-26 DIAGNOSIS — Z85.3 HISTORY OF BREAST CANCER: Primary | ICD-10-CM

## 2021-10-26 DIAGNOSIS — Z79.811 ENCOUNTER FOR MONITORING AROMATASE INHIBITOR THERAPY: ICD-10-CM

## 2021-10-26 DIAGNOSIS — Z00.00 HEALTHCARE MAINTENANCE: ICD-10-CM

## 2021-10-26 DIAGNOSIS — C50.911 INVASIVE DUCTAL CARCINOMA OF BREAST, FEMALE, RIGHT (HCC): ICD-10-CM

## 2021-10-26 DIAGNOSIS — Z51.81 ENCOUNTER FOR MONITORING AROMATASE INHIBITOR THERAPY: ICD-10-CM

## 2021-10-26 PROCEDURE — 3017F COLORECTAL CA SCREEN DOC REV: CPT | Performed by: INTERNAL MEDICINE

## 2021-10-26 PROCEDURE — 99214 OFFICE O/P EST MOD 30 MIN: CPT | Performed by: INTERNAL MEDICINE

## 2021-10-26 PROCEDURE — 4040F PNEUMOC VAC/ADMIN/RCVD: CPT | Performed by: INTERNAL MEDICINE

## 2021-10-26 PROCEDURE — 1090F PRES/ABSN URINE INCON ASSESS: CPT | Performed by: INTERNAL MEDICINE

## 2021-10-26 PROCEDURE — 4004F PT TOBACCO SCREEN RCVD TLK: CPT | Performed by: INTERNAL MEDICINE

## 2021-10-26 PROCEDURE — G8417 CALC BMI ABV UP PARAM F/U: HCPCS | Performed by: INTERNAL MEDICINE

## 2021-10-26 PROCEDURE — G8400 PT W/DXA NO RESULTS DOC: HCPCS | Performed by: INTERNAL MEDICINE

## 2021-10-26 PROCEDURE — 1123F ACP DISCUSS/DSCN MKR DOCD: CPT | Performed by: INTERNAL MEDICINE

## 2021-10-26 PROCEDURE — G8427 DOCREV CUR MEDS BY ELIG CLIN: HCPCS | Performed by: INTERNAL MEDICINE

## 2021-10-26 PROCEDURE — G8484 FLU IMMUNIZE NO ADMIN: HCPCS | Performed by: INTERNAL MEDICINE

## 2022-06-20 NOTE — PROGRESS NOTES
Jayy Dafkeeley   1955 6/21/2022     Chief Complaint   Patient presents with    Follow-up     History of breast cancer        INTERVAL HISTORY/HISTORY OF PRESENT ILLNESS:  Della Sullivan is a 69 y.o.  female with significant PMH invasive ductal carcinoma of the right breast, ER, CT and HER-2/liana positive, stage IIIc, August 2015. Eloisa Adler is presently taking adjuvant endocrine therapy with Femara for an anticipated total of 10 years dosing, anticipate completion December 2025. She presents today in routine 6 month follow-up and evaluation of tolerance to Femara. Eloisa Adler reports tolerating treatment with complaints of mild hot flashes and stable arthralgias secondary to the Femara. Unfortunately she continues to smoke. She has been started on bupropion. She had a screening mammogram Sep, 2021 that was category 1. She denies any new complaints.       ONCOLOGIC HISTORY:      Diagnosis  · Invasive ductal carcinoma, August 2015   · ER 92%/CT 90%/HER-2/liana Fish positive, Ki-67 22%, grade 3     · yE2R2D4 (stage IIIC)   · BMD Oct 2015- normal     Treatment summary  · 8/20/2015- excisional biopsies positive margins   · Right modified radical mastectomy with axillary dissection   · She received one cycle TCH-P with grade 4 toxicity and declined further adjuvant chemotherapy. · 2/11/2016she completed adjuvant radiation 6040cGy   · Herceptin 1 year   · Adjuvant endocrine therapy with Femara started December 2015-Dec 2025.     Cancer history  Ms Della Sullivan was first seen by me in on 1/30/2018 referred by Dr. Gato Bean. · 8/8/2015screening mammogram at [facility] showed a 3.6 cm mass at 12 o'clock position. · 8/20/2015excisional biopsy by Dr. Tiffanie Sawyer was compatible with invasive ductal carcinoma, grade 3, measuring 4.5 cm with positive surgical margins.  ER 92%/CT 90%/HER-2/liana Fish positive, Ki-67 22%   · 9/3/2015 Right modified radical mastectomy with axillary dissection was performed by  Whitney Schaffer revealing a 7 mm focus of residual high-grade invasive ductal carcinoma. Axillary dissection revealed 17 out of 21 lymph nodes involved by metastatic carcinoma. Closest margin. 4.1 mm. · She was seen by Dr. Adia Arroyo who planned 6 cycles of Taxotere/carboplatin/Herceptin and Perjeta. She experienced grade 4 toxicity with acute respiratory failure following cycle #1, and therefore, declined further chemotherapy. · November 2016shyee completed 1 year of Herceptin. · 2/11/2016- she completed adjuvant radiation 6040cGy by Dr. Greg De Leon completed 2/11/2016   · 1/30/2018initial oncology consultation with Dr. Ani Whitaker. She is currently receiving adjuvant endocrine therapy with Femara since December 2015   · 1/31/2018- DEXA scan documented normal bone mineralization with lumbar spine T score of 0.7 and Z score of 2.3 and left hip T score -0.5 and a Z score of 0.6. · 6/20/2018- CT scan of the head documented no acute intracranial process. .  · December 2018unremarkable mammogram.  · 8/25/20 Bone density: Normal bone mineralization. LS spine T-score 0.6. Left hip T-score -0.8. · 9/29/20 Mammogram: Negative mammogram  · 3/12/2021 CEA 4.4, CA 15-3- 23.5, CA 27.29  · 9/13/2021 CT Lung Screening  A stable low-dose screening CT scan of the chest. No features of malignancy. A follow-up low-dose screening CT scan of thechest in one year may be obtained. · 10/1/2021 Mammogram([facility]) No mammographic evidence of malignancy.  Stable exam. BI-RADS Category 1- Negative     Past Medical History:         Past Medical History:   Diagnosis Date    Anxiety      Cancer ([address]      Depression      Osteoarthritis      Type 2 diabetes mellitus without complication (HCC)              PAST MEDICAL HISTORY:   Past Medical History:   Diagnosis Date    Anxiety     Depression     Estrogen receptor positive     Hypertension     Malignant neoplasm of breast (female) ([address]     Malignant ne[facility] of upper-inner quadrant of right [facility] breast [facility]     Osteoarthritis     Personal history of malignant neoplasm of breast     Thyroid gland disease     Type 2 diabetes mellitus without complication ([facility]           PAST SURGICAL HISTORY:  Past Surgical History:   Procedure Laterality Date    BREAST LUMPECTOMY      COLONOSCOPY  12/20/2016    [facility], 5 yr recall    LUMBAR SPINE SURGERY Right 5/24/2017    R L4-5 microdiscectomy wtih additional for lateral approach performed by Mary Gopi, DO at [address] Right     THYROID SURGERY      TUBAL LIGATION          SOCIAL HISTORY:  Social History     Socioeconomic History    Marital status:      Spouse name: None    Number of children: None    Years of education: None    Highest education level: None   Occupational History    None   Tobacco Use    Smoking status: Current Every Day Smoker     Packs/day: 0.50     Types: Cigarettes    Smokeless tobacco: Never Used   Substance and Sexual Activity    Alcohol use: No    Drug use: No    Sexual activity: None   Other Topics Concern    None   Social History Narrative    None     Social Determinants of Health     Financial Resource Strain:     Difficulty of Paying Living Expenses: Not on file   Food Insecurity:     Worried About Running Out of Food in the Last Year: Not on file    Saida of Food in the Last Year: Not on file   Transportation Needs:     Lack of Transportation (Medical): Not on file    Lack of Transportation (Non-Medical):  Not on file   Physical Activity:     Days of Exercise per Week: Not on file    Minutes of Exercise per Session: Not on file   Stress:     Feeling of Stress : Not on file   Social Connections:     Frequency of Communication with Friends and Family: Not on file    Frequency of Social Gatherings with Friends and Family: Not on file    Attends [employer] Services: Not on file    Active Member of Clubs or Organizations: Not on file    Attends Club or Organization Meetings: Not on file  Marital Status: Not on file   Intimate Partner Violence:     Fear of Current or Ex-Partner: Not on file    Emotionally Abused: Not on file    Physically Abused: Not on file    Sexually Abused: Not on file   Housing Stability:     Unable to Pay for Housing in the Last Year: Not on file    Number of Places Lived in the Last Year: Not on file    Unstable Housing in the Last Year: Not on file       FAMILY HISTORY:  Family History   Problem Relation Age of Onset    Arthritis Mother     Cancer Mother     High Blood Pressure Mother     Kidney Disease Mother     Stroke Mother     Vision Loss Mother     Heart Disease Father     Prostate Cancer Father         Current Outpatient Medications   Medication Sig Dispense Refill    letrozole ([address] [facility]) 2.5 MG tablet Take 1 tablet by mouth daily 90 tablet 3    albuterol sulfate  (90 Base) MCG/ACT inhaler       escitalopram (LEXAPRO) 10 MG tablet       gabapentin (NEURONTIN) 600 MG tablet       glipiZIDE (GLUCOTROL) 10 MG tablet       levothyroxine (SYNTHROID) 88 MCG tablet Take 88 mcg by mouth Daily      PARoxetine (PAXIL) 20 MG tablet Take 20 mg by mouth      methylPREDNISolone (MEDROL) 4 MG tablet Take 4 mg by mouth      levofloxacin (LEVAQUIN) 500 MG tablet Take 500 mg by mouth      CALCIUM CITRATE-VITAMIN D PO Take 1 tablet by mouth      HYDROcodone-acetaminophen (NORCO)  MG per tablet Take 1 tablet by mouth every 6 hours as needed for Pain .  60 tablet 0    docusate sodium (COLACE) 100 MG capsule Take 1 capsule by mouth 2 times daily as needed for Constipation 30 capsule 1    metFORMIN (GLUCOPHAGE) 500 MG tablet Take 500 mg by mouth 2 times daily (with meals)      amLODIPine (NORVASC) 5 MG tablet Take 5 mg by mouth daily      simvastatin (ZOCOR) 10 MG tablet Take 10 mg by mouth daily      Cyclobenzaprine HCl-Liniment (CYCLOBENZAPRINE COMFORT PAC) 10 MG KIT by Combination route daily      lisinopril (PRINIVIL;ZESTRIL) 20 MG tablet Take 20 mg by mouth daily      temazepam (RESTORIL) 30 MG capsule Take 30 mg by mouth daily      DULoxetine (CYMBALTA) 30 MG extended release capsule Take 30 mg by mouth daily      ALPRAZolam (XANAX) 1 MG tablet Take 1 mg by mouth 2 times daily      calcium carbonate (OSCAL) 500 MG TABS tablet Take 500 mg by mouth 2 times daily       No current facility-administered medications for this visit. REVIEW OF SYSTEMS:   CONSTITUTIONAL: no fever, no night sweats, fatigue;  HEENT: no blurring of vision, no double vision, no hearing difficulty, no tinnitus, no ulceration, no dysplasia, no epistaxis;   LUNGS: no cough, no hemoptysis, no wheeze,  no shortness of breath;  CARDIOVASCULAR: no palpitation, no chest pain, no shortness of breath;  GI: no abdominal pain, no nausea, no vomiting, no diarrhea, no constipation;  LINDSAY: no dysuria, no hematuria, no frequency or urgency, no nephrolithiasis;  MUSCULOSKELETAL:back pain, no joint pain, no swelling, no stiffness;  ENDOCRINE: no polyuria, no polydipsia, no cold or heat intolerance;  HEMATOLOGY: no easy bruising or bleeding, no history of clotting disorder;  DERMATOLOGY: no skin rash, no eczema, no pruritus;  PSYCHIATRY: no depression, no anxiety, no panic attacks, no suicidal ideation, no homicidal ideation;  NEUROLOGY: no syncope, no seizures, no numbness or tingling of hands, numbness or tingling of feet, no paresis;    Vitals signs:  BP (!) 112/58   Pulse 78   Ht 4' 11\" (1.499 m)   Wt 128 lb (58.1 kg)   SpO2 97%   BMI 25.85 kg/m²    Pain scale:  Pain Score:   0 - No pain      PHYSICAL EXAM:  CONSTITUTIONAL: Alert, appropriate, no acute distress  EYES: Non icteric, EOM intact, pupils equal round   ENT: Mucus membranes moist, no oral pharyngeal lesions, external inspection of ears and nose are normal.  NECK: Supple, no masses. No palpable thyroid mass  Breastchaperone Ric Handsome [place]). Right breast mastectomy site well-healed.   Left breast exam was unremarkable. No palpable nodule. No skin change. No regional palpable adenopathy. CHEST/LUNGS: CTA bilaterally, normal respiratory effort   CARDIOVASCULAR: RRR, no murmurs. No lower extremity edema  ABDOMEN: soft non-tender, active bowel sounds, no HSM. No palpable masses  EXTREMITIES: warm, full ROM in all 4 extremities, no focal weakness. SKIN: warm, dry with no rashes or lesions  LYMPH: No cervical, clavicular, axillary, or inguinal lymphadenopathy  NEUROLOGIC: follows commands, non focal   PSYCH: mood and affect appropriate. Alert and oriented to time, place, person        Relevant Lab findings/reviewed by me:  3/12/2021 CEA 4.4, CA 15-3- 23.5, CA 27.29      Relevant Imaging studies/reviewed by me:  9/13/2021 CT Lung Screening  A stable low-dose screening CT scan of the chest. No features of malignancy. A follow-up low-dose screening CT scan of thechest in one year may be obtained. Chronic interstitial lung disease. Lung rads. ACR Lung-RADS Assessment Categories:Category 0 - Incomplete - additional lung cancer screening CT images and/ or comparison to prior chest CT examinations is needed. Category 1 - Negative - continue annual screening with LDCT in 12 months. Category 2 - Benign Appearance or Behavior - continue annual screening with LDCT in 12 months. Category 3 - Probably Benign - 6 month LDCT. Category 4A - Suspicious - 3 month LDCT;. PET/CT may be used when there is a > 8mm solid component. Category 4B - Suspicious - Chest CT with or without contrast, PET/CT and/ or tissue sampling depending on the probability of malignancy and comorbidities. PET/CT may be used when there is a > 8mm solid component. Category S - Significant - other. Category C - Prior Lung Cancer. .    10/1/2021 Mammogram([facility]) No mammographic evidence of malignancy. Stable exam. BI-RADS Category 1- Negative      ASSESSMENT    Orders Placed This Encounter   Procedures    CT lung screen [Initial/Annual]     Age: 69 y.o.    Smoking History: Social History    Tobacco Use      Smoking status: Current Every Day Smoker        Packs/day: 0.50        Types: Cigarettes      Smokeless tobacco: Never Used    Alcohol use: No    Drug use: No    Pack years: 0  Last CT lung screen: 9/13/2021     Standing Status:   Future     Standing Expiration Date:   6/21/2023     Scheduling Instructions:      sched around 10/3/22 at [facility]     Order Specific Question:   Is there documentation of shared decision making? Answer:   Yes     Order Specific Question:   Does the patient show any signs or symptoms of lung cancer? Answer:   No     Order Specific Question:   Is this the first (baseline) CT or an annual exam?     Answer: Annual [2]     Order Specific Question:   Is this a low dose CT or a routine CT? Answer:   Low Dose CT [1]     Order Specific Question:   Smoking Status? Answer:   Current Every Day Smoker [1]     Order Specific Question:   Smoking packs per day? Answer:   1     Order Specific Question:   Years smoking? Answer:   48    ENIO DIGITAL SCREEN W OR WO CAD BILATERAL     Standing Status:   Future     Standing Expiration Date:   6/21/2023     Scheduling Instructions:      sched at [facility] after 10/1/22     Order Specific Question:   Reason for exam:     Answer:   yearly screening mammogram      Amos Ibanez was seen today for follow-up. Diagnoses and all orders for this visit:    Tobacco abuse  -     CT lung screen [Initial/Annual]; Future    Encounter for screening for lung cancer  -     CT lung screen [Initial/Annual]; Future    History of smoking greater than 50 pack years  -     CT lung screen [Initial/Annual]; Future    Encounter for screening mammogram for malignant neoplasm of breast  -     ENIO DIGITAL SCREEN W OR WO CAD BILATERAL; Future    Invasive ductal carcinoma of breast, female, right ([facility] [address]  -     ENIO DIGITAL SCREEN W OR WO CAD BILATERAL; Future    History of breast cancer  -     ENIO DIGITAL SCREEN W OR WO CAD BILATERAL;  Future IDC (R. breast) stage IIIc with 17 of 21 lymph nodes positive, ER, MD and HER-2/liana positive. Only received 1 cycle of TCH due to toxicity. Completed adjuvant radiation therapy. Continues with adjuvant endocrine therapy with Femara. Anticipated total of 10 year dosing, December 2025.  - CMP, CBC and tumor markers prior to 6 month follow-up  - continue Femara  -Breast exam was unremarkable today 06/21/22. Left breast mastectomy site well-healed     Aromatase inhibitor therapy-associated arthralgia. Presently tolerable and declines need for intervention at this time. Mild arthralgia and mild hot flash. Both tolerable. Tobacco abuse  We talked about the importance of quitting smoking. She is ready yet to quit. Health Maintenance: The patient is encouraged to follow-up with primary care regularly for further recommendations regarding age appropriated screening for cancer, well-being visit (preventative  measures), follow-up and treatment of other medical comorbidities. Last CT low-dose lung cancer screening February 2020. Repeat CT low-dose lung cancer Oct 2022  Repeat mammogram Oct 2022    FOLLOW UP:  · RTC with MD in [place] in Oct after scans  · CT chest low dose lung cancer screening in Oct 2022  · Screening mammogram in Oct 2022  · Repeat Bone density Oct 2022  · Continue Letrozole 2.5mg daily  · Follow-up with other medical providers as recommended       Follow Up:     Return in about 16 weeks (around 10/11/2022) for Appointment with Dr. Alton Bill in [place]. [place] mammogram [facility] after 10/3/22  CT chest low dose around 10/3/22 at [facility]       Alvarez MORALES am pre charting  as Medical Assistant for Angela Mcgraw MD. Electronically signed by Alvarez Lara MA on 6/21/2022 at 5:26 PM CDT. Chasity Carlos am scribing for Angela Mcgraw MD. Electronically signed by Gina Abarca RN on 6/21/2022 at 4:14 PM CDT.     Dr Raymond MORALES, personally performed the services described in this documentation as scribed by Kiet Pryor RN in my presence and is both accurate and complete. I have seen, examined and reviewed this patient medication list, appropriate labs and imaging studies. I reviewed relevant medical records and others physicians notes. I discussed the plans of care with the patient. I answered all the questions to the patients satisfaction. I have also reviewed the chief complaint (CC) and part of the history (History of Present Illness (HPI), Past Family Social History [facility]), or Review of Systems (ROS) and made changes when appropriated. (Please note that portions of this note were completed with a voice recognition program. Efforts were made to edit the dictations but occasionally words are mis-transcribed. )Electronically signed by Andrew Calderón MD on 6/21/2022 at 4:31 PM

## 2022-06-21 ENCOUNTER — OFFICE VISIT (OUTPATIENT)
Dept: HEMATOLOGY | Age: 67
End: 2022-06-21
Payer: MEDICARE

## 2022-06-21 VITALS
BODY MASS INDEX: 25.8 KG/M2 | SYSTOLIC BLOOD PRESSURE: 112 MMHG | DIASTOLIC BLOOD PRESSURE: 58 MMHG | OXYGEN SATURATION: 97 % | HEART RATE: 78 BPM | WEIGHT: 128 LBS | HEIGHT: 59 IN

## 2022-06-21 DIAGNOSIS — C50.911 INVASIVE DUCTAL CARCINOMA OF BREAST, FEMALE, RIGHT (HCC): ICD-10-CM

## 2022-06-21 DIAGNOSIS — Z87.891 HISTORY OF SMOKING GREATER THAN 50 PACK YEARS: ICD-10-CM

## 2022-06-21 DIAGNOSIS — Z85.3 HISTORY OF BREAST CANCER: ICD-10-CM

## 2022-06-21 DIAGNOSIS — Z12.2 ENCOUNTER FOR SCREENING FOR LUNG CANCER: ICD-10-CM

## 2022-06-21 DIAGNOSIS — Z72.0 TOBACCO ABUSE: Primary | ICD-10-CM

## 2022-06-21 DIAGNOSIS — Z12.31 ENCOUNTER FOR SCREENING MAMMOGRAM FOR MALIGNANT NEOPLASM OF BREAST: ICD-10-CM

## 2022-06-21 PROCEDURE — G8417 CALC BMI ABV UP PARAM F/U: HCPCS | Performed by: INTERNAL MEDICINE

## 2022-06-21 PROCEDURE — 1123F ACP DISCUSS/DSCN MKR DOCD: CPT | Performed by: INTERNAL MEDICINE

## 2022-06-21 PROCEDURE — 99214 OFFICE O/P EST MOD 30 MIN: CPT | Performed by: INTERNAL MEDICINE

## 2022-06-21 PROCEDURE — G8400 PT W/DXA NO RESULTS DOC: HCPCS | Performed by: INTERNAL MEDICINE

## 2022-06-21 PROCEDURE — G8427 DOCREV CUR MEDS BY ELIG CLIN: HCPCS | Performed by: INTERNAL MEDICINE

## 2022-06-21 PROCEDURE — 3017F COLORECTAL CA SCREEN DOC REV: CPT | Performed by: INTERNAL MEDICINE

## 2022-06-21 PROCEDURE — 1090F PRES/ABSN URINE INCON ASSESS: CPT | Performed by: INTERNAL MEDICINE

## 2022-06-21 PROCEDURE — 4004F PT TOBACCO SCREEN RCVD TLK: CPT | Performed by: INTERNAL MEDICINE

## 2022-11-07 NOTE — PROGRESS NOTES
MEDICAL ONCOLOGY PROGRESS NOTE      Baljinder Prasad [facility]   1955 11/8/2022     Chief Complaint   Patient presents with    Follow-up     History of breast cancer          INTERVAL HISTORY/HISTORY OF PRESENT ILLNESS:  Silvino Talbert is a 69 y.o.  female with significant PMH invasive ductal carcinoma of the right breast, ER, GA and HER-2/liana positive, stage IIIc, August 2015. Cheo Lawson is presently taking adjuvant endocrine therapy with Femara for an anticipated total of 10 years dosing, anticipate completion December 2025. She presents today in routine 6 month follow-up and evaluation of tolerance to Femara. Cheo Lawson reports tolerating treatment with complaints of mild hot flashes and stable arthralgias secondary to the Femara. Unfortunately she continues to smoke. She has been started on bupropion. She had a screening mammogram Sep, 2021 that was category 1. She denies any new complaints. ONCOLOGIC HISTORY:      Diagnosis  Invasive ductal carcinoma, August 2015   ER 92%/GA 90%/HER-2/liana Fish positive, Ki-67 22%, grade 3     [id number] (stage IIIC)   BMD Oct 2015- normal     Treatment summary  8/20/2015- excisional biopsies positive margins   Right modified radical mastectomy with axillary dissection   She received one cycle TCH-P with grade 4 toxicity and declined further adjuvant chemotherapy   2/11/2016-she completed adjuvant radiation 6040cGy   Herceptin 1 year   Adjuvant endocrine therapy with Femara started December 2015-Dec 2025     Cancer history  Ms Silvino Talbert was first seen by me in on 1/30/2018 referred by Dr. Jerilyn Engle. 8/8/2015-screening mammogram at [facility] showed a 3.6 cm mass at 12 o'clock position. 8/20/2015-excisional biopsy by Dr. Lew Berger was compatible with invasive ductal carcinoma, grade 3, measuring 4.5 cm with positive surgical margins.  ER 92%/GA 90%/HER-2/liana Fish positive, Ki-67 22%   9/3/2015 Right modified radical mastectomy with axillary dissection was performed by Dr. Lew Berger revealing a 7 mm focus of residual high-grade invasive ductal carcinoma. Axillary dissection revealed 17 out of 21 lymph nodes involved by metastatic carcinoma. Closest margin. 4.1 mm. She was seen by Dr. Dorene Jacobs who planned 6 cycles of Taxotere/carboplatin/Herceptin and Perjeta. She experienced grade 4 toxicity with acute respiratory failure following cycle #1, and therefore, declined further chemotherapy. November 2016-she completed 1 year of Herceptin. 2/11/2016- she completed adjuvant radiation 6040cGy by Dr. Lolis Vazquez completed 2/11/2016 1/30/2018-initial oncology consultation with Dr. Aaron Avila. She is currently receiving adjuvant endocrine therapy with Femara since December 2015 1/31/2018- DEXA scan documented normal bone mineralization with lumbar spine T score of 0.7 and Z score of 2.3 and left hip T score -0.5 and a Z score of 0.6.   6/20/2018- CT scan of the head documented no acute intracranial process. .  December 2018-unremarkable mammogram.  8/25/20 Bone density: Normal bone mineralization. LS spine T-score 0.6. Left hip T-score -0.8.  9/29/20 Mammogram: Negative mammogram  3/12/2021 CEA 4.4, CA 15-3- 23.5, CA 27.29  9/13/2021 CT Lung Screening  A stable low-dose screening CT scan of the chest. No features of malignancy. [facility] 10/1/2021 Mammogram([facility]) No mammographic evidence of malignancy. Stable exam. BI-RADS Category 1- Negative  8/20/2022 Tumor Markers([facility]): CEA 4, CA 15-3-22.1, CA 27.29-30  9/26/2022 CT Low Dose Lung Screening ([facility]) Central lobular emphysematous disease with no evidence of malignancy. No evidence of groundglass opacities or consolidative infiltrates. Remote right mastectomy. 10/3/2022 Mammogram ([facility])  No mammographic evidence of malignancy.  Stable exam.     Past Medical History:         Past Medical History:   Diagnosis Date    Anxiety      Cancer ([facility] [address] Depression      Osteoarthritis      Type 2 diabetes mellitus without complication ([address]            PAST MEDICAL HISTORY:   Past Medical History:   Diagnosis Date    Anxiety     Depression     Estrogen receptor positive     Hypertension     Malignant neoplasm of breast (female) ([address]     Malignant neoplasm of upper-inner quadrant of right female breast ([address]     Osteoarthritis     Personal history of malignant neoplasm of breast     Thyroid gland disease     Type 2 diabetes mellitus without complication ([address]           PAST SURGICAL HISTORY:  Past Surgical History:   Procedure Laterality Date    BREAST LUMPECTOMY      COLONOSCOPY  12/20/2016    [facility], 5 yr recall    LUMBAR SPINE SURGERY Right 5/24/2017    R L4-5 microdiscectomy wtih additional for lateral approach [facility]     THYROID SURGERY      TUBAL LIGATION          SOCIAL HISTORY:  Social History     Socioeconomic History    Marital status:       Spouse name: None    Number of children: None    Years of education: None    Highest education level: None   Tobacco Use    Smoking status: Every Day     Packs/day: 0.50     Types: Cigarettes    Smokeless tobacco: Never   Substance and Sexual Activity    Alcohol use: No    Drug use: No       FAMILY HISTORY:  Family History   Problem Relation Age of Onset    Arthritis Mother     Cancer Mother     High Blood Pressure Mother     Kidney Disease Mother     Stroke Mother     Vision Loss Mother     Heart Disease Father     Prostate Cancer Father         Current Outpatient Medications   Medication Sig Dispense Refill    letrozole (FEMARA) 2.5 MG tablet Take 1 tablet by mouth daily 90 tablet 3    albuterol sulfate  (90 Base) MCG/ACT inhaler       escitalopram (LEXAPRO) 10 MG tablet       gabapentin (NEURONTIN) 600 MG tablet       glipiZIDE (GLUCOTROL) 10 MG tablet       levothyroxine (SYNTHROID) 88 MCG tablet Take 88 mcg by mouth Daily      PARoxetine (PAXIL) 20 MG tablet Take 20 mg by mouth      methylPREDNISolone (MEDROL) 4 MG tablet Take 4 mg by mouth      levofloxacin (LEVAQUIN) 500 MG tablet Take 500 mg by mouth      CALCIUM CITRATE-VITAMIN D PO Take 1 tablet by mouth      HYDROcodone-acetaminophen (NORCO)  MG per tablet Take 1 tablet by mouth every 6 hours as needed for Pain . 60 tablet 0    docusate sodium (COLACE) 100 MG capsule Take 1 capsule by mouth 2 times daily as needed for Constipation 30 capsule 1    metFORMIN (GLUCOPHAGE) 500 MG tablet Take 500 mg by mouth 2 times daily (with meals)      amLODIPine (NORVASC) 5 MG tablet Take 5 mg by mouth daily      simvastatin (ZOCOR) 10 MG tablet Take 10 mg by mouth daily      Cyclobenzaprine HCl-Liniment (CYCLOBENZAPRINE COMFORT PAC) 10 MG KIT by Combination route daily      lisinopril (PRINIVIL;ZESTRIL) 20 MG tablet Take 20 mg by mouth daily      temazepam (RESTORIL) 30 MG capsule Take 30 mg by mouth daily      DULoxetine (CYMBALTA) 30 MG extended release capsule Take 30 mg by mouth daily      ALPRAZolam (XANAX) 1 MG tablet Take 1 mg by mouth 2 times daily      calcium carbonate (OSCAL) 500 MG TABS tablet Take 500 mg by mouth 2 times daily       No current facility-administered medications for this visit.         REVIEW OF SYSTEMS:   CONSTITUTIONAL: no fever, no night sweats, no fatigue;  HEENT: no blurring of vision, no double vision, no hearing difficulty, no tinnitus, no ulceration, no dysplasia, no epistaxis;   LUNGS: no cough, no hemoptysis, no wheeze,  no shortness of breath;  CARDIOVASCULAR: no palpitation, no chest pain, no shortness of breath;  GI: no abdominal pain, no nausea, no vomiting, no diarrhea, no constipation;  LINDSAY: no dysuria, no hematuria, no frequency or urgency, no nephrolithiasis;  MUSCULOSKELETAL: no joint pain, no swelling, no stiffness;  ENDOCRINE: no polyuria, no polydipsia, no cold or heat intolerance;  HEMATOLOGY: no easy bruising or bleeding, no history of clotting disorder;  DERMATOLOGY: no skin rash, no eczema, no pruritus;  PSYCHIATRY: no depression, no anxiety, no panic attacks, no suicidal ideation, no homicidal ideation;  NEUROLOGY: no syncope, no seizures, no numbness or tingling of hands, no numbness or tingling of feet, no paresis;    Vitals signs:  /62   Pulse 90   Ht 4' 11\" (1.499 m)   Wt 129 lb (58.5 kg)   SpO2 97%   BMI 26.05 kg/m²    Pain scale:  Pain Score:   0 - No pain    PHYSICAL EXAM:  CONSTITUTIONAL: Alert, appropriate, no acute distress  EYES: Non icteric, EOM intact, pupils equal round   ENT: Mucus membranes moist, no oral pharyngeal lesions, external inspection of ears and nose are normal.  NECK: Supple, no masses. No palpable thyroid mass  Breast-chaperone Keri Willingham). Right breast mastectomy site well-healed. Left breast exam was unremarkable. No palpable nodule. No skin change. No regional palpable adenopathy. CHEST/LUNGS: CTA bilaterally, normal respiratory effort   CARDIOVASCULAR: RRR, no murmurs. No lower extremity edema  ABDOMEN: soft non-tender, active bowel sounds, no HSM. No palpable masses  EXTREMITIES: warm, full ROM in all 4 extremities, no focal weakness. SKIN: warm, dry with no rashes or lesions  LYMPH: No cervical, clavicular, axillary, or inguinal lymphadenopathy  NEUROLOGIC: follows commands, non focal   PSYCH: mood and affect appropriate. Alert and oriented to time, place, person        Relevant Lab findings/reviewed by me:  8/20/2022 Tumor Markers([facility]): CEA 4, CA 15-3-22.1, CA 27.29-30      Relevant Imaging studies/reviewed by me:  9/26/2022 CT Low Dose Lung Screening ([facility]) Central lobular emphysematous disease with no evidence of malignancy. No evidence of groundglass opacities or consolidative infiltrates. Remote right mastectomy. 10/3/2022 Mammogram ([facility])  No mammographic evidence of malignancy.  Stable exam.      ASSESSMENT    Orders Placed This Encounter   Procedures    DEXA BONE DENSITY 2 SITES Standing Status:   Future     Standing Expiration Date:   5/8/2024     Scheduling Instructions:      Sched prior to next MD visit     Order Specific Question:   Reason for exam:     Answer:   screening for osteopenia        Samuel Skinner was seen today for follow-up. Diagnoses and all orders for this visit:    Tobacco abuse    Encounter for screening for lung cancer    At risk for osteopenia  -     DEXA BONE DENSITY 2 SITES; Future    Post-menopausal  -     DEXA BONE DENSITY 2 SITES; Future       IDC (R. breast) stage IIIc with 17 of 21 lymph nodes positive, ER, PA and HER-2/liana positive. Only received 1 cycle of TCH due to toxicity. Completed adjuvant radiation therapy. Continues with adjuvant endocrine therapy with Femara. Anticipated total of 10 year dosing, December 2025.  - CMP, CBC and tumor markers prior to 6 month follow-up  - continue Femara through Dec 2025  -Breast exam was unremarkable on 06/21/22. Left breast mastectomy site well-healed. Aromatase inhibitor therapy-associated arthralgia. Presently tolerable and declines need for intervention at this time. Mild arthralgia and mild hot flash. Both tolerable. Tobacco abuse  We talked about the importance of quitting smoking. She is ready yet to quit. Health Maintenance: The patient is encouraged to follow-up with primary care regularly for further recommendations regarding age appropriated screening for cancer, well-being visit (preventative  measures), follow-up and treatment of other medical comorbidities. Last CT low-dose lung cancer screening February 2020. Repeat CT low-dose lung cancer Oct 2022.  9/26/2022 CT Low Dose Lung Screening ([facility]) Central lobular emphysematous disease with no evidence of malignancy. No evidence of groundglass opacities or consolidative infiltrates. Remote right mastectomy. Repeat mammogram Oct 2022  10/3/2022 Mammogram [facility])  No mammographic evidence of malignancy.  Stable exam.    FOLLOW UP:  RTC with MD in [place] in Oct after scans  CBC, CMP, CEA ,CA 15-3, CA 27-29 prior to next visit  CT chest low dose lung cancer screening in Oct 2023  Screening mammogram in Oct 2023  Repeat Bone density prior to next MD visit  Continue Letrozole 2.5mg daily  Follow-up with other medical providers as recommended       Follow Up:     No follow-ups on file. Data Brittney Valente am pre charting  as Medical Assistant for Nagi Tovar MD. Electronically signed by Davin Prakash MA on 11/8/2022 at 12:09 PM CDT. Yumi Zapata am scribing for Nagi Tovar MD. Electronically signed by Rodrick Solomon RN on 11/8/2022 at 3:39 PM CST. I, Dr Christie Leyva, personally performed the services described in this documentation as scribed by Rodrick Solomon, RN in my presence and is both accurate and complete. I have seen, examined and reviewed this patient medication list, appropriate labs and imaging studies. I reviewed relevant medical records and others physicians notes. I discussed the plans of care with the patient. I answered all the questions to the patients satisfaction. I have also reviewed the chief complaint (CC) and part of the history (History of Present Illness (HPI), Past Family Social History [facility]), or Review of Systems (ROS) and made changes when appropriated.        (Please note that portions of this note were completed with a voice recognition program. Efforts were made to edit the dictations but occasionally words are mis-transcribed.)  Electronically signed by Nagi Tovar MD on 11/8/2022 at 3:43 PM

## 2022-11-08 ENCOUNTER — OFFICE VISIT (OUTPATIENT)
Dept: HEMATOLOGY | Age: 67
End: 2022-11-08
Payer: MEDICARE

## 2022-11-08 VITALS
DIASTOLIC BLOOD PRESSURE: 62 MMHG | HEIGHT: 59 IN | HEART RATE: 90 BPM | BODY MASS INDEX: 26 KG/M2 | OXYGEN SATURATION: 97 % | SYSTOLIC BLOOD PRESSURE: 100 MMHG | WEIGHT: 129 LBS

## 2022-11-08 DIAGNOSIS — Z71.89 CARE PLAN DISCUSSED WITH PATIENT: ICD-10-CM

## 2022-11-08 DIAGNOSIS — Z91.89 AT RISK FOR OSTEOPENIA: ICD-10-CM

## 2022-11-08 DIAGNOSIS — Z79.811 ENCOUNTER FOR MONITORING AROMATASE INHIBITOR THERAPY: ICD-10-CM

## 2022-11-08 DIAGNOSIS — Z12.2 ENCOUNTER FOR SCREENING FOR LUNG CANCER: ICD-10-CM

## 2022-11-08 DIAGNOSIS — Z51.81 ENCOUNTER FOR MONITORING AROMATASE INHIBITOR THERAPY: ICD-10-CM

## 2022-11-08 DIAGNOSIS — Z78.0 POST-MENOPAUSAL: ICD-10-CM

## 2022-11-08 DIAGNOSIS — C50.911 INVASIVE DUCTAL CARCINOMA OF BREAST, FEMALE, RIGHT (HCC): ICD-10-CM

## 2022-11-08 DIAGNOSIS — Z72.0 TOBACCO ABUSE: Primary | ICD-10-CM

## 2022-11-08 PROCEDURE — 1123F ACP DISCUSS/DSCN MKR DOCD: CPT | Performed by: INTERNAL MEDICINE

## 2022-11-08 PROCEDURE — G8427 DOCREV CUR MEDS BY ELIG CLIN: HCPCS | Performed by: INTERNAL MEDICINE

## 2022-11-08 PROCEDURE — G8484 FLU IMMUNIZE NO ADMIN: HCPCS | Performed by: INTERNAL MEDICINE

## 2022-11-08 PROCEDURE — G8400 PT W/DXA NO RESULTS DOC: HCPCS | Performed by: INTERNAL MEDICINE

## 2022-11-08 PROCEDURE — 3017F COLORECTAL CA SCREEN DOC REV: CPT | Performed by: INTERNAL MEDICINE

## 2022-11-08 PROCEDURE — 99213 OFFICE O/P EST LOW 20 MIN: CPT | Performed by: INTERNAL MEDICINE

## 2022-11-08 PROCEDURE — G8417 CALC BMI ABV UP PARAM F/U: HCPCS | Performed by: INTERNAL MEDICINE

## 2022-11-08 PROCEDURE — 4004F PT TOBACCO SCREEN RCVD TLK: CPT | Performed by: INTERNAL MEDICINE

## 2022-11-08 PROCEDURE — 1090F PRES/ABSN URINE INCON ASSESS: CPT | Performed by: INTERNAL MEDICINE

## 2023-05-09 ENCOUNTER — OFFICE VISIT (OUTPATIENT)
Dept: HEMATOLOGY | Age: 68
End: 2023-05-09
Payer: MEDICARE

## 2023-05-09 VITALS
HEART RATE: 97 BPM | BODY MASS INDEX: 25.4 KG/M2 | WEIGHT: 126 LBS | HEIGHT: 59 IN | SYSTOLIC BLOOD PRESSURE: 120 MMHG | OXYGEN SATURATION: 97 % | DIASTOLIC BLOOD PRESSURE: 58 MMHG

## 2023-05-09 DIAGNOSIS — C50.211 MALIGNANT NEOPLASM OF UPPER-INNER QUADRANT OF RIGHT BREAST IN FEMALE, ESTROGEN RECEPTOR POSITIVE (HCC): Primary | ICD-10-CM

## 2023-05-09 DIAGNOSIS — Z72.0 TOBACCO ABUSE: ICD-10-CM

## 2023-05-09 DIAGNOSIS — Z79.811 ENCOUNTER FOR MONITORING AROMATASE INHIBITOR THERAPY: ICD-10-CM

## 2023-05-09 DIAGNOSIS — Z12.31 ENCOUNTER FOR SCREENING MAMMOGRAM FOR MALIGNANT NEOPLASM OF BREAST: ICD-10-CM

## 2023-05-09 DIAGNOSIS — Z51.81 ENCOUNTER FOR MONITORING AROMATASE INHIBITOR THERAPY: ICD-10-CM

## 2023-05-09 DIAGNOSIS — Z17.0 MALIGNANT NEOPLASM OF UPPER-INNER QUADRANT OF RIGHT BREAST IN FEMALE, ESTROGEN RECEPTOR POSITIVE (HCC): Primary | ICD-10-CM

## 2023-05-09 DIAGNOSIS — Z12.2 ENCOUNTER FOR SCREENING FOR LUNG CANCER: ICD-10-CM

## 2023-05-09 DIAGNOSIS — Z87.891 HISTORY OF SMOKING GREATER THAN 50 PACK YEARS: ICD-10-CM

## 2023-05-09 DIAGNOSIS — Z71.89 CARE PLAN DISCUSSED WITH PATIENT: ICD-10-CM

## 2023-05-09 PROCEDURE — G8417 CALC BMI ABV UP PARAM F/U: HCPCS | Performed by: INTERNAL MEDICINE

## 2023-05-09 PROCEDURE — 1123F ACP DISCUSS/DSCN MKR DOCD: CPT | Performed by: INTERNAL MEDICINE

## 2023-05-09 PROCEDURE — 4004F PT TOBACCO SCREEN RCVD TLK: CPT | Performed by: INTERNAL MEDICINE

## 2023-05-09 PROCEDURE — 99214 OFFICE O/P EST MOD 30 MIN: CPT | Performed by: INTERNAL MEDICINE

## 2023-05-09 PROCEDURE — 1090F PRES/ABSN URINE INCON ASSESS: CPT | Performed by: INTERNAL MEDICINE

## 2023-05-09 PROCEDURE — G8427 DOCREV CUR MEDS BY ELIG CLIN: HCPCS | Performed by: INTERNAL MEDICINE

## 2023-05-09 PROCEDURE — 3017F COLORECTAL CA SCREEN DOC REV: CPT | Performed by: INTERNAL MEDICINE

## 2023-05-09 PROCEDURE — G8400 PT W/DXA NO RESULTS DOC: HCPCS | Performed by: INTERNAL MEDICINE

## 2023-09-15 ENCOUNTER — TELEPHONE (OUTPATIENT)
Dept: HEMATOLOGY | Age: 68
End: 2023-09-15

## 2023-09-15 NOTE — TELEPHONE ENCOUNTER
Called for prior authorization for CT Lung Screen at [facility] and was told by PAMELA Garza that a prior Roselyn Burrell is not required.   [facility] [phone]

## 2023-11-09 NOTE — PROGRESS NOTES
MEDICAL ONCOLOGY PROGRESS NOTE      Zeinab Brie [facility]   1955 11/14/2023     Chief Complaint   Patient presents with    Follow-up     Malignant neoplasm of upper-inner quadrant of right breast in female, estrogen receptor positive ([address])      INTERVAL HISTORY/HISTORY OF PRESENT ILLNESS:  Peg Meza is a 77 y.o.  female with significant PMH invasive ductal carcinoma of the right breast, ER, ND and HER-2/liana positive, stage IIIc, August 2015. Pat Ortiz is presently taking adjuvant endocrine therapy with Femara for an anticipated total of 10 years dosing, anticipate completion December 2025. She presents today in routine 6 month follow-up and evaluation of tolerance to Femara. Pat Ortiz reports tolerating treatment with complaints of mild hot flashes and stable arthralgias secondary to the Femara. Mild back pain. Back pain is new. She had a screening mammogram Sep, 2022 that was category 1. She denies any new complaints. The patient denies any new breast complaints. Denies any new respiratory symptoms. Unfortunately, she continues to smoke. ONCOLOGIC HISTORY:    Diagnosis  Invasive ductal carcinoma, August 2015   ER 92%/ND 90%/HER-2/liana Fish positive, Ki-67 22%, grade 3     [id number] (stage IIIC)   BMD Oct 2015- normal     Treatment summary  8/20/2015- excisional biopsies positive margins   Right modified radical mastectomy with axillary dissection   She received one cycle TCH-P with grade 4 toxicity and declined further adjuvant chemotherapy   2/11/2016-she completed adjuvant radiation 6040cGy   Herceptin 1 year   Adjuvant endocrine therapy with Femara started December 2015-Dec 2025     Cancer history  Ms Peg Meza was first seen by me in on 1/30/2018 referred by Dr. Duarte Rucker. 8/8/2015-screening mammogram at [facility] showed a 3.6 cm mass at 12 o'clock position.    8/20/2015-excisional biopsy by Dr. Vignesh Zamudio was compatible with invasive ductal carcinoma, grade 3, measuring 4.5 cm with

## 2023-11-10 DIAGNOSIS — Z17.0 MALIGNANT NEOPLASM OF UPPER-INNER QUADRANT OF RIGHT BREAST IN FEMALE, ESTROGEN RECEPTOR POSITIVE (HCC): Primary | ICD-10-CM

## 2023-11-10 DIAGNOSIS — C50.211 MALIGNANT NEOPLASM OF UPPER-INNER QUADRANT OF RIGHT BREAST IN FEMALE, ESTROGEN RECEPTOR POSITIVE (HCC): Primary | ICD-10-CM

## 2023-11-13 ENCOUNTER — TELEPHONE (OUTPATIENT)
Dept: HEMATOLOGY | Age: 68
End: 2023-11-13

## 2023-11-13 NOTE — TELEPHONE ENCOUNTER
Called patient today for their upcoming appointment on 11/14/23. Patient aware of their appointment and date and time was given. Patient confirmed their appointment.

## 2023-11-14 ENCOUNTER — TELEPHONE (OUTPATIENT)
Dept: HEMATOLOGY | Age: 68
End: 2023-11-14

## 2023-11-14 ENCOUNTER — HOSPITAL ENCOUNTER (OUTPATIENT)
Dept: INFUSION THERAPY | Age: 68
Discharge: HOME | End: 2023-11-14
Payer: MEDICARE

## 2023-11-14 ENCOUNTER — TRANSCRIBE ORDERS (OUTPATIENT)
Dept: ADMINISTRATIVE | Facility: HOSPITAL | Age: 68
End: 2023-11-14
Payer: COMMERCIAL

## 2023-11-14 ENCOUNTER — OFFICE VISIT (OUTPATIENT)
Dept: HEMATOLOGY | Age: 68
End: 2023-11-14
Payer: MEDICARE

## 2023-11-14 VITALS
SYSTOLIC BLOOD PRESSURE: 130 MMHG | WEIGHT: 114 LBS | OXYGEN SATURATION: 95 % | BODY MASS INDEX: 22.98 KG/M2 | DIASTOLIC BLOOD PRESSURE: 62 MMHG | HEART RATE: 111 BPM | HEIGHT: 59 IN

## 2023-11-14 DIAGNOSIS — Z51.81 ENCOUNTER FOR MONITORING AROMATASE INHIBITOR THERAPY: ICD-10-CM

## 2023-11-14 DIAGNOSIS — Z72.0 TOBACCO ABUSE: ICD-10-CM

## 2023-11-14 DIAGNOSIS — Z17.0 MALIGNANT NEOPLASM OF UPPER-INNER QUADRANT OF RIGHT BREAST IN FEMALE, ESTROGEN RECEPTOR POSITIVE (HCC): ICD-10-CM

## 2023-11-14 DIAGNOSIS — Z87.891 PERSONAL HISTORY OF TOBACCO USE, PRESENTING HAZARDS TO HEALTH: ICD-10-CM

## 2023-11-14 DIAGNOSIS — Z79.811 ENCOUNTER FOR MONITORING AROMATASE INHIBITOR THERAPY: ICD-10-CM

## 2023-11-14 DIAGNOSIS — C50.211 MALIGNANT NEOPLASM OF UPPER-INNER QUADRANT OF RIGHT BREAST IN FEMALE, ESTROGEN RECEPTOR POSITIVE (HCC): Primary | ICD-10-CM

## 2023-11-14 DIAGNOSIS — Z71.89 CARE PLAN DISCUSSED WITH PATIENT: ICD-10-CM

## 2023-11-14 DIAGNOSIS — Z17.0 MALIGNANT NEOPLASM OF UPPER-INNER QUADRANT OF RIGHT BREAST IN FEMALE, ESTROGEN RECEPTOR POSITIVE (HCC): Primary | ICD-10-CM

## 2023-11-14 DIAGNOSIS — C50.211 MALIGNANT NEOPLASM OF UPPER-INNER QUADRANT OF RIGHT BREAST IN FEMALE, ESTROGEN RECEPTOR POSITIVE (HCC): ICD-10-CM

## 2023-11-14 DIAGNOSIS — Z12.2 SCREENING FOR MALIGNANT NEOPLASM OF RESPIRATORY ORGAN: Primary | ICD-10-CM

## 2023-11-14 PROCEDURE — G8427 DOCREV CUR MEDS BY ELIG CLIN: HCPCS | Performed by: INTERNAL MEDICINE

## 2023-11-14 PROCEDURE — 3017F COLORECTAL CA SCREEN DOC REV: CPT | Performed by: INTERNAL MEDICINE

## 2023-11-14 PROCEDURE — 1090F PRES/ABSN URINE INCON ASSESS: CPT | Performed by: INTERNAL MEDICINE

## 2023-11-14 PROCEDURE — G8400 PT W/DXA NO RESULTS DOC: HCPCS | Performed by: INTERNAL MEDICINE

## 2023-11-14 PROCEDURE — 99212 OFFICE O/P EST SF 10 MIN: CPT

## 2023-11-14 PROCEDURE — 4004F PT TOBACCO SCREEN RCVD TLK: CPT | Performed by: INTERNAL MEDICINE

## 2023-11-14 PROCEDURE — G8420 CALC BMI NORM PARAMETERS: HCPCS | Performed by: INTERNAL MEDICINE

## 2023-11-14 PROCEDURE — 1123F ACP DISCUSS/DSCN MKR DOCD: CPT | Performed by: INTERNAL MEDICINE

## 2023-11-14 PROCEDURE — G8484 FLU IMMUNIZE NO ADMIN: HCPCS | Performed by: INTERNAL MEDICINE

## 2023-11-14 PROCEDURE — 99213 OFFICE O/P EST LOW 20 MIN: CPT | Performed by: INTERNAL MEDICINE

## 2023-11-14 NOTE — TELEPHONE ENCOUNTER
Left message regarding CT Lung Screen scheduled at [facility] 9/19/24 at 1100.

## 2024-04-26 DIAGNOSIS — C50.211 MALIGNANT NEOPLASM OF UPPER-INNER QUADRANT OF RIGHT BREAST IN FEMALE, ESTROGEN RECEPTOR POSITIVE (HCC): Primary | ICD-10-CM

## 2024-04-26 DIAGNOSIS — Z17.0 MALIGNANT NEOPLASM OF UPPER-INNER QUADRANT OF RIGHT BREAST IN FEMALE, ESTROGEN RECEPTOR POSITIVE (HCC): Primary | ICD-10-CM

## 2024-05-02 ENCOUNTER — TELEPHONE (OUTPATIENT)
Dept: HEMATOLOGY | Age: 69
End: 2024-05-02

## 2024-05-02 NOTE — TELEPHONE ENCOUNTER
I have attempted to call patient with new appointment modifications on 5/21/24 due to  not being in [facility].  I have provided patient with new appointment on 6/4/24 at 2:20. I have requested for patient to contact office if she has any questions or concerns of new appointment.

## 2024-05-30 ENCOUNTER — TELEPHONE (OUTPATIENT)
Dept: HEMATOLOGY | Age: 69
End: 2024-05-30

## 2024-05-30 NOTE — TELEPHONE ENCOUNTER
Called pt. to remind them of appointment on 06/4/2024 and had to leave a detailed voicemail with appointment date and time.

## 2024-06-03 NOTE — PROGRESS NOTES
MEDICAL ONCOLOGY PROGRESS NOTE      Tara Gustafson   1955 6/4/2024     Chief Complaint   Patient presents with    Follow-up     Malignant neoplasm of upper-inner quadrant of right breast in female, estrogen receptor positive (HCC)      INTERVAL HISTORY/HISTORY OF PRESENT ILLNESS:  Tara Gustafson is a 68 y.o.  female with significant PMH invasive ductal carcinoma of the right breast, ER, GA and HER-2/liana positive, stage IIIc, August 2015. Tara is presently taking adjuvant endocrine therapy with Femara for an anticipated total of 10 years dosing, anticipate completion December 2025. She presents today in routine 6 month follow-up and evaluation of tolerance to Femara. Tara reports tolerating treatment with complaints of mild hot flashes and stable arthralgias secondary to the Femara. Mild back pain.  Back pain is new.  She had a screening mammogram Sep, 2022 that was category 1.  She denies any new complaints.  The patient denies any new breast complaints.  Denies any new respiratory symptoms.  Unfortunately, she continues to smoke.     ONCOLOGIC HISTORY:    Diagnosis  Invasive ductal carcinoma, August 2015   ER 92%/GA 90%/HER-2/liana Fish positive, Ki-67 22%, grade 3     [id number] (stage IIIC)   BMD Oct 2015- normal     Treatment summary  8/20/2015- excisional biopsies positive margins   Right modified radical mastectomy with axillary dissection   She received one cycle TCH-P with grade 4 toxicity and declined further adjuvant chemotherapy   2/11/2016-she completed adjuvant radiation 6040cGy   Herceptin 1 year   Adjuvant endocrine therapy with Femara started December 2015-Dec 2025     Cancer history  Ms Taar Gustafson was first seen by me in on 1/30/2018 referred by Dr. Rico.  8/8/2015-screening mammogram at [facility] showed a 3.6 cm mass at 12 o'clock position.   8/20/2015-excisional biopsy by Dr. Monae was compatible with invasive ductal carcinoma, grade 3, measuring 4.5 cm with

## 2024-06-04 ENCOUNTER — OFFICE VISIT (OUTPATIENT)
Dept: HEMATOLOGY | Age: 69
End: 2024-06-04
Payer: MEDICARE

## 2024-06-04 VITALS
BODY MASS INDEX: 20.96 KG/M2 | DIASTOLIC BLOOD PRESSURE: 52 MMHG | OXYGEN SATURATION: 95 % | WEIGHT: 104 LBS | HEART RATE: 101 BPM | SYSTOLIC BLOOD PRESSURE: 100 MMHG | HEIGHT: 59 IN

## 2024-06-04 DIAGNOSIS — Z79.811 ENCOUNTER FOR MONITORING AROMATASE INHIBITOR THERAPY: ICD-10-CM

## 2024-06-04 DIAGNOSIS — Z71.89 CARE PLAN DISCUSSED WITH PATIENT: ICD-10-CM

## 2024-06-04 DIAGNOSIS — R61 NIGHT SWEATS: ICD-10-CM

## 2024-06-04 DIAGNOSIS — Z51.81 ENCOUNTER FOR MONITORING AROMATASE INHIBITOR THERAPY: ICD-10-CM

## 2024-06-04 DIAGNOSIS — Z17.0 MALIGNANT NEOPLASM OF UPPER-INNER QUADRANT OF RIGHT BREAST IN FEMALE, ESTROGEN RECEPTOR POSITIVE (HCC): Primary | ICD-10-CM

## 2024-06-04 DIAGNOSIS — R97.8 ABNORMAL TUMOR MARKERS: ICD-10-CM

## 2024-06-04 DIAGNOSIS — F17.200 CURRENT SMOKER: ICD-10-CM

## 2024-06-04 DIAGNOSIS — C50.211 MALIGNANT NEOPLASM OF UPPER-INNER QUADRANT OF RIGHT BREAST IN FEMALE, ESTROGEN RECEPTOR POSITIVE (HCC): Primary | ICD-10-CM

## 2024-06-04 PROCEDURE — 1123F ACP DISCUSS/DSCN MKR DOCD: CPT | Performed by: INTERNAL MEDICINE

## 2024-06-04 PROCEDURE — 4004F PT TOBACCO SCREEN RCVD TLK: CPT | Performed by: INTERNAL MEDICINE

## 2024-06-04 PROCEDURE — 3017F COLORECTAL CA SCREEN DOC REV: CPT | Performed by: INTERNAL MEDICINE

## 2024-06-04 PROCEDURE — 1090F PRES/ABSN URINE INCON ASSESS: CPT | Performed by: INTERNAL MEDICINE

## 2024-06-04 PROCEDURE — G8400 PT W/DXA NO RESULTS DOC: HCPCS | Performed by: INTERNAL MEDICINE

## 2024-06-04 PROCEDURE — G8420 CALC BMI NORM PARAMETERS: HCPCS | Performed by: INTERNAL MEDICINE

## 2024-06-04 PROCEDURE — 99214 OFFICE O/P EST MOD 30 MIN: CPT | Performed by: INTERNAL MEDICINE

## 2024-06-04 PROCEDURE — G8427 DOCREV CUR MEDS BY ELIG CLIN: HCPCS | Performed by: INTERNAL MEDICINE

## 2024-06-04 PROCEDURE — G2211 COMPLEX E/M VISIT ADD ON: HCPCS | Performed by: INTERNAL MEDICINE

## 2024-07-12 ENCOUNTER — TELEPHONE (OUTPATIENT)
Dept: HEMATOLOGY | Age: 69
End: 2024-07-12

## 2024-07-12 NOTE — TELEPHONE ENCOUNTER
Called pt. to remind them of appointment on 07/16/2024 and had to leave a detailed voicemail with appointment date and time. Reminded patient to just come at appointment time, and to not come at the lab appointment time. Reminded patient that we will not check them in any more than 30 minutes before appointment time.

## 2024-07-15 NOTE — PROGRESS NOTES
regarding age appropriated screening for cancer, well-being visit (preventative  measures), follow-up and treatment of other medical comorbidities.  Last CT low-dose lung cancer screening February 2020.  Repeat CT low-dose lung cancer Oct 2022.  -Colonoscopy normal    Osteopenia-bone mineral density 3/31/2023  - T score 0.3 lumbar spine, T score -1.1 left hip.  -Continue vitamin D    FOLLOW UP:  RTC with MD in [place] 2 weeks after scans  CBC, CMP, CEA ,CA 15-3, CA 27-29 prior to next visit in 6 weeks  CT Chest, Abd/Pelvis and Bone scan next available @ [facility]  Continue Letrozole 2.5mg daily  CT chest low dose lung cancer screening in Oct 2024  Follow-up with other medical providers as recommended    Follow Up:   Return in about 2 weeks (around 7/30/2024) for Appointment with Dr. Rodriguez/after CT scans .   Sched CT C/A/P next available @ [facility]     Claudia MORALES am pre-charting as a registered nurse for Long Rodriguez MD. Electronically signed by Claudia Lima RN on 7/16/2024 at 1:44 PM CDT.  I have seen, examined and reviewed this patient medication list, appropriate labs and imaging studies. I reviewed relevant medical records and others physician’s notes. I discussed the plans of care with the patient. I answered all the questions to the patient’s satisfaction. I have also reviewed the chief complaint (CC) and part of the history (History of Present Illness (HPI), Past Family Social History (PFSH), or Review of Systems (ROS) and made changes when appropriated.       (Please note that portions of this note were completed with a voice recognition program. Efforts were made to edit the dictations but occasionally words are mis-transcribed.)Electronically signed by Long Rodriguez MD on 7/16/2024 at 3:58 PM

## 2024-07-16 ENCOUNTER — TRANSCRIBE ORDERS (OUTPATIENT)
Dept: ADMINISTRATIVE | Facility: HOSPITAL | Age: 69
End: 2024-07-16
Payer: COMMERCIAL

## 2024-07-16 ENCOUNTER — OFFICE VISIT (OUTPATIENT)
Dept: HEMATOLOGY | Age: 69
End: 2024-07-16
Payer: MEDICARE

## 2024-07-16 VITALS
WEIGHT: 108 LBS | DIASTOLIC BLOOD PRESSURE: 60 MMHG | SYSTOLIC BLOOD PRESSURE: 92 MMHG | OXYGEN SATURATION: 96 % | BODY MASS INDEX: 21.77 KG/M2 | HEART RATE: 96 BPM | HEIGHT: 59 IN

## 2024-07-16 DIAGNOSIS — Z71.89 CARE PLAN DISCUSSED WITH PATIENT: ICD-10-CM

## 2024-07-16 DIAGNOSIS — Z79.811 ENCOUNTER FOR MONITORING AROMATASE INHIBITOR THERAPY: ICD-10-CM

## 2024-07-16 DIAGNOSIS — R61 NIGHT SWEATS: ICD-10-CM

## 2024-07-16 DIAGNOSIS — C50.211 MALIGNANT NEOPLASM OF UPPER-INNER QUADRANT OF RIGHT BREAST IN FEMALE, ESTROGEN RECEPTOR POSITIVE: Primary | ICD-10-CM

## 2024-07-16 DIAGNOSIS — Z51.81 ENCOUNTER FOR MONITORING AROMATASE INHIBITOR THERAPY: ICD-10-CM

## 2024-07-16 DIAGNOSIS — Z17.0 MALIGNANT NEOPLASM OF UPPER-INNER QUADRANT OF RIGHT BREAST IN FEMALE, ESTROGEN RECEPTOR POSITIVE: Primary | ICD-10-CM

## 2024-07-16 DIAGNOSIS — Z17.0 MALIGNANT NEOPLASM OF UPPER-INNER QUADRANT OF RIGHT BREAST IN FEMALE, ESTROGEN RECEPTOR POSITIVE (HCC): Primary | ICD-10-CM

## 2024-07-16 DIAGNOSIS — C50.211 MALIGNANT NEOPLASM OF UPPER-INNER QUADRANT OF RIGHT BREAST IN FEMALE, ESTROGEN RECEPTOR POSITIVE (HCC): Primary | ICD-10-CM

## 2024-07-16 PROCEDURE — 1090F PRES/ABSN URINE INCON ASSESS: CPT | Performed by: INTERNAL MEDICINE

## 2024-07-16 PROCEDURE — G8400 PT W/DXA NO RESULTS DOC: HCPCS | Performed by: INTERNAL MEDICINE

## 2024-07-16 PROCEDURE — 4004F PT TOBACCO SCREEN RCVD TLK: CPT | Performed by: INTERNAL MEDICINE

## 2024-07-16 PROCEDURE — 99214 OFFICE O/P EST MOD 30 MIN: CPT | Performed by: INTERNAL MEDICINE

## 2024-07-16 PROCEDURE — 3017F COLORECTAL CA SCREEN DOC REV: CPT | Performed by: INTERNAL MEDICINE

## 2024-07-16 PROCEDURE — G2211 COMPLEX E/M VISIT ADD ON: HCPCS | Performed by: INTERNAL MEDICINE

## 2024-07-16 PROCEDURE — G8427 DOCREV CUR MEDS BY ELIG CLIN: HCPCS | Performed by: INTERNAL MEDICINE

## 2024-07-16 PROCEDURE — 1123F ACP DISCUSS/DSCN MKR DOCD: CPT | Performed by: INTERNAL MEDICINE

## 2024-07-16 PROCEDURE — G8420 CALC BMI NORM PARAMETERS: HCPCS | Performed by: INTERNAL MEDICINE

## 2024-07-18 ENCOUNTER — HOSPITAL ENCOUNTER (OUTPATIENT)
Dept: NUCLEAR MEDICINE | Facility: HOSPITAL | Age: 69
Discharge: HOME | End: 2024-07-18
Payer: MEDICARE

## 2024-07-18 ENCOUNTER — HOSPITAL ENCOUNTER (OUTPATIENT)
Dept: CT IMAGING | Facility: HOSPITAL | Age: 69
Discharge: HOME | End: 2024-07-18
Admitting: INTERNAL MEDICINE
Payer: MEDICARE

## 2024-07-18 PROCEDURE — 0 TECHNETIUM OXIDRONATE KIT: Performed by: INTERNAL MEDICINE

## 2024-07-18 PROCEDURE — A9561 TC99M OXIDRONATE: HCPCS | Performed by: INTERNAL MEDICINE

## 2024-07-18 PROCEDURE — 25510000001 IOPAMIDOL 61 % SOLUTION: Performed by: INTERNAL MEDICINE

## 2024-07-18 PROCEDURE — 78306 BONE IMAGING WHOLE BODY: CPT

## 2024-07-18 PROCEDURE — 74177 CT ABD & PELVIS W/CONTRAST: CPT

## 2024-07-18 PROCEDURE — 71260 CT THORAX DX C+: CPT

## 2024-07-18 RX ADMIN — IOPAMIDOL 100 ML: 612 INJECTION, SOLUTION INTRAVENOUS at 11:03

## 2024-07-18 RX ADMIN — TECHNETIUM TC 99M OXIDRONATE 1 DOSE: 3.15 INJECTION, POWDER, LYOPHILIZED, FOR SOLUTION INTRAVENOUS at 08:20

## 2024-07-22 ENCOUNTER — TELEPHONE (OUTPATIENT)
Dept: HEMATOLOGY | Age: 69
End: 2024-07-22

## 2024-07-22 NOTE — TELEPHONE ENCOUNTER
Called Patient and reminded patient of their appointment on 07/30/2024 and patient confirmed they would be here.

## 2024-07-27 NOTE — PROGRESS NOTES
MEDICAL ONCOLOGY PROGRESS NOTE      Tara Gustafson   1955 7/30/2024     Chief Complaint   Patient presents with    Follow-up     Malignant neoplasm of upper-inner quadrant of right breast in female, estrogen receptor positive (HCC)      INTERVAL HISTORY/HISTORY OF PRESENT ILLNESS:  Tara Gustafson is a 69 y.o.  female with significant PMH invasive ductal carcinoma of the right breast, ER, PA and HER-2/liana positive, stage IIIc, August 2015. Tara is presently taking adjuvant endocrine therapy with Femara for an anticipated total of 10 years dosing, anticipate completion December 2025. She presents today in routine 6 month follow-up and evaluation of tolerance to Femara. Tara reports tolerating treatment with complaints of mild hot flashes and stable arthralgias secondary to the Femara.  Patient had repeat CT chest abdomen pelvis and bone scan performed since last visit.  She denies any new complaints.     ONCOLOGIC HISTORY:    Diagnosis  Invasive ductal carcinoma, August 2015   ER 92%/PA 90%/HER-2/liana Fish positive, Ki-67 22%, grade 3     [id number] (stage IIIC)   BMD Oct 2015- normal     Treatment summary  8/20/2015- excisional biopsies positive margins   Right modified radical mastectomy with axillary dissection   She received one cycle TCH-P with grade 4 toxicity and declined further adjuvant chemotherapy   2/11/2016-she completed adjuvant radiation 6040cGy   Herceptin 1 year   Adjuvant endocrine therapy with Femara started December 2015-Dec 2025     Cancer history  Ms Tara Gustafson was first seen by me in on 1/30/2018 referred by Dr. Rico.  8/8/2015-screening mammogram at [facility] showed a 3.6 cm mass at 12 o'clock position.   8/20/2015-excisional biopsy by Dr. Monae was compatible with invasive ductal carcinoma, grade 3, measuring 4.5 cm with positive surgical margins. ER 92%/PA 90%/HER-2/liana Fish positive, Ki-67 22%   9/3/2015 Right modified radical mastectomy with axillary

## 2024-07-30 ENCOUNTER — OFFICE VISIT (OUTPATIENT)
Dept: HEMATOLOGY | Age: 69
End: 2024-07-30

## 2024-07-30 VITALS
SYSTOLIC BLOOD PRESSURE: 140 MMHG | BODY MASS INDEX: 20.96 KG/M2 | HEART RATE: 90 BPM | DIASTOLIC BLOOD PRESSURE: 62 MMHG | HEIGHT: 59 IN | WEIGHT: 104 LBS | OXYGEN SATURATION: 96 %

## 2024-07-30 DIAGNOSIS — Z79.811 ENCOUNTER FOR MONITORING AROMATASE INHIBITOR THERAPY: ICD-10-CM

## 2024-07-30 DIAGNOSIS — C50.211 MALIGNANT NEOPLASM OF UPPER-INNER QUADRANT OF RIGHT BREAST IN FEMALE, ESTROGEN RECEPTOR POSITIVE (HCC): Primary | ICD-10-CM

## 2024-07-30 DIAGNOSIS — Z71.89 CARE PLAN DISCUSSED WITH PATIENT: ICD-10-CM

## 2024-07-30 DIAGNOSIS — Z51.81 ENCOUNTER FOR MONITORING AROMATASE INHIBITOR THERAPY: ICD-10-CM

## 2024-07-30 DIAGNOSIS — Z17.0 MALIGNANT NEOPLASM OF UPPER-INNER QUADRANT OF RIGHT BREAST IN FEMALE, ESTROGEN RECEPTOR POSITIVE (HCC): Primary | ICD-10-CM

## 2024-07-30 PROCEDURE — G2211 COMPLEX E/M VISIT ADD ON: HCPCS | Performed by: INTERNAL MEDICINE

## 2024-07-30 PROCEDURE — 99214 OFFICE O/P EST MOD 30 MIN: CPT | Performed by: INTERNAL MEDICINE

## 2024-07-30 PROCEDURE — 1123F ACP DISCUSS/DSCN MKR DOCD: CPT | Performed by: INTERNAL MEDICINE

## 2024-08-07 ENCOUNTER — PATIENT ROUNDING (BHMG ONLY) (OUTPATIENT)
Dept: FAMILY MEDICINE CLINIC | Facility: CLINIC | Age: 69
End: 2024-08-07
Payer: MEDICARE

## 2024-08-07 ENCOUNTER — OFFICE VISIT (OUTPATIENT)
Dept: FAMILY MEDICINE CLINIC | Facility: CLINIC | Age: 69
End: 2024-08-07
Payer: MEDICARE

## 2024-08-07 VITALS
TEMPERATURE: 96.8 F | WEIGHT: 105.38 LBS | SYSTOLIC BLOOD PRESSURE: 110 MMHG | BODY MASS INDEX: 21.24 KG/M2 | DIASTOLIC BLOOD PRESSURE: 60 MMHG | RESPIRATION RATE: 20 BRPM | HEIGHT: 59 IN | HEART RATE: 94 BPM | OXYGEN SATURATION: 98 %

## 2024-08-07 DIAGNOSIS — E78.00 HYPERCHOLESTEROLEMIA: ICD-10-CM

## 2024-08-07 DIAGNOSIS — M21.379 DROP FOOT GAIT: ICD-10-CM

## 2024-08-07 DIAGNOSIS — R53.83 OTHER FATIGUE: ICD-10-CM

## 2024-08-07 DIAGNOSIS — J43.9 PULMONARY EMPHYSEMA, UNSPECIFIED EMPHYSEMA TYPE: ICD-10-CM

## 2024-08-07 DIAGNOSIS — M79.2 NERVE PAIN: ICD-10-CM

## 2024-08-07 DIAGNOSIS — E03.9 HYPOTHYROIDISM, UNSPECIFIED TYPE: ICD-10-CM

## 2024-08-07 DIAGNOSIS — G47.00 INSOMNIA, UNSPECIFIED TYPE: ICD-10-CM

## 2024-08-07 DIAGNOSIS — E11.65 TYPE 2 DIABETES MELLITUS WITH HYPERGLYCEMIA, WITHOUT LONG-TERM CURRENT USE OF INSULIN: Primary | ICD-10-CM

## 2024-08-07 DIAGNOSIS — R29.818 SUSPECTED SLEEP APNEA: ICD-10-CM

## 2024-08-07 DIAGNOSIS — G62.9 NEUROPATHY: ICD-10-CM

## 2024-08-07 DIAGNOSIS — E55.9 VITAMIN D DEFICIENCY: ICD-10-CM

## 2024-08-07 DIAGNOSIS — I10 ESSENTIAL HYPERTENSION: ICD-10-CM

## 2024-08-07 DIAGNOSIS — Z11.59 ENCOUNTER FOR HEPATITIS C SCREENING TEST FOR LOW RISK PATIENT: ICD-10-CM

## 2024-08-07 DIAGNOSIS — Z51.81 MEDICATION MONITORING ENCOUNTER: ICD-10-CM

## 2024-08-07 DIAGNOSIS — Z98.890 HX OF LUMBOSACRAL SPINE SURGERY: ICD-10-CM

## 2024-08-07 PROCEDURE — 1159F MED LIST DOCD IN RCRD: CPT | Performed by: NURSE PRACTITIONER

## 2024-08-07 PROCEDURE — 1160F RVW MEDS BY RX/DR IN RCRD: CPT | Performed by: NURSE PRACTITIONER

## 2024-08-07 PROCEDURE — 1126F AMNT PAIN NOTED NONE PRSNT: CPT | Performed by: NURSE PRACTITIONER

## 2024-08-07 PROCEDURE — 3074F SYST BP LT 130 MM HG: CPT | Performed by: NURSE PRACTITIONER

## 2024-08-07 PROCEDURE — G2211 COMPLEX E/M VISIT ADD ON: HCPCS | Performed by: NURSE PRACTITIONER

## 2024-08-07 PROCEDURE — 3078F DIAST BP <80 MM HG: CPT | Performed by: NURSE PRACTITIONER

## 2024-08-07 PROCEDURE — 99215 OFFICE O/P EST HI 40 MIN: CPT | Performed by: NURSE PRACTITIONER

## 2024-08-07 RX ORDER — LEVOTHYROXINE SODIUM 0.1 MG/1
100 TABLET ORAL DAILY
COMMUNITY

## 2024-08-07 RX ORDER — GABAPENTIN 600 MG/1
600 TABLET ORAL 3 TIMES DAILY
Qty: 90 TABLET | Refills: 0 | Status: SHIPPED | OUTPATIENT
Start: 2024-08-07

## 2024-08-07 RX ORDER — HYDROCODONE BITARTRATE AND ACETAMINOPHEN 10; 325 MG/1; MG/1
1 TABLET ORAL EVERY 8 HOURS PRN
Qty: 90 TABLET | Refills: 0 | Status: SHIPPED | OUTPATIENT
Start: 2024-08-07

## 2024-08-07 RX ORDER — TRAZODONE HYDROCHLORIDE 100 MG/1
TABLET ORAL
COMMUNITY

## 2024-08-07 NOTE — PROGRESS NOTES
August 7, 2024    Hello, may I speak with Dayami Rossi?    My name is CHIDI    I am  with [facility]  [address] [address] [phone].    Before we get started may I verify your date of birth? 1955    I am calling to officially welcome you to our practice and ask about your recent visit. Is this a good time to talk? yes    Tell me about your visit with us. What things went well?  EVERY THING WAS GOOD NO COMPLAINTS WONDERFUL OFFICE       We're always looking for ways to make our patients' experiences even better. Do you have recommendations on ways we may improve?  no    Overall were you satisfied with your first visit to our practice? yes       I appreciate you taking the time to speak with me today. Is there anything else I can do for you? no      Thank you, and have a great day.      
99.8

## 2024-08-07 NOTE — Clinical Note
Can we get a copy of patient's bone density from Dr. Acuna's office.  He has a documented within one of his notes that it was performed in October 2023 but I cannot find this in care everywhere.

## 2024-08-07 NOTE — PROGRESS NOTES
JONY Kaur  [facility]   Family Medicine  [address]  [place] [address]  Phone: [phone]  Fax: [phone]         Chief Complaint:  Chief Complaint   Patient presents with    Establish Care     History:  Dayami Rossi is a 69 y.o. female.  History of Present Illness  The patient is a 68-year-old female who presents here to [facility].    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  She is currently on aromatase inhibitor therapy and is under the care of Dr. Acuna at [facility] oncology.  Patient reports chronic pain related to radiation she received for treatment of breast cancer.  She is currently prescribed gabapentin 600 mg 3 times daily as needed for this discomfort and for the numbness and tingling in her bilateral feet.    Chronic back pain: Patient is status post right L4-L5 microdiscectomy for disc herniation on 5/24/2017 per Dr. Rosa at [facility] neurosurgery.  She reports that since her surgery she has continued to struggle with low back pain,  neuropathy, and left foot drop.  She notes that she does have difficulty with gait at times.  She reports history of falls but has found it helpful to wear water shoes which has helped with stabilization.  Patient is prescribed Norco 10 mg p.o. 3 times daily as needed.    Type 2 diabetes: Patient currently prescribed glipizide 10 mg twice daily, and Jardiance 10 mg daily.  She states that she has had a hemoglobin A1c performed but she does not remember what her official reading was.  I am unable to obtain this from care everywhere.  Patient had diabetic eye exam performed greater than 1 year ago.    Insomnia: Stable on trazodone 100 mg p.o. nightly.    Depression, anxiety: Patient is currently prescribed Cymbalta 30 mg daily, Xanax 1 mg twice daily as needed.  She reports she is stable on these 2 therapy  and also attends counseling.  Xanax is prescribed by her psychiatrist    Hypertension: Patient's blood pressure well-controlled at 110/60.  Her current regimen is lisinopril 20 mg daily, Norvasc 5 mg daily.    Hypothyroidism: Patient currently prescribed 100 mcg daily of levothyroxine.  Patient's oncologist was concerned that patient's thyroid dosage could be too strong causing abnormal test results that he conducted.  Plan to draw additional thyroid labs today.     Her last bone density scan was done by Dr. Borden.     Her last mammogram was on 10/03/2023 or 10/04/2023.  She has a mammogram ordered for 10 of 2024.        IMMUNIZATIONS  She has had 2 pneumonia vaccines.  Unsure where she received these.         ROS:  Review of Systems   Constitutional:  Positive for fatigue. Negative for fever and unexpected weight change.   HENT:  Negative for congestion, ear pain, rhinorrhea, sinus pressure, sinus pain and voice change.    Eyes:  Negative for visual disturbance.   Respiratory:  Negative for shortness of breath and wheezing.    Cardiovascular:  Negative for chest pain and palpitations.   Gastrointestinal:  Negative for abdominal pain, nausea and vomiting.   Genitourinary:  Negative for dysuria and flank pain.   Musculoskeletal:  Positive for arthralgias and back pain. Negative for myalgias and neck pain.   Skin:  Negative for color change and rash.   Neurological:  Negative for dizziness, weakness, numbness and headaches.   Psychiatric/Behavioral:  Negative for behavioral problems, dysphoric mood, self-injury and sleep disturbance.         reports that she has been smoking cigarettes. She has a 22.5 pack-year smoking history. She has never used smokeless tobacco. She reports that she does not drink alcohol and does not use drugs.    Current Outpatient Medications   Medication Instructions    albuterol (PROVENTIL HFA;VENTOLIN HFA) 108 (90 Base) MCG/ACT inhaler 2 puffs, Inhalation, Every 4 Hours PRN    ALPRAZolam  "(XANAX) 1 mg, Oral, 2 Times Daily PRN    amLODIPine (NORVASC) 5 mg, Oral, Daily    Calcium Citrate-Vitamin D (CALCIUM + D PO) 1 tablet, Oral, Daily    DULoxetine HCl (DRIZALMA) 30 MG capsule 1 capsule, Oral, Daily    empagliflozin (JARDIANCE) 10 mg, Oral    gabapentin (NEURONTIN) 600 mg, Oral, 3 Times Daily    glipizide (GLUCOTROL) 10 mg, Oral, 2 Times Daily Before Meals    HYDROcodone-acetaminophen (NORCO)  MG per tablet 1 tablet, Oral, Every 8 Hours PRN    letrozole (FEMARA) 2.5 mg, Oral, Daily    levothyroxine (SYNTHROID, LEVOTHROID) 100 mcg, Oral, Daily    lisinopril (PRINIVIL,ZESTRIL) 20 MG tablet 1 tablet, Oral, Daily    multivitamin with minerals 1 tablet/day, Oral    potassium chloride (K-DUR) 10 MEQ CR tablet 10 mEq, Oral, Daily    simvastatin (ZOCOR) 10 mg, Oral, Nightly    traZODone (DESYREL) 100 MG tablet TAKE 1-3 TAB BY MOUTH EVERY DAY AT BEDTIME AS NEEDED FOR SLEEP       OBJECTIVE:  /60 (BP Location: Left arm, Patient Position: Sitting, Cuff Size: Adult)   Pulse 94   Temp 96.8 °F (36 °C) (Infrared)   Resp 20   Ht 149.9 cm (59\")   Wt 47.8 kg (105 lb 6 oz)   LMP  (LMP Unknown)   SpO2 98%   BMI 21.28 kg/m²    Physical Exam  Vitals and nursing note reviewed.   Constitutional:       Appearance: Normal appearance. She is well-developed.   HENT:      Head: Normocephalic and atraumatic.      Right Ear: Tympanic membrane, ear canal and external ear normal.      Left Ear: Tympanic membrane, ear canal and external ear normal.      Nose: Nose normal. No septal deviation, nasal tenderness or congestion.      Mouth/Throat:      Lips: Pink. No lesions.      Mouth: Mucous membranes are moist. No oral lesions.      Dentition: Normal dentition.      Pharynx: Oropharynx is clear. No pharyngeal swelling, oropharyngeal exudate or posterior oropharyngeal erythema.   Eyes:      General: Lids are normal. Vision grossly intact. No scleral icterus.        Right eye: No discharge.         Left eye: No " discharge.      Extraocular Movements: Extraocular movements intact.      Conjunctiva/sclera: Conjunctivae normal.      Right eye: Right conjunctiva is not injected.      Left eye: Left conjunctiva is not injected.      Pupils: Pupils are equal, round, and reactive to light.   Neck:      Thyroid: No thyroid mass.      Trachea: Trachea normal.   Cardiovascular:      Rate and Rhythm: Normal rate and regular rhythm.      Heart sounds: Normal heart sounds. No murmur heard.     No gallop.   Pulmonary:      Effort: Pulmonary effort is normal.      Breath sounds: Normal air entry. Decreased air movement present. Decreased breath sounds present. No wheezing, rhonchi or rales.   Musculoskeletal:         General: No tenderness or deformity. Normal range of motion.      Cervical back: Full passive range of motion without pain, normal range of motion and neck supple.      Thoracic back: Normal.      Right lower leg: No edema.      Left lower leg: No edema.   Skin:     General: Skin is warm and dry.      Coloration: Skin is not jaundiced.      Findings: No rash.   Neurological:      Mental Status: She is alert and oriented to person, place, and time.      Sensory: Sensation is intact.      Motor: Motor function is intact.      Coordination: Coordination is intact.      Gait: Gait is intact.      Deep Tendon Reflexes: Reflexes are normal and symmetric.   Psychiatric:         Mood and Affect: Mood and affect normal.         Behavior: Behavior normal.         Judgment: Judgment normal.       Physical Exam    Diabetic foot exam:   Left: Filament test present   Pulses Dorsalis Pedis:  present   Reflexes 2+    Vibratory sensation normal   Proprioception normal   Sharp/dull discrimination normal       Right: Filament test present, diminished on top of right foot.    Pulses Dorsalis Pedis:  present   Reflexes 2+    Vibratory sensation normal   Proprioception normal   Sharp/dull discrimination normal       BMI is within normal  parameters. No other follow-up for BMI required.    Procedures    Results  Laboratory Studies  Hemoglobin A1c was 6.7.        Assessment/Plan:     Diagnoses and all orders for this visit:    1. Type 2 diabetes mellitus with hyperglycemia, without long-term current use of insulin (Primary)  -     CBC Auto Differential; Future  -     Comprehensive Metabolic Panel; Future  -     Hemoglobin A1c; Future  -     Lipid Panel; Future  -     MicroAlbumin, Urine, Random - Urine, Clean Catch; Future  -     Urinalysis With Culture If Indicated -; Future  -     TSH; Future  -     T3, Free; Future  -     T4, Free; Future  -     Vitamin B12; Future  -     US Ankle / Brachial Indices Extremity Complete; Future    2. Pulmonary emphysema, unspecified emphysema type    3. Suspected sleep apnea  -     Cancel: Overnight Sleep Oximetry Study; Future  -     Overnight Sleep Oximetry Study; Future    4. Essential hypertension    5. Hypercholesterolemia    6. Nerve pain  -     Vitamin B12; Future    7. Neuropathy  -     HYDROcodone-acetaminophen (NORCO)  MG per tablet; Take 1 tablet by mouth Every 8 (Eight) Hours As Needed for Moderate Pain.  Dispense: 90 tablet; Refill: 0  -     gabapentin (NEURONTIN) 600 MG tablet; Take 1 tablet by mouth 3 (Three) Times a Day.  Dispense: 90 tablet; Refill: 0  -     Vitamin B12; Future  -     US Ankle / Brachial Indices Extremity Complete; Future    8. Drop foot gait    9. Hx of lumbosacral spine surgery  Comments:  Right L4-5 microdiscectomy for a herniated disc on 5/24/2017 Dr. IRMA Rosa, .    10. Insomnia, unspecified type    11. Vitamin D deficiency  -     Vitamin D 25 hydroxy; Future    12. Hypothyroidism, unspecified type  -     CBC Auto Differential; Future  -     Comprehensive Metabolic Panel; Future  -     Hemoglobin A1c; Future  -     Lipid Panel; Future  -     MicroAlbumin, Urine, Random - Urine, Clean Catch; Future  -     Urinalysis With Culture If Indicated -; Future  -     TSH;  Future  -     T3, Free; Future  -     T4, Free; Future  -     Vitamin D 25 hydroxy; Future  -     Vitamin B12; Future    13. Other fatigue  -     Vitamin D 25 hydroxy; Future  -     Vitamin B12; Future  -     Vitamin B6; Future    14. Medication monitoring encounter  -     Urine Drug Screen - Urine, Clean Catch; Future    15. Encounter for hepatitis C screening test for low risk patient  -     Hepatitis C antibody; Future          An After Visit Summary was printed and given to the patient at discharge.  Return in about 4 weeks (around 9/4/2024).       Assessment & Plan        Follow-up  A follow-up visit is scheduled in 1 month.    I spent 58 minutes caring for Dayami on this date of service. This time includes time spent by me in the following activities: preparing for the visit, reviewing tests, performing a medically appropriate examination and/or evaluation, counseling and educating the patient/family/caregiver, documenting information in the medical record, independently interpreting results and communicating that information with the patient/family/caregiver, care coordination, ordering medications, ordering test(s), obtaining a separately obtained history, and reviewing a separately obtained history     : I have been treating this patient over a continuum addressing both chronic and acute care concerns.     Claudia BORGES 8/9/2024   Electronically signed.    Patient or patient representative verbalized consent for the use of Ambient Listening during the visit with  JONY Kaur for chart documentation. 8/9/2024  20:49 CDT

## 2024-08-09 PROBLEM — E78.00 HYPERCHOLESTEROLEMIA: Status: ACTIVE | Noted: 2024-08-09

## 2024-08-09 PROBLEM — R29.818 SUSPECTED SLEEP APNEA: Status: ACTIVE | Noted: 2024-08-09

## 2024-08-09 PROBLEM — M21.379 DROP FOOT GAIT: Status: ACTIVE | Noted: 2024-08-09

## 2024-08-09 PROBLEM — G47.00 INSOMNIA: Status: ACTIVE | Noted: 2024-08-09

## 2024-08-09 PROBLEM — G62.9 NEUROPATHY: Status: ACTIVE | Noted: 2024-08-09

## 2024-08-09 PROBLEM — J43.9 PULMONARY EMPHYSEMA: Status: ACTIVE | Noted: 2024-08-09

## 2024-08-09 PROBLEM — E11.65 TYPE 2 DIABETES MELLITUS WITH HYPERGLYCEMIA, WITHOUT LONG-TERM CURRENT USE OF INSULIN: Status: ACTIVE | Noted: 2024-08-09

## 2024-08-09 PROBLEM — E11.51 TYPE 2 DIABETES MELLITUS WITH DIABETIC PERIPHERAL ANGIOPATHY WITHOUT GANGRENE, WITHOUT LONG-TERM CURRENT USE OF INSULIN: Status: ACTIVE | Noted: 2024-08-09

## 2024-08-09 PROBLEM — M79.2 NERVE PAIN: Status: ACTIVE | Noted: 2024-08-09

## 2024-08-09 PROBLEM — E55.9 VITAMIN D DEFICIENCY: Status: ACTIVE | Noted: 2024-08-09

## 2024-08-09 PROBLEM — Z98.890 HX OF LUMBOSACRAL SPINE SURGERY: Status: ACTIVE | Noted: 2024-08-09

## 2024-08-15 ENCOUNTER — HOSPITAL ENCOUNTER (OUTPATIENT)
Dept: ULTRASOUND IMAGING | Facility: HOSPITAL | Age: 69
Discharge: HOME | End: 2024-08-15
Admitting: NURSE PRACTITIONER
Payer: MEDICARE

## 2024-08-15 DIAGNOSIS — E11.65 TYPE 2 DIABETES MELLITUS WITH HYPERGLYCEMIA, WITHOUT LONG-TERM CURRENT USE OF INSULIN: ICD-10-CM

## 2024-08-15 DIAGNOSIS — G62.9 NEUROPATHY: ICD-10-CM

## 2024-08-15 PROCEDURE — 93923 UPR/LXTR ART STDY 3+ LVLS: CPT

## 2024-08-16 ENCOUNTER — TELEPHONE (OUTPATIENT)
Dept: FAMILY MEDICINE CLINIC | Facility: CLINIC | Age: 69
End: 2024-08-16
Payer: MEDICARE

## 2024-08-16 NOTE — TELEPHONE ENCOUNTER
I called Dr Barfield office and they stated that the patient has not completed a bone density test.

## 2024-08-16 NOTE — TELEPHONE ENCOUNTER
----- Message from Claudia Mcconnell sent at 8/9/2024  8:43 PM CDT -----  Can we get a copy of patient's bone density from Dr. Acuna's office.  He has a documented within one of his notes that it was performed in October 2023 but I cannot find this in care everywhere.

## 2024-08-19 DIAGNOSIS — E03.9 HYPOTHYROIDISM, UNSPECIFIED TYPE: ICD-10-CM

## 2024-08-19 DIAGNOSIS — E55.9 VITAMIN D DEFICIENCY: ICD-10-CM

## 2024-08-19 DIAGNOSIS — G62.9 NEUROPATHY: ICD-10-CM

## 2024-08-19 DIAGNOSIS — E11.65 TYPE 2 DIABETES MELLITUS WITH HYPERGLYCEMIA, WITHOUT LONG-TERM CURRENT USE OF INSULIN: ICD-10-CM

## 2024-08-19 DIAGNOSIS — Z51.81 MEDICATION MONITORING ENCOUNTER: ICD-10-CM

## 2024-08-19 DIAGNOSIS — R53.83 OTHER FATIGUE: ICD-10-CM

## 2024-08-19 DIAGNOSIS — M79.2 NERVE PAIN: ICD-10-CM

## 2024-08-19 DIAGNOSIS — Z11.59 ENCOUNTER FOR HEPATITIS C SCREENING TEST FOR LOW RISK PATIENT: ICD-10-CM

## 2024-08-23 ENCOUNTER — TELEPHONE (OUTPATIENT)
Dept: VASCULAR SURGERY | Facility: CLINIC | Age: 69
End: 2024-08-23
Payer: MEDICARE

## 2024-08-23 NOTE — TELEPHONE ENCOUNTER
Call placed to patient with no answer, voicemail left reminding patient of appointment on 8/26/2024 and requested call back

## 2024-08-24 LAB
25(OH)D3+25(OH)D2 SERPL-MCNC: 56.7 NG/ML (ref 30–100)
ALBUMIN SERPL-MCNC: 4.6 G/DL (ref 3.8–4.8)
ALP SERPL-CCNC: 109 IU/L (ref 44–121)
ALT SERPL-CCNC: 9 IU/L (ref 0–32)
APPEARANCE UR: CLEAR
AST SERPL-CCNC: 17 IU/L (ref 0–40)
BASOPHILS # BLD AUTO: 0 X10E3/UL (ref 0–0.2)
BASOPHILS NFR BLD AUTO: 0 %
BILIRUB SERPL-MCNC: <0.2 MG/DL (ref 0–1.2)
BILIRUB UR QL STRIP: NEGATIVE
BUN SERPL-MCNC: 19 MG/DL (ref 8–27)
BUN/CREAT SERPL: 20 (ref 12–28)
CALCIUM SERPL-MCNC: 9.3 MG/DL (ref 8.7–10.3)
CHLORIDE SERPL-SCNC: 99 MMOL/L (ref 96–106)
CHOLEST SERPL-MCNC: 128 MG/DL (ref 100–199)
CO2 SERPL-SCNC: 21 MMOL/L (ref 20–29)
COLOR UR: YELLOW
CREAT SERPL-MCNC: 0.96 MG/DL (ref 0.57–1)
DRUGS UR: NORMAL
EGFRCR SERPLBLD CKD-EPI 2021: 63 ML/MIN/1.73
EOSINOPHIL # BLD AUTO: 0.2 X10E3/UL (ref 0–0.4)
EOSINOPHIL NFR BLD AUTO: 2 %
ERYTHROCYTE [DISTWIDTH] IN BLOOD BY AUTOMATED COUNT: 11.3 % (ref 11.7–15.4)
GLOBULIN SER CALC-MCNC: 2.5 G/DL (ref 1.5–4.5)
GLUCOSE SERPL-MCNC: 139 MG/DL (ref 70–99)
GLUCOSE UR QL STRIP: ABNORMAL
HBA1C MFR BLD: 7 % (ref 4.8–5.6)
HCT VFR BLD AUTO: 42.1 % (ref 34–46.6)
HCV IGG SERPL QL IA: NON REACTIVE
HDLC SERPL-MCNC: 52 MG/DL
HGB BLD-MCNC: 13.9 G/DL (ref 11.1–15.9)
HGB UR QL STRIP: NEGATIVE
IMM GRANULOCYTES # BLD AUTO: 0.1 X10E3/UL (ref 0–0.1)
IMM GRANULOCYTES NFR BLD AUTO: 1 %
KETONES UR QL STRIP: NEGATIVE
LDLC SERPL CALC-MCNC: 59 MG/DL (ref 0–99)
LEUKOCYTE ESTERASE UR QL STRIP: NEGATIVE
LYMPHOCYTES # BLD AUTO: 2.6 X10E3/UL (ref 0.7–3.1)
LYMPHOCYTES NFR BLD AUTO: 22 %
MCH RBC QN AUTO: 31.8 PG (ref 26.6–33)
MCHC RBC AUTO-ENTMCNC: 33 G/DL (ref 31.5–35.7)
MCV RBC AUTO: 96 FL (ref 79–97)
MICRO URNS: ABNORMAL
MICROALBUMIN UR-MCNC: 4 UG/ML
MONOCYTES # BLD AUTO: 0.7 X10E3/UL (ref 0.1–0.9)
MONOCYTES NFR BLD AUTO: 6 %
NEUTROPHILS # BLD AUTO: 8.3 X10E3/UL (ref 1.4–7)
NEUTROPHILS NFR BLD AUTO: 69 %
NITRITE UR QL STRIP: NEGATIVE
NTI URINE TUBE (GRAY): NORMAL
PH UR STRIP: 5.5 [PH] (ref 5–7.5)
PLATELET # BLD AUTO: 418 X10E3/UL (ref 150–450)
POTASSIUM SERPL-SCNC: 4.4 MMOL/L (ref 3.5–5.2)
PROT SERPL-MCNC: 7.1 G/DL (ref 6–8.5)
PROT UR QL STRIP: NEGATIVE
PYRIDOXAL PHOS SERPL-MCNC: 6.5 UG/L (ref 3.4–65.2)
RBC # BLD AUTO: 4.37 X10E6/UL (ref 3.77–5.28)
SODIUM SERPL-SCNC: 138 MMOL/L (ref 134–144)
SP GR UR STRIP: 1.02 (ref 1–1.03)
T3 SERPL-MCNC: 100 NG/DL (ref 71–180)
T4 SERPL-MCNC: 9.4 UG/DL (ref 4.5–12)
TRIGL SERPL-MCNC: 90 MG/DL (ref 0–149)
TSH SERPL DL<=0.005 MIU/L-ACNC: 0.55 UIU/ML (ref 0.45–4.5)
UROBILINOGEN UR STRIP-MCNC: 0.2 MG/DL (ref 0.2–1)
VIT B12 SERPL-MCNC: 445 PG/ML (ref 232–1245)
VLDLC SERPL CALC-MCNC: 17 MG/DL (ref 5–40)
WBC # BLD AUTO: 12 X10E3/UL (ref 3.4–10.8)

## 2024-08-26 ENCOUNTER — OFFICE VISIT (OUTPATIENT)
Dept: VASCULAR SURGERY | Facility: CLINIC | Age: 69
End: 2024-08-26
Payer: MEDICARE

## 2024-08-26 ENCOUNTER — PATIENT ROUNDING (BHMG ONLY) (OUTPATIENT)
Dept: VASCULAR SURGERY | Facility: CLINIC | Age: 69
End: 2024-08-26
Payer: MEDICARE

## 2024-08-26 VITALS
WEIGHT: 105 LBS | SYSTOLIC BLOOD PRESSURE: 118 MMHG | OXYGEN SATURATION: 96 % | HEIGHT: 59 IN | HEART RATE: 106 BPM | DIASTOLIC BLOOD PRESSURE: 72 MMHG | BODY MASS INDEX: 21.17 KG/M2

## 2024-08-26 DIAGNOSIS — E78.00 HYPERCHOLESTEROLEMIA: ICD-10-CM

## 2024-08-26 DIAGNOSIS — I65.23 BILATERAL CAROTID ARTERY STENOSIS: ICD-10-CM

## 2024-08-26 DIAGNOSIS — I10 ESSENTIAL HYPERTENSION: ICD-10-CM

## 2024-08-26 DIAGNOSIS — F17.200 CURRENT EVERY DAY SMOKER: ICD-10-CM

## 2024-08-26 DIAGNOSIS — I73.9 PAD (PERIPHERAL ARTERY DISEASE): Primary | ICD-10-CM

## 2024-08-26 PROCEDURE — 1159F MED LIST DOCD IN RCRD: CPT | Performed by: NURSE PRACTITIONER

## 2024-08-26 PROCEDURE — 3078F DIAST BP <80 MM HG: CPT | Performed by: NURSE PRACTITIONER

## 2024-08-26 PROCEDURE — 99214 OFFICE O/P EST MOD 30 MIN: CPT | Performed by: NURSE PRACTITIONER

## 2024-08-26 PROCEDURE — 3074F SYST BP LT 130 MM HG: CPT | Performed by: NURSE PRACTITIONER

## 2024-08-26 PROCEDURE — 1160F RVW MEDS BY RX/DR IN RCRD: CPT | Performed by: NURSE PRACTITIONER

## 2024-08-26 NOTE — PROGRESS NOTES
August 26, 2024    Hello, may I speak with Dayami Enid?    My name is Fredy      I am  with [facility]  [address], [address]  [place] [address] [phone].    Before we get started may I verify your date of birth? 1955    I am calling to officially welcome you to our practice and ask about your recent visit. Is this a good time to talk? yes    Tell me about your visit with us. What things went well?  it went very well       We're always looking for ways to make our patients' experiences even better. Do you have recommendations on ways we may improve?  no    Overall were you satisfied with your first visit to our practice? yes       I appreciate you taking the time to speak with me today. Is there anything else I can do for you? no      Thank you, and have a great day.

## 2024-08-26 NOTE — PROGRESS NOTES
08/26/2024      Claudia Mcconnell, APRN  [address]  [place] [address]    Dayami Rossi  1955    Chief Complaint   Patient presents with    abnormal DANA study     Testing under imaging, left leg posterior aspect, was told she has bursitis       Dear Claudia Kaplan*:      HPI  I had the pleasure of seeing your patient Dayami Rossi in the office today.  Thank you kindly for this consultation.  As you recall, Dayami Rossi is a 69 y.o.  female who you are currently following for routine health maintenance.  She does have chronic back pain with history of previous surgery with Dr. Rosa.  She does continue to struggle with low back pain neuropathy and right foot drop.  She is diabetic as well, with most recent hemoglobin A1c of 7%.  She is a current daily smoker.  She states she is having pain down the lateral posterior aspect of her left leg from her hip and has been told she has bursitis.  She does take Zocor daily.  She did have noninvasive testing performed which I did review in office.    Past Medical History:   Diagnosis Date    Arthritis     Arthritis     Body mass index (BMI) of 24.0-24.9 in adult     Depression     Diabetes mellitus     Disease of thyroid gland     H/O mammogram     bilateral - [facility]    History of radiation therapy 02/11/2016    6040 cGy to right breast    Hypertension     Malignant neoplasm of upper-inner quadrant of right female breast 9/18/2016       Past Surgical History:   Procedure Laterality Date    BACK SURGERY      bulging disc repair July 2017    BREAST SURGERY Right 08/20/2015     lumpectomy    BREAST SURGERY Right 09/03/2015    mastectomy    MASTECTOMY Right     modified radical, Castañeda    THYROID SURGERY      TUBAL ABDOMINAL LIGATION         Family History   Problem Relation Age of Onset    Cancer Mother         bladder cancer    Stroke Mother     Hypertension Mother     Thyroid disease Mother     Prostate cancer Father      Thyroid disease Sister     Lung cancer Brother     No Known Problems Daughter     No Known Problems Son     Heart disease Brother         heart attack    No Known Problems Brother     Thyroid disease Sister     No Known Problems Maternal Grandmother     No Known Problems Maternal Grandfather     No Known Problems Paternal Grandmother     Prostate cancer Paternal Grandfather     No Known Problems Brother        Social History     Socioeconomic History    Marital status:    Tobacco Use    Smoking status: Every Day     Current packs/day: 0.50     Average packs/day: 0.5 packs/day for 45.0 years (22.5 ttl pk-yrs)     Types: Cigarettes    Smokeless tobacco: Never    Tobacco comments:     Thinking of ways to quit   Vaping Use    Vaping status: Never Used   Substance and Sexual Activity    Alcohol use: No    Drug use: No    Sexual activity: Defer       Allergies   Allergen Reactions    Erythromycin Base Rash    Penicillins Rash       Current Outpatient Medications   Medication Instructions    albuterol (PROVENTIL HFA;VENTOLIN HFA) 108 (90 Base) MCG/ACT inhaler 2 puffs, Inhalation, Every 4 Hours PRN    ALPRAZolam (XANAX) 1 mg, Oral, 2 Times Daily PRN    amLODIPine (NORVASC) 5 mg, Oral, Daily    Calcium Citrate-Vitamin D (CALCIUM + D PO) 1 tablet, Oral, Daily    DULoxetine HCl (DRIZALMA) 30 MG capsule 1 capsule, Oral, Daily    empagliflozin (JARDIANCE) 10 mg, Oral    gabapentin (NEURONTIN) 600 mg, Oral, 3 Times Daily    glipizide (GLUCOTROL) 10 mg, Oral, 2 Times Daily Before Meals    HYDROcodone-acetaminophen (NORCO)  MG per tablet 1 tablet, Oral, Every 8 Hours PRN    letrozole (FEMARA) 2.5 mg, Oral, Daily    levothyroxine (SYNTHROID, LEVOTHROID) 100 mcg, Oral, Daily    lisinopril (PRINIVIL,ZESTRIL) 20 MG tablet 1 tablet, Oral, Daily    multivitamin with minerals 1 tablet/day, Oral    potassium chloride (K-DUR) 10 MEQ CR tablet 10 mEq, Oral, Daily    simvastatin (ZOCOR) 10 mg, Oral, Nightly    traZODone (DESYREL)  "100 MG tablet TAKE 1-3 TAB BY MOUTH EVERY DAY AT BEDTIME AS NEEDED FOR SLEEP         Review of Systems   Constitutional: Negative.    HENT: Negative.     Eyes: Negative.    Respiratory: Negative.     Cardiovascular: Negative.    Gastrointestinal: Negative.    Endocrine: Negative.    Genitourinary: Negative.    Musculoskeletal:  Positive for arthralgias.        Left hip pain   Skin: Negative.    Allergic/Immunologic: Negative.    Neurological: Negative.         Right foot drop   Hematological: Negative.    Psychiatric/Behavioral: Negative.     All other systems reviewed and are negative.      /72   Pulse 106   Ht 149.9 cm (59\")   Wt 47.6 kg (105 lb)   LMP  (LMP Unknown)   SpO2 96%   BMI 21.21 kg/m²   Physical Exam  Vitals and nursing note reviewed.   Constitutional:       General: She is not in acute distress.     Appearance: Normal appearance. She is well-developed and normal weight. She is not diaphoretic.   HENT:      Head: Normocephalic and atraumatic.   Eyes:      General: No scleral icterus.     Pupils: Pupils are equal, round, and reactive to light.   Neck:      Thyroid: No thyromegaly.      Vascular: No carotid bruit or JVD.   Cardiovascular:      Rate and Rhythm: Normal rate and regular rhythm.      Pulses: Normal pulses.           Femoral pulses are 2+ on the right side and 2+ on the left side.       Dorsalis pedis pulses are 2+ on the right side and 2+ on the left side.        Posterior tibial pulses are 2+ on the right side and 2+ on the left side.      Heart sounds: Normal heart sounds and S2 normal. No murmur heard.     No friction rub. No gallop.   Pulmonary:      Effort: Pulmonary effort is normal.      Breath sounds: Normal breath sounds.   Abdominal:      General: Bowel sounds are normal.      Palpations: Abdomen is soft.   Musculoskeletal:         General: Normal range of motion.      Cervical back: Normal range of motion and neck supple.   Skin:     General: Skin is warm and dry. "   Neurological:      Mental Status: She is alert and oriented to person, place, and time.      Cranial Nerves: No cranial nerve deficit.      Gait: Gait abnormal (right foot drop).   Psychiatric:         Mood and Affect: Mood normal.         Behavior: Behavior normal.         Thought Content: Thought content normal.         Judgment: Judgment normal.       Diagnostic Data:  US Ankle / Brachial Indices Extremity Complete    Result Date: 8/15/2024  Narrative: EXAM: US ANKLE / BRACHIAL INDICES EXTREMITY COMPLETE- - 8/15/2024 11:38 AM  HISTORY: neuropathy, DM 2.; G62.9-Polyneuropathy, unspecified; E11.65-Type 2 diabetes mellitus with hyperglycemia   COMPARISON: No existing relevant imaging studies available  TECHNIQUE: Routine lower extremity arterial segmental/brachial index performed.  FINDINGS:  RIGHT Segmental BP Brachial: Not reported due to history of right breast cancer. Posterior tibial: 117, index 1.18. Biphasic. Dorsalis pedis: 129, index 1.30. Biphasic.  Digit: 71, 0.72. Normal. Ankle-brachial index 3 minutes post exercise: 1.03, abnormal.  LEFT Segmental BP Brachial: 99 Posterior tibial: 125, index 1.26. Biphasic. Dorsalis pedis: 139, index 1.40. Biphasic.  Digit: 77, 0.78. Normal. Ankle-brachial index 3 minutes post exercise: 1.08, abnormal.       Impression: 1. RIGHT DANA 1.30. Normal DANA. 2. LEFT DANA 1.40. Borderline DANA. 3. Abnormal change in DANA post exercise.  This report was signed and finalized on 8/15/2024 2:37 PM by Dr Jenna eMdina MD.       Patient Active Problem List   Diagnosis    Malignant neoplasm of upper-inner quadrant of right female breast    Hypothyroidism    Essential hypertension    Hx of radiation therapy    S/P right mastectomy    Prophylactic use of letrozole (Femara)    Current every day smoker    Aromatase inhibitor-associated arthralgia    Regional lymph node metastasis present    Type 2 diabetes mellitus with diabetic peripheral angiopathy without gangrene, without long-term  current use of insulin    Hypercholesterolemia    Neuropathy    Drop foot gait    Nerve pain    Hx of lumbosacral spine surgery    Insomnia    Vitamin D deficiency    Pulmonary emphysema    Suspected sleep apnea    Type 2 diabetes mellitus with hyperglycemia, without long-term current use of insulin         ICD-10-CM ICD-9-CM   1. PAD (peripheral artery disease)  I73.9 443.9   2. Bilateral carotid artery stenosis  I65.23 433.10     433.30   3. Essential hypertension  I10 401.9   4. Hypercholesterolemia  E78.00 272.0   5. Current every day smoker  F17.200 305.1           Plan: After thoroughly evaluating Dayami Rossi, I believe the best course of action is to remain conservative from a vascular surgery standpoint.. Overall, she denies any claudication.  She is having pain in her left hip.  Previous CT of the abdomen/pelvis did show aorto iliac disease.  Upon exam she has palpable pulses and bounding femoral pulses.  I did review her testing which overall looks normal.  Her pain sounds related to neurogenic claudication.  She has also been diagnosed with bursitis.  I will follow-up with her in 6 months and repeat ABIs as well as a carotid duplex.  I did discuss vascular risk factors as they pertain to the progression of vascular disease including controlling her hypertension, hypercholesterolemia, and smoking cessation.  Her blood pressure is stable on her current medications.  I will have her begin taking a baby aspirin enteric-coated in addition to her Zocor 10 mg daily as well as her other medications.  The patient can continue taking their current medication regimen as previously planned.  This was all discussed in full with complete understanding.    Thank you for allowing me to participate in the care of your patient.  Please do not hesitate with any questions or concerns.  I will keep you aware of any further encounters with Dayami Rossi.        Sincerely yours,         JONY Hussein

## 2024-08-26 NOTE — LETTER
August 26, 2024     JONY Kaur  [address] [facility] [address]  [place] [address]    Patient: Dayami Rossi   YOB: 1955   Date of Visit: 8/26/2024     Dear JONY Kaur:       Thank you for referring Dayami Rossi to me for evaluation. Below are the relevant portions of my assessment and plan of care.    If you have questions, please do not hesitate to call me. I look forward to following Dayami along with you.         Sincerely,        JONY Hussein        CC: No Recipients    Chari Aponte APRN  08/26/24 1630  Sign when Signing Visit  08/26/2024      Claudia Mcconnell APRN  [address] [facility] [address]  [place] [address]    Dayami Rossi  1955    Chief Complaint   Patient presents with   • abnormal DANA study     Testing under imaging, left leg posterior aspect, was told she has bursitis       Dear Claudia Kaplan*:      HPI  I had the pleasure of seeing your patient Dayami Rossi in the office today.  Thank you kindly for this consultation.  As you recall, Dayami Rossi is a 69 y.o.  female who you are currently following for routine health maintenance.  She does have chronic back pain with history of previous surgery with Dr. Rosa.  She does continue to struggle with low back pain neuropathy and right foot drop.  She is diabetic as well, with most recent hemoglobin A1c of 7%.  She is a current daily smoker.  She states she is having pain down the lateral posterior aspect of her left leg from her hip and has been told she has bursitis.  She does take Zocor daily.  She did have noninvasive testing performed which I did review in office.    Past Medical History:   Diagnosis Date   • Arthritis    • Arthritis    • Body mass index (BMI) of 24.0-24.9 in adult    • Depression    • Diabetes mellitus    • Disease of thyroid gland    • H/O mammogram     bilateral - [facility]   • History of radiation therapy  02/11/2016    6040 cGy to right breast   • Hypertension    • Malignant neoplasm of upper-inner quadrant of right female breast 9/18/2016       Past Surgical History:   Procedure Laterality Date   • BACK SURGERY      bulging disc repair July 2017   • BREAST SURGERY Right 08/20/2015     lumpectomy   • BREAST SURGERY Right 09/03/2015    mastectomy   • MASTECTOMY Right     modified radicalGarry   • THYROID SURGERY     • TUBAL ABDOMINAL LIGATION         Family History   Problem Relation Age of Onset   • Cancer Mother         bladder cancer   • Stroke Mother    • Hypertension Mother    • Thyroid disease Mother    • Prostate cancer Father    • Thyroid disease Sister    • Lung cancer Brother    • No Known Problems Daughter    • No Known Problems Son    • Heart disease Brother         heart attack   • No Known Problems Brother    • Thyroid disease Sister    • No Known Problems Maternal Grandmother    • No Known Problems Maternal Grandfather    • No Known Problems Paternal Grandmother    • Prostate cancer Paternal Grandfather    • No Known Problems Brother        Social History     Socioeconomic History   • Marital status:    Tobacco Use   • Smoking status: Every Day     Current packs/day: 0.50     Average packs/day: 0.5 packs/day for 45.0 years (22.5 ttl pk-yrs)     Types: Cigarettes   • Smokeless tobacco: Never   • Tobacco comments:     Thinking of ways to quit   Vaping Use   • Vaping status: Never Used   Substance and Sexual Activity   • Alcohol use: No   • Drug use: No   • Sexual activity: Defer       Allergies   Allergen Reactions   • Erythromycin Base Rash   • Penicillins Rash       Current Outpatient Medications   Medication Instructions   • albuterol (PROVENTIL HFA;VENTOLIN HFA) 108 (90 Base) MCG/ACT inhaler 2 puffs, Inhalation, Every 4 Hours PRN   • ALPRAZolam (XANAX) 1 mg, Oral, 2 Times Daily PRN   • amLODIPine (NORVASC) 5 mg, Oral, Daily   • Calcium Citrate-Vitamin D (CALCIUM + D PO) 1 tablet, Oral, Daily  "  • DULoxetine HCl (DRIZALMA) 30 MG capsule 1 capsule, Oral, Daily   • empagliflozin (JARDIANCE) 10 mg, Oral   • gabapentin (NEURONTIN) 600 mg, Oral, 3 Times Daily   • glipizide (GLUCOTROL) 10 mg, Oral, 2 Times Daily Before Meals   • HYDROcodone-acetaminophen (NORCO)  MG per tablet 1 tablet, Oral, Every 8 Hours PRN   • letrozole (FEMARA) 2.5 mg, Oral, Daily   • levothyroxine (SYNTHROID, LEVOTHROID) 100 mcg, Oral, Daily   • lisinopril (PRINIVIL,ZESTRIL) 20 MG tablet 1 tablet, Oral, Daily   • multivitamin with minerals 1 tablet/day, Oral   • potassium chloride (K-DUR) 10 MEQ CR tablet 10 mEq, Oral, Daily   • simvastatin (ZOCOR) 10 mg, Oral, Nightly   • traZODone (DESYREL) 100 MG tablet TAKE 1-3 TAB BY MOUTH EVERY DAY AT BEDTIME AS NEEDED FOR SLEEP         Review of Systems   Constitutional: Negative.    HENT: Negative.     Eyes: Negative.    Respiratory: Negative.     Cardiovascular: Negative.    Gastrointestinal: Negative.    Endocrine: Negative.    Genitourinary: Negative.    Musculoskeletal:  Positive for arthralgias.        Left hip pain   Skin: Negative.    Allergic/Immunologic: Negative.    Neurological: Negative.         Right foot drop   Hematological: Negative.    Psychiatric/Behavioral: Negative.     All other systems reviewed and are negative.      /72   Pulse 106   Ht 149.9 cm (59\")   Wt 47.6 kg (105 lb)   LMP  (LMP Unknown)   SpO2 96%   BMI 21.21 kg/m²   Physical Exam  Vitals and nursing note reviewed.   Constitutional:       General: She is not in acute distress.     Appearance: Normal appearance. She is well-developed and normal weight. She is not diaphoretic.   HENT:      Head: Normocephalic and atraumatic.   Eyes:      General: No scleral icterus.     Pupils: Pupils are equal, round, and reactive to light.   Neck:      Thyroid: No thyromegaly.      Vascular: No carotid bruit or JVD.   Cardiovascular:      Rate and Rhythm: Normal rate and regular rhythm.      Pulses: Normal pulses.    "        Femoral pulses are 2+ on the right side and 2+ on the left side.       Dorsalis pedis pulses are 2+ on the right side and 2+ on the left side.        Posterior tibial pulses are 2+ on the right side and 2+ on the left side.      Heart sounds: Normal heart sounds and S2 normal. No murmur heard.     No friction rub. No gallop.   Pulmonary:      Effort: Pulmonary effort is normal.      Breath sounds: Normal breath sounds.   Abdominal:      General: Bowel sounds are normal.      Palpations: Abdomen is soft.   Musculoskeletal:         General: Normal range of motion.      Cervical back: Normal range of motion and neck supple.   Skin:     General: Skin is warm and dry.   Neurological:      Mental Status: She is alert and oriented to person, place, and time.      Cranial Nerves: No cranial nerve deficit.      Gait: Gait abnormal (right foot drop).   Psychiatric:         Mood and Affect: Mood normal.         Behavior: Behavior normal.         Thought Content: Thought content normal.         Judgment: Judgment normal.       Diagnostic Data:  US Ankle / Brachial Indices Extremity Complete    Result Date: 8/15/2024  Narrative: EXAM: US ANKLE / BRACHIAL INDICES EXTREMITY COMPLETE- - 8/15/2024 11:38 AM  HISTORY: neuropathy, DM 2.; G62.9-Polyneuropathy, unspecified; E11.65-Type 2 diabetes mellitus with hyperglycemia   COMPARISON: No existing relevant imaging studies available  TECHNIQUE: Routine lower extremity arterial segmental/brachial index performed.  FINDINGS:  RIGHT Segmental BP Brachial: Not reported due to history of right breast cancer. Posterior tibial: 117, index 1.18. Biphasic. Dorsalis pedis: 129, index 1.30. Biphasic.  Digit: 71, 0.72. Normal. Ankle-brachial index 3 minutes post exercise: 1.03, abnormal.  LEFT Segmental BP Brachial: 99 Posterior tibial: 125, index 1.26. Biphasic. Dorsalis pedis: 139, index 1.40. Biphasic.  Digit: 77, 0.78. Normal. Ankle-brachial index 3 minutes post exercise: 1.08, abnormal.        Impression: 1. RIGHT DANA 1.30. Normal DANA. 2. LEFT DANA 1.40. Borderline DANA. 3. Abnormal change in DANA post exercise.  This report was signed and finalized on 8/15/2024 2:37 PM by Dr Jenna Medina MD.       Patient Active Problem List   Diagnosis   • Malignant neoplasm of upper-inner quadrant of right female breast   • Hypothyroidism   • Essential hypertension   • Hx of radiation therapy   • S/P right mastectomy   • Prophylactic use of letrozole (Femara)   • Current every day smoker   • Aromatase inhibitor-associated arthralgia   • Regional lymph node metastasis present   • Type 2 diabetes mellitus with diabetic peripheral angiopathy without gangrene, without long-term current use of insulin   • Hypercholesterolemia   • Neuropathy   • Drop foot gait   • Nerve pain   • Hx of lumbosacral spine surgery   • Insomnia   • Vitamin D deficiency   • Pulmonary emphysema   • Suspected sleep apnea   • Type 2 diabetes mellitus with hyperglycemia, without long-term current use of insulin         ICD-10-CM ICD-9-CM   1. PAD (peripheral artery disease)  I73.9 443.9   2. Bilateral carotid artery stenosis  I65.23 433.10     433.30   3. Essential hypertension  I10 401.9   4. Hypercholesterolemia  E78.00 272.0   5. Current every day smoker  F17.200 305.1           Plan: After thoroughly evaluating Dayami Rossi, I believe the best course of action is to remain conservative from a vascular surgery standpoint.. Overall, she denies any claudication.  She is having pain in her left hip.  Previous CT of the abdomen/pelvis did show aorto iliac disease.  Upon exam she has palpable pulses and bounding femoral pulses.  I did review her testing which overall looks normal.  Her pain sounds related to neurogenic claudication.  She has also been diagnosed with bursitis.  I will follow-up with her in 6 months and repeat ABIs as well as a carotid duplex.  I did discuss vascular risk factors as they pertain to the progression of vascular  disease including controlling her hypertension, hypercholesterolemia, and smoking cessation.  Her blood pressure is stable on her current medications.  I will have her begin taking a baby aspirin enteric-coated in addition to her Zocor 10 mg daily as well as her other medications.  The patient can continue taking their current medication regimen as previously planned.  This was all discussed in full with complete understanding.    Thank you for allowing me to participate in the care of your patient.  Please do not hesitate with any questions or concerns.  I will keep you aware of any further encounters with Dayami Rossi.        Sincerely yours,         JONY Hussein

## 2024-08-27 ENCOUNTER — PATIENT ROUNDING (BHMG ONLY) (OUTPATIENT)
Dept: VASCULAR SURGERY | Facility: CLINIC | Age: 69
End: 2024-08-27
Payer: MEDICARE

## 2024-08-27 NOTE — PROGRESS NOTES
August 27, 2024    Hello, may I speak with Dayami Rossi?    My name is Tiffanie Khan CMA    I am  with [facility]  [address], [address]  [place] [address] [phone].    Before we get started may I verify your date of birth? 1955    I am calling to officially welcome you to our practice and ask about your recent visit. Is this a good time to talk?     Tell me about your visit with us. What things went well?         We're always looking for ways to make our patients' experiences even better. Do you have recommendations on ways we may improve?      Overall were you satisfied with your first visit to our practice?        I appreciate you taking the time to speak with me today. Is there anything else I can do for you?       Thank you, and have a great day.      Patient did not answer left VM

## 2024-09-05 ENCOUNTER — OFFICE VISIT (OUTPATIENT)
Dept: FAMILY MEDICINE CLINIC | Facility: CLINIC | Age: 69
End: 2024-09-05
Payer: MEDICARE

## 2024-09-05 VITALS
WEIGHT: 105.13 LBS | HEIGHT: 59 IN | HEART RATE: 58 BPM | RESPIRATION RATE: 19 BRPM | SYSTOLIC BLOOD PRESSURE: 125 MMHG | DIASTOLIC BLOOD PRESSURE: 60 MMHG | TEMPERATURE: 97.8 F | BODY MASS INDEX: 21.2 KG/M2 | OXYGEN SATURATION: 98 %

## 2024-09-05 DIAGNOSIS — E03.9 HYPOTHYROIDISM, UNSPECIFIED TYPE: ICD-10-CM

## 2024-09-05 DIAGNOSIS — E53.8 B12 DEFICIENCY: ICD-10-CM

## 2024-09-05 DIAGNOSIS — Z23 PNEUMOCOCCAL VACCINE ADMINISTERED: ICD-10-CM

## 2024-09-05 DIAGNOSIS — R53.83 OTHER FATIGUE: ICD-10-CM

## 2024-09-05 DIAGNOSIS — Z90.11 S/P RIGHT MASTECTOMY: Chronic | ICD-10-CM

## 2024-09-05 DIAGNOSIS — J43.9 PULMONARY EMPHYSEMA, UNSPECIFIED EMPHYSEMA TYPE: ICD-10-CM

## 2024-09-05 DIAGNOSIS — F32.A DEPRESSION, UNSPECIFIED DEPRESSION TYPE: ICD-10-CM

## 2024-09-05 DIAGNOSIS — G62.9 NEUROPATHY: ICD-10-CM

## 2024-09-05 DIAGNOSIS — I10 ESSENTIAL HYPERTENSION: ICD-10-CM

## 2024-09-05 DIAGNOSIS — J44.1 COPD WITH EXACERBATION: ICD-10-CM

## 2024-09-05 DIAGNOSIS — Z00.00 MEDICARE ANNUAL WELLNESS VISIT, SUBSEQUENT: Primary | ICD-10-CM

## 2024-09-05 DIAGNOSIS — E55.9 VITAMIN D DEFICIENCY: ICD-10-CM

## 2024-09-05 DIAGNOSIS — Z13.820 SCREENING FOR OSTEOPOROSIS: ICD-10-CM

## 2024-09-05 DIAGNOSIS — R53.82 CHRONIC FATIGUE: ICD-10-CM

## 2024-09-05 DIAGNOSIS — E11.65 TYPE 2 DIABETES MELLITUS WITH HYPERGLYCEMIA, WITHOUT LONG-TERM CURRENT USE OF INSULIN: ICD-10-CM

## 2024-09-05 DIAGNOSIS — Z78.0 POST-MENOPAUSAL: ICD-10-CM

## 2024-09-05 RX ORDER — METFORMIN HCL 500 MG
500 TABLET, EXTENDED RELEASE 24 HR ORAL
Qty: 30 TABLET | Refills: 2 | Status: SHIPPED | OUTPATIENT
Start: 2024-09-05

## 2024-09-05 RX ORDER — CYANOCOBALAMIN 1000 UG/ML
1000 INJECTION, SOLUTION INTRAMUSCULAR; SUBCUTANEOUS
Status: SHIPPED | OUTPATIENT
Start: 2024-09-05

## 2024-09-05 RX ORDER — METHYLPREDNISOLONE 4 MG
TABLET, DOSE PACK ORAL
Qty: 21 TABLET | Refills: 0 | Status: SHIPPED | OUTPATIENT
Start: 2024-09-05

## 2024-09-05 RX ORDER — SIMVASTATIN 10 MG
10 TABLET ORAL NIGHTLY
Qty: 90 TABLET | Refills: 2 | Status: SHIPPED | OUTPATIENT
Start: 2024-09-05

## 2024-09-05 RX ORDER — LEVOTHYROXINE SODIUM 100 UG/1
100 TABLET ORAL DAILY
Qty: 90 TABLET | Refills: 2 | Status: SHIPPED | OUTPATIENT
Start: 2024-09-05

## 2024-09-05 RX ORDER — HYDROCODONE BITARTRATE AND ACETAMINOPHEN 10; 325 MG/1; MG/1
1 TABLET ORAL EVERY 8 HOURS PRN
Qty: 90 TABLET | Refills: 0 | Status: SHIPPED | OUTPATIENT
Start: 2024-09-05

## 2024-09-05 RX ORDER — CYANOCOBALAMIN 1000 UG/ML
INJECTION, SOLUTION INTRAMUSCULAR; SUBCUTANEOUS
Qty: 10 ML | Refills: 0 | Status: SHIPPED | OUTPATIENT
Start: 2024-09-05

## 2024-09-05 RX ORDER — DESVENLAFAXINE 25 MG/1
25 TABLET, EXTENDED RELEASE ORAL DAILY
Qty: 30 TABLET | Refills: 2 | Status: SHIPPED | OUTPATIENT
Start: 2024-09-05

## 2024-09-05 RX ORDER — GABAPENTIN 600 MG/1
600 TABLET ORAL 3 TIMES DAILY
Qty: 90 TABLET | Refills: 0 | Status: SHIPPED | OUTPATIENT
Start: 2024-09-05

## 2024-09-05 RX ADMIN — CYANOCOBALAMIN 1000 MCG: 1000 INJECTION, SOLUTION INTRAMUSCULAR; SUBCUTANEOUS at 10:51

## 2024-09-05 NOTE — PATIENT INSTRUCTIONS
Begin B complex with vitamin c in the monring.     Stop glipizide.     Metformin xr 500mg daily after breakfast.     After completing steroids. Stop cymbalta. Begin pristiq 25mg daily.     Try breztri twice daily.     Begin steroids for COPD exacerbation.      18-Jan-2024 02:16:36

## 2024-09-05 NOTE — PROGRESS NOTES
The ABCs of the Annual Wellness Visit  Subsequent Medicare Wellness Visit    Subjective    Dayami Rossi is a 69 y.o. female who presents for a Subsequent Medicare Wellness Visit.    The following portions of the patient's history were reviewed and   updated as appropriate: allergies, current medications, past family history, past medical history, past social history, past surgical history, and problem list.    Compared to one year ago, the patient feels her physical   health is the same.    Compared to one year ago, the patient feels her mental   health is the same.    Recent Hospitalizations:  She was not admitted to the hospital during the last year.       Current Medical Providers:  Patient Care Team:  Claudia Mcconnell APRN as PCP - General (Family Medicine)  Provider, No Known as PCP - Family Medicine  Hector Mills III, MD as Consulting Physician (Radiation Oncology)  Tina Vasquez APRN as Nurse Practitioner (Radiation Oncology)  Marva Benitez PA-C as Physician Assistant (Radiation Oncology)    Outpatient Medications Prior to Visit   Medication Sig Dispense Refill    albuterol (PROVENTIL HFA;VENTOLIN HFA) 108 (90 Base) MCG/ACT inhaler Inhale 2 puffs Every 4 (Four) Hours As Needed for Wheezing.      ALPRAZolam (XANAX) 1 MG tablet Take 1 tablet by mouth 2 (Two) Times a Day As Needed for Anxiety. Patient is taking a half a night      amLODIPine (NORVASC) 5 MG tablet Take 1 tablet by mouth Daily. 90 tablet 3    Calcium Citrate-Vitamin D (CALCIUM + D PO) Take 1 tablet by mouth Daily.      empagliflozin (Jardiance) 10 MG tablet tablet Take 1 tablet by mouth.      letrozole (FEMARA) 2.5 MG tablet Take 1 tablet by mouth Daily. 90 tablet 3    lisinopril (PRINIVIL,ZESTRIL) 20 MG tablet Take 1 tablet by mouth Daily.      Multiple Vitamins-Minerals (MULTIVITAMIN ADULT PO) Take 1 tablet/day by mouth.      potassium chloride (K-DUR) 10 MEQ CR tablet Take 1 tablet by mouth Daily.      traZODone  (DESYREL) 100 MG tablet TAKE 1-3 TAB BY MOUTH EVERY DAY AT BEDTIME AS NEEDED FOR SLEEP      DULoxetine HCl (DRIZALMA) 30 MG capsule Take 1 capsule by mouth Daily.      gabapentin (NEURONTIN) 600 MG tablet Take 1 tablet by mouth 3 (Three) Times a Day. 90 tablet 0    glipizide (GLUCOTROL) 10 MG tablet Take 1 tablet by mouth 2 (Two) Times a Day Before Meals.      HYDROcodone-acetaminophen (NORCO)  MG per tablet Take 1 tablet by mouth Every 8 (Eight) Hours As Needed for Moderate Pain. 90 tablet 0    levothyroxine (SYNTHROID, LEVOTHROID) 100 MCG tablet Take 1 tablet by mouth Daily.      simvastatin (ZOCOR) 10 MG tablet Take 1 tablet by mouth Every Night.       No facility-administered medications prior to visit.       Opioid medication/s are on active medication list.  and I have evaluated her active treatment plan and pain score trends (see table).  There were no vitals filed for this visit.  I have reviewed the chart for potential of high risk medication and harmful drug interactions in the elderly.          Aspirin is not on active medication list.  Aspirin use is indicated based on review of current medical condition/s. Pros and cons of this therapy have been discussed with this patient. Benefits of this medication outweigh potential harm.  Patient has been instructed to start taking this medication..    Patient Active Problem List   Diagnosis    Malignant neoplasm of upper-inner quadrant of right female breast    Hypothyroidism    Essential hypertension    Hx of radiation therapy    S/P right mastectomy    Prophylactic use of letrozole (Femara)    Current every day smoker    Aromatase inhibitor-associated arthralgia    Regional lymph node metastasis present    Type 2 diabetes mellitus with diabetic peripheral angiopathy without gangrene, without long-term current use of insulin    Hypercholesterolemia    Neuropathy    Drop foot gait    Nerve pain    Hx of lumbosacral spine surgery    Insomnia    Vitamin D  "deficiency    Pulmonary emphysema    Suspected sleep apnea    Type 2 diabetes mellitus with hyperglycemia, without long-term current use of insulin    Pneumococcal vaccine administered    COPD with exacerbation    Other fatigue    Depression    B12 deficiency    Post-menopausal    Screening for osteoporosis     Advance Care Planning   Advance Care Planning     Advance Directive is not on file.  ACP discussion was held with the patient during this visit. Patient has an advance directive (not in EMR), copy requested.     Objective    Vitals:    24 0952   BP: 125/60   BP Location: Left arm   Patient Position: Sitting   Cuff Size: Adult   Pulse: 58   Resp: 19   Temp: 97.8 °F (36.6 °C)   TempSrc: Infrared   SpO2: 98%   Weight: 47.7 kg (105 lb 2 oz)   Height: 149.9 cm (59.02\")     Estimated body mass index is 21.22 kg/m² as calculated from the following:    Height as of this encounter: 149.9 cm (59.02\").    Weight as of this encounter: 47.7 kg (105 lb 2 oz).    BMI is within normal parameters. No other follow-up for BMI required.      Does the patient have evidence of cognitive impairment? No    Lab Results   Component Value Date    CHLPL 128 2024    TRIG 90 2024    HDL 52 2024    LDL 59 2024    VLDL 17 2024    HGBA1C 7.0 (H) 2024        HEALTH RISK ASSESSMENT    Smoking Status:  Social History     Tobacco Use   Smoking Status Every Day    Current packs/day: 0.50    Average packs/day: 0.5 packs/day for 45.0 years (22.5 ttl pk-yrs)    Types: Cigarettes   Smokeless Tobacco Never   Tobacco Comments    Thinking of ways to quit     Alcohol Consumption:  Social History     Substance and Sexual Activity   Alcohol Use No     Fall Risk Screen:    STEADI Fall Risk Assessment was completed, and patient is at LOW risk for falls.Assessment completed on:2024    Depression Screenin/7/2024    10:41 AM   PHQ-2/PHQ-9 Depression Screening   Little Interest or Pleasure in Doing Things " 0-->not at all   Feeling Down, Depressed or Hopeless 0-->not at all   PHQ-9: Brief Depression Severity Measure Score 0       Health Habits and Functional and Cognitive Screening:       No data to display                Age-appropriate Screening Schedule:  Refer to the list below for future screening recommendations based on patient's age, sex and/or medical conditions. Orders for these recommended tests are listed in the plan section. The patient has been provided with a written plan.    Health Maintenance   Topic Date Due    MAMMOGRAM  Never done    DXA SCAN  Never done    DIABETIC EYE EXAM  Never done    PAP SMEAR  Never done    COVID-19 Vaccine (5 - 2023-24 season) 09/01/2024    INFLUENZA VACCINE  08/01/2024    HEMOGLOBIN A1C  02/19/2025    LUNG CANCER SCREENING  07/18/2025    DIABETIC FOOT EXAM  08/07/2025    LIPID PANEL  08/19/2025    URINE MICROALBUMIN  08/19/2025    ANNUAL WELLNESS VISIT  09/05/2025    COLORECTAL CANCER SCREENING  12/01/2027    TDAP/TD VACCINES (2 - Td or Tdap) 02/18/2029    HEPATITIS C SCREENING  Completed    Pneumococcal Vaccine 65+  Completed    ZOSTER VACCINE  Completed                  CMS Preventative Services Quick Reference  Risk Factors Identified During Encounter  Immunizations Discussed/Encouraged: Influenza and COVID19  Polypharmacy: Medication List reviewed  Tobacco Use/Dependance Risk (use dotphrase .tobaccocessation for documentation)  The above risks/problems have been discussed with the patient.  Pertinent information has been shared with the patient in the After Visit Summary.  An After Visit Summary and PPPS were made available to the patient.    Follow Up:   Next Medicare Wellness visit to be scheduled in 1 year.       Additional E&M Note during same encounter follows:  Patient has multiple medical problems which are significant and separately identifiable that require additional work above and beyond the Medicare Wellness Visit.      Chief Complaint  4-week follow  up    Subjective        HPI  Dayami Rossi is also being seen today for multiple chronic conditions and discuss labs  History of Present Illness    Labs: Patient CBC reported with slightly elevated white blood cell count at 12.0.  Glucose elevated at 139.  Cholesterol within normal limits.  Hemoglobin A1c at 7.0.  Vitamin B6 low normal at 6.5.  Vitamin D within normal limits at 56.7.  Hepatitis C screen negative.  Microalbumin within normal limits.  Free T4 within normal limits.  T3 total within normal limits.  B12 low normal at 445    Type 2 diabetes: Hemoglobin A1c at 7.0.  She reports compliance with Jardiance 10 mg and glipizide 10 mg twice daily.  Patient reports she was previously prescribed metformin and taken off of this medication.      COPD exacerbation: Patient reports trying Trelegy daily inhaler without much benefit.  Patient reports increased wheezing and cough since weather change.  Patient is a chronic longstanding smoker.  She has never had Maring function studies completed.  Patient has had CT of the chest performed which does note emphysema.    Depression: Patient reports she has been on Cymbalta for several years.  She notes that it has helped with her neuropathy in her lower extremities however it is not helping with her extreme fatigue.  She would like to change medication to Pristiq if possible today to see if this would help with her excessive fatigue.    Review of Systems   Constitutional:  Positive for fatigue. Negative for activity change.   HENT:  Negative for congestion, rhinorrhea, sinus pressure, sore throat and trouble swallowing.    Eyes:  Negative for visual disturbance.   Respiratory:  Positive for cough and shortness of breath. Negative for chest tightness.    Cardiovascular:  Negative for chest pain and palpitations.   Gastrointestinal:  Negative for abdominal pain, constipation, diarrhea and nausea.   Endocrine: Negative for polydipsia and polyuria.   Genitourinary:  Negative  "for dysuria and frequency.   Musculoskeletal:  Negative for back pain and neck pain.   Skin:  Negative for rash and wound.   Neurological:  Positive for tremors. Negative for speech difficulty, weakness and confusion.   Psychiatric/Behavioral:  Negative for agitation, self-injury and sleep disturbance. The patient is not nervous/anxious.        Objective   Vital Signs:  /60 (BP Location: Left arm, Patient Position: Sitting, Cuff Size: Adult)   Pulse 58   Temp 97.8 °F (36.6 °C) (Infrared)   Resp 19   Ht 149.9 cm (59.02\")   Wt 47.7 kg (105 lb 2 oz)   SpO2 98%   BMI 21.22 kg/m²     Physical Exam  Vitals and nursing note reviewed.   Constitutional:       Appearance: Normal appearance. She is well-developed.   HENT:      Head: Normocephalic and atraumatic.      Right Ear: Tympanic membrane, ear canal and external ear normal.      Left Ear: Tympanic membrane, ear canal and external ear normal.      Nose: Nose normal. No septal deviation, nasal tenderness or congestion.      Mouth/Throat:      Lips: Pink. No lesions.      Mouth: Mucous membranes are moist. No oral lesions.      Dentition: Normal dentition.      Pharynx: Oropharynx is clear. No pharyngeal swelling, oropharyngeal exudate or posterior oropharyngeal erythema.   Eyes:      General: Lids are normal. Vision grossly intact. No scleral icterus.        Right eye: No discharge.         Left eye: No discharge.      Extraocular Movements: Extraocular movements intact.      Conjunctiva/sclera: Conjunctivae normal.      Right eye: Right conjunctiva is not injected.      Left eye: Left conjunctiva is not injected.      Pupils: Pupils are equal, round, and reactive to light.   Neck:      Thyroid: No thyroid mass.      Trachea: Trachea normal.   Cardiovascular:      Rate and Rhythm: Normal rate and regular rhythm.      Heart sounds: Normal heart sounds. No murmur heard.     No gallop.   Pulmonary:      Effort: Pulmonary effort is normal.      Breath sounds: " Decreased air movement present. Decreased breath sounds and wheezing present. No rhonchi or rales.   Musculoskeletal:         General: No tenderness or deformity. Normal range of motion.      Cervical back: Full passive range of motion without pain, normal range of motion and neck supple.      Thoracic back: Normal.      Right lower leg: No edema.      Left lower leg: No edema.   Skin:     General: Skin is warm and dry.      Coloration: Skin is not jaundiced.      Findings: No rash.   Neurological:      Mental Status: She is alert and oriented to person, place, and time.      Sensory: Sensation is intact.      Motor: Motor function is intact.      Coordination: Coordination is intact.      Gait: Gait is intact.      Deep Tendon Reflexes: Reflexes are normal and symmetric.   Psychiatric:         Mood and Affect: Mood and affect normal.         Behavior: Behavior normal.         Judgment: Judgment normal.        Physical Exam       The following data was reviewed by: JONY Kaur on 09/05/2024:  Common labs          8/19/2024    10:20   Common Labs   Glucose 139    BUN 19    Creatinine 0.96    Sodium 138    Potassium 4.4    Chloride 99    Calcium 9.3    Total Protein 7.1    Albumin 4.6    Total Bilirubin <0.2    Alkaline Phosphatase 109    AST (SGOT) 17    ALT (SGPT) 9    WBC 12.0    Hemoglobin 13.9    Hematocrit 42.1    Platelets 418    Total Cholesterol 128    Triglycerides 90    HDL Cholesterol 52    LDL Cholesterol  59    Hemoglobin A1C 7.0    Microalbumin, Urine 4.0      Data reviewed :    Results               Assessment and Plan   Diagnoses and all orders for this visit:    1. Medicare annual wellness visit, subsequent (Primary)    2. Neuropathy  -     Discontinue: DULoxetine HCl (DRIZALMA) 30 MG capsule; Take 1 capsule by mouth Daily.  Dispense: 90 capsule; Refill: 2  -     gabapentin (NEURONTIN) 600 MG tablet; Take 1 tablet by mouth 3 (Three) Times a Day.  Dispense: 90 tablet; Refill: 0  -      HYDROcodone-acetaminophen (NORCO)  MG per tablet; Take 1 tablet by mouth Every 8 (Eight) Hours As Needed for Moderate Pain.  Dispense: 90 tablet; Refill: 0    3. Hypothyroidism, unspecified type  -     levothyroxine (SYNTHROID, LEVOTHROID) 100 MCG tablet; Take 1 tablet by mouth Daily.  Dispense: 90 tablet; Refill: 2    4. Essential hypertension  -     simvastatin (ZOCOR) 10 MG tablet; Take 1 tablet by mouth Every Night.  Dispense: 90 tablet; Refill: 2    5. Screening for osteoporosis    6. Post-menopausal    7. S/P right mastectomy  Comments:  with lymph node removal, 2015    8. Type 2 diabetes mellitus with hyperglycemia, without long-term current use of insulin  -     metFORMIN ER (GLUCOPHAGE-XR) 500 MG 24 hr tablet; Take 1 tablet by mouth Daily With Breakfast.  Dispense: 30 tablet; Refill: 2    9. Other fatigue  -     cyanocobalamin injection 1,000 mcg    10. Vitamin D deficiency    11. Pulmonary emphysema, unspecified emphysema type  -     Complete PFT - Pre & Post Bronchodilator; Future    12. B12 deficiency  -     cyanocobalamin 1000 MCG/ML injection; 1ml weekly x 6 weeks, then every other week x 1 month, then monthly.  Dispense: 10 mL; Refill: 0  -     cyanocobalamin injection 1,000 mcg    13. Depression, unspecified depression type  -     Desvenlafaxine Succinate ER (Pristiq) 25 MG tablet sustained-release 24 hour; Take 1 tablet by mouth Daily.  Dispense: 30 tablet; Refill: 2    14. Chronic fatigue  -     Desvenlafaxine Succinate ER (Pristiq) 25 MG tablet sustained-release 24 hour; Take 1 tablet by mouth Daily.  Dispense: 30 tablet; Refill: 2    15. COPD with exacerbation  -     methylPREDNISolone (MEDROL) 4 MG dose pack; Take as directed on package instructions.  Dispense: 21 tablet; Refill: 0    16. Pneumococcal vaccine administered  -     Pneumococcal Conjugate Vaccine 20-Valent All      Assessment & Plan           I spent 10 minutes of time on patient's annual wellness visit and 35 minutes of  time on care of patient's chronic conditions on this date of service.  This time includes time spent by me in the following activities:preparing for the visit, reviewing tests, obtaining and/or reviewing a separately obtained history, performing a medically appropriate examination and/or evaluation , counseling and educating the patient/family/caregiver, ordering medications, tests, or procedures, documenting information in the medical record, independently interpreting results and communicating that information with the patient/family/caregiver, and care coordination  Follow Up   Return in about 4 weeks (around 10/3/2024).  Patient was given instructions and counseling regarding her condition or for health maintenance advice. Please see specific information pulled into the AVS if appropriate.     Patient or patient representative verbalized consent for the use of Ambient Listening during the visit with  JONY Kaur for chart documentation. 9/6/2024  19:16 CDT

## 2024-09-05 NOTE — PROGRESS NOTES
Jayro stated [facility] has been unable to contact to set up. Pt will need to contact [employer] and they will get her set up to mail device. Contact at [phone].    Thanks

## 2024-09-06 ENCOUNTER — TELEPHONE (OUTPATIENT)
Dept: FAMILY MEDICINE CLINIC | Facility: CLINIC | Age: 69
End: 2024-09-06
Payer: MEDICARE

## 2024-09-06 PROBLEM — Z78.0 POST-MENOPAUSAL: Status: ACTIVE | Noted: 2024-09-06

## 2024-09-06 PROBLEM — R53.82 CHRONIC FATIGUE: Status: ACTIVE | Noted: 2024-09-06

## 2024-09-06 PROBLEM — Z13.820 SCREENING FOR OSTEOPOROSIS: Status: ACTIVE | Noted: 2024-09-06

## 2024-09-06 PROBLEM — J44.1 COPD WITH EXACERBATION: Status: ACTIVE | Noted: 2024-09-06

## 2024-09-06 PROBLEM — E53.8 B12 DEFICIENCY: Status: ACTIVE | Noted: 2024-09-06

## 2024-09-06 PROBLEM — F32.A DEPRESSION: Status: ACTIVE | Noted: 2024-09-06

## 2024-09-06 PROBLEM — R53.83 OTHER FATIGUE: Status: ACTIVE | Noted: 2024-09-06

## 2024-09-06 PROBLEM — Z23 PNEUMOCOCCAL VACCINE ADMINISTERED: Status: ACTIVE | Noted: 2024-09-06

## 2024-09-06 NOTE — TELEPHONE ENCOUNTER
I spoke with the medical records at [facility] and she stated she is not seeing any previous Bone Density test but she is going to call me back when she finds anything..

## 2024-09-11 ENCOUNTER — TELEPHONE (OUTPATIENT)
Dept: FAMILY MEDICINE CLINIC | Facility: CLINIC | Age: 69
End: 2024-09-11
Payer: MEDICARE

## 2024-09-11 DIAGNOSIS — Z13.820 SCREENING FOR OSTEOPOROSIS: Primary | ICD-10-CM

## 2024-09-11 DIAGNOSIS — Z78.0 POST-MENOPAUSAL: ICD-10-CM

## 2024-09-17 ENCOUNTER — TELEPHONE (OUTPATIENT)
Dept: HEMATOLOGY | Age: 69
End: 2024-09-17

## 2024-09-19 ENCOUNTER — HOSPITAL ENCOUNTER (OUTPATIENT)
Dept: PULMONOLOGY | Facility: HOSPITAL | Age: 69
Discharge: HOME | End: 2024-09-19
Payer: MEDICARE

## 2024-09-19 DIAGNOSIS — J43.9 PULMONARY EMPHYSEMA, UNSPECIFIED EMPHYSEMA TYPE: ICD-10-CM

## 2024-09-19 PROCEDURE — 94726 PLETHYSMOGRAPHY LUNG VOLUMES: CPT

## 2024-09-19 PROCEDURE — 94729 DIFFUSING CAPACITY: CPT

## 2024-09-19 PROCEDURE — 94060 EVALUATION OF WHEEZING: CPT

## 2024-09-19 RX ORDER — ALBUTEROL SULFATE 0.83 MG/ML
2.5 SOLUTION RESPIRATORY (INHALATION) ONCE
Status: COMPLETED | OUTPATIENT
Start: 2024-09-19 | End: 2024-09-19

## 2024-09-19 RX ADMIN — ALBUTEROL SULFATE 2.5 MG: 2.5 SOLUTION RESPIRATORY (INHALATION) at 13:42

## 2024-09-20 ENCOUNTER — TELEPHONE (OUTPATIENT)
Dept: FAMILY MEDICINE CLINIC | Facility: CLINIC | Age: 69
End: 2024-09-20
Payer: MEDICARE

## 2024-10-03 DIAGNOSIS — E03.9 HYPOTHYROIDISM, UNSPECIFIED TYPE: ICD-10-CM

## 2024-10-03 DIAGNOSIS — G62.9 NEUROPATHY: ICD-10-CM

## 2024-10-03 RX ORDER — LEVOTHYROXINE SODIUM 100 UG/1
100 TABLET ORAL DAILY
Qty: 90 TABLET | Refills: 2 | Status: SHIPPED | OUTPATIENT
Start: 2024-10-03

## 2024-10-03 RX ORDER — LISINOPRIL 20 MG/1
20 TABLET ORAL DAILY
OUTPATIENT
Start: 2024-10-03

## 2024-10-03 NOTE — TELEPHONE ENCOUNTER
Rx Refill Note  Requested Prescriptions     Pending Prescriptions Disp Refills    HYDROcodone-acetaminophen (NORCO)  MG per tablet 90 tablet 0     Sig: Take 1 tablet by mouth Every 8 (Eight) Hours As Needed for Moderate Pain.    gabapentin (NEURONTIN) 600 MG tablet 90 tablet 0     Sig: Take 1 tablet by mouth 3 (Three) Times a Day.     Signed Prescriptions Disp Refills    levothyroxine (SYNTHROID, LEVOTHROID) 100 MCG tablet 90 tablet 2     Sig: Take 1 tablet by mouth Daily.     Authorizing Provider: FAREED MARIE     Ordering User: KENIA MORALES      Last office visit with prescribing clinician: 9/5/2024   Last telemedicine visit with prescribing clinician: Visit date not found   Next office visit with prescribing clinician: 10/3/2024                         Would you like a call back once the refill request has been completed: [] Yes [] No    If the office needs to give you a call back, can they leave a voicemail: [] Yes [] No    Kenia Morales MA  10/03/24, 15:45 CDT

## 2024-10-03 NOTE — TELEPHONE ENCOUNTER
Rx Refill Note  Requested Prescriptions     Pending Prescriptions Disp Refills    HYDROcodone-acetaminophen (NORCO)  MG per tablet 90 tablet 0     Sig: Take 1 tablet by mouth Every 8 (Eight) Hours As Needed for Moderate Pain.      Last office visit with prescribing clinician: 9/5/2024   Last telemedicine visit with prescribing clinician: Visit date not found   Next office visit with prescribing clinician: 10/3/2024                         Would you like a call back once the refill request has been completed: [] Yes [] No    If the office needs to give you a call back, can they leave a voicemail: [] Yes [] No    Kenia Morales MA  10/03/24, 15:44 CDT

## 2024-10-04 RX ORDER — GABAPENTIN 600 MG/1
600 TABLET ORAL 3 TIMES DAILY
Qty: 90 TABLET | Refills: 0 | Status: SHIPPED | OUTPATIENT
Start: 2024-10-04

## 2024-10-04 RX ORDER — HYDROCODONE BITARTRATE AND ACETAMINOPHEN 10; 325 MG/1; MG/1
1 TABLET ORAL EVERY 8 HOURS PRN
Qty: 90 TABLET | Refills: 0 | Status: SHIPPED | OUTPATIENT
Start: 2024-10-04

## 2024-10-07 ENCOUNTER — TELEPHONE (OUTPATIENT)
Dept: HEMATOLOGY | Age: 69
End: 2024-10-07

## 2024-10-07 NOTE — TELEPHONE ENCOUNTER
Spoke with Tara - she confirmed her appointment with  at the [facility] on 10/8/2024 at 2:40pm

## 2024-10-07 NOTE — PROGRESS NOTES
repeat colonoscopy in ten years.   5/27/23 Tumor markers: CA 27.29-32.2, CA 15-3-23.8, CEA 5.6  10/1/23 Bone Density:  not completed  9/18/23 CT Chest Low Dose  ([facility]): No supraclavicular or axillary adenopathy. Fibrotic changes are noted throughout the lung parenchyma. No focal consolidation, plural effusions or suspicious pulmonary nodules. Heart and pericardium appear intact and unremarkable. No acute intrathoracic abnormality.   10/3/23 Mammogram Digital Screening Bilateral ([facility]): There are no masses, areas of architectural distortion, nor microcalcification's suspicious for malignancy. No mammographic evidence of malignancy. Stable exam.   10/31/23 XR Lumbar Spine ([facility]): There is no acute body fracture, wedge compression deformity or subluxation. Vertebral body height is maintained throughout. There is a grade 1 L5-S1 spondylos thesis with multilevel spondylosis. Visualized transverse and spinous processes are normal. No acute findings alignment abnormalities. If symptoms persist suggest MRI.   10/31/23 XR Thoracic Spine ([facility]): There is no acute vertebral body fracture, wedge compression deformity or subluxation, focal kyphosis, scoliosis or paraspinal prominence. There is multilevel osteophytosis. The heart appears enlarges. If symptoms persist, suggest MRI.   5/13/24 Tumor Markers ([facility]): CA 27.29 45.2. CA 15-3 30.0, CEA 6.4  7/8/24 Tumor Markers ([facility]): CA 27.29 47.8, CA 15-3 30.8, CEA 8.2  7/18/24 CT Chest W Contrast ([facility]) No CT evidence for metastatic disease to the chest.  Chronic changes with emphysema and fibrosis in a UIP pattern distribution. Status post right mastectomy.   7/18/24 CT Abd/Pelvis W IV Contrast ([facility]) No acute abnormality of the abdomen or pelvis, particularly, no   finding to suggest neoplastic process or metastatic disease. The chest and lungs are separately reviewed and reported.   7/18/24 NM Bone Scan ([facility]) No convincing scintigraphic evidence of active bony metastasis. Tracer

## 2024-10-08 ENCOUNTER — OFFICE VISIT (OUTPATIENT)
Dept: HEMATOLOGY | Age: 69
End: 2024-10-08

## 2024-10-08 VITALS
BODY MASS INDEX: 20.56 KG/M2 | HEIGHT: 59 IN | OXYGEN SATURATION: 96 % | HEART RATE: 102 BPM | SYSTOLIC BLOOD PRESSURE: 102 MMHG | WEIGHT: 102 LBS | DIASTOLIC BLOOD PRESSURE: 60 MMHG

## 2024-10-08 DIAGNOSIS — Z12.31 SCREENING MAMMOGRAM, ENCOUNTER FOR: ICD-10-CM

## 2024-10-08 DIAGNOSIS — Z17.0 MALIGNANT NEOPLASM OF UPPER-INNER QUADRANT OF RIGHT BREAST IN FEMALE, ESTROGEN RECEPTOR POSITIVE (HCC): Primary | ICD-10-CM

## 2024-10-08 DIAGNOSIS — Z71.89 CARE PLAN DISCUSSED WITH PATIENT: ICD-10-CM

## 2024-10-08 DIAGNOSIS — Z79.811 ENCOUNTER FOR MONITORING AROMATASE INHIBITOR THERAPY: ICD-10-CM

## 2024-10-08 DIAGNOSIS — Z51.81 ENCOUNTER FOR MONITORING AROMATASE INHIBITOR THERAPY: ICD-10-CM

## 2024-10-08 DIAGNOSIS — C50.211 MALIGNANT NEOPLASM OF UPPER-INNER QUADRANT OF RIGHT BREAST IN FEMALE, ESTROGEN RECEPTOR POSITIVE (HCC): Primary | ICD-10-CM

## 2024-10-16 DIAGNOSIS — Z91.89 AT RISK FOR CARDIAC ARRHYTHMIA: ICD-10-CM

## 2024-10-16 DIAGNOSIS — R53.83 OTHER FATIGUE: ICD-10-CM

## 2024-10-16 DIAGNOSIS — G47.34 NOCTURNAL OXYGEN DESATURATION: ICD-10-CM

## 2024-10-16 DIAGNOSIS — R09.02 HYPOXIA: Primary | ICD-10-CM

## 2024-10-16 DIAGNOSIS — R06.83 SNORING: ICD-10-CM

## 2024-10-16 DIAGNOSIS — R00.2 PALPITATIONS: ICD-10-CM

## 2024-10-17 ENCOUNTER — OFFICE VISIT (OUTPATIENT)
Dept: FAMILY MEDICINE CLINIC | Facility: CLINIC | Age: 69
End: 2024-10-17
Payer: MEDICARE

## 2024-10-17 VITALS
BODY MASS INDEX: 20.76 KG/M2 | HEART RATE: 86 BPM | SYSTOLIC BLOOD PRESSURE: 110 MMHG | HEIGHT: 59 IN | OXYGEN SATURATION: 95 % | WEIGHT: 103 LBS | RESPIRATION RATE: 19 BRPM | DIASTOLIC BLOOD PRESSURE: 55 MMHG | TEMPERATURE: 97.5 F

## 2024-10-17 DIAGNOSIS — M54.50 CHRONIC BILATERAL LOW BACK PAIN, UNSPECIFIED WHETHER SCIATICA PRESENT: ICD-10-CM

## 2024-10-17 DIAGNOSIS — I10 ESSENTIAL HYPERTENSION: Primary | ICD-10-CM

## 2024-10-17 DIAGNOSIS — Z23 FLU VACCINE NEED: ICD-10-CM

## 2024-10-17 DIAGNOSIS — J44.1 COPD WITH EXACERBATION: ICD-10-CM

## 2024-10-17 DIAGNOSIS — R09.02 HYPOXIA: ICD-10-CM

## 2024-10-17 DIAGNOSIS — F32.A DEPRESSION, UNSPECIFIED DEPRESSION TYPE: ICD-10-CM

## 2024-10-17 DIAGNOSIS — R00.1 BRADYCARDIA: ICD-10-CM

## 2024-10-17 DIAGNOSIS — G89.29 CHRONIC BILATERAL LOW BACK PAIN, UNSPECIFIED WHETHER SCIATICA PRESENT: ICD-10-CM

## 2024-10-17 DIAGNOSIS — G47.34 NOCTURNAL OXYGEN DESATURATION: ICD-10-CM

## 2024-10-17 PROCEDURE — 99214 OFFICE O/P EST MOD 30 MIN: CPT | Performed by: NURSE PRACTITIONER

## 2024-10-17 PROCEDURE — 1159F MED LIST DOCD IN RCRD: CPT | Performed by: NURSE PRACTITIONER

## 2024-10-17 PROCEDURE — 3051F HG A1C>EQUAL 7.0%<8.0%: CPT | Performed by: NURSE PRACTITIONER

## 2024-10-17 PROCEDURE — 3074F SYST BP LT 130 MM HG: CPT | Performed by: NURSE PRACTITIONER

## 2024-10-17 PROCEDURE — G0008 ADMIN INFLUENZA VIRUS VAC: HCPCS | Performed by: NURSE PRACTITIONER

## 2024-10-17 PROCEDURE — 3078F DIAST BP <80 MM HG: CPT | Performed by: NURSE PRACTITIONER

## 2024-10-17 PROCEDURE — 90662 IIV NO PRSV INCREASED AG IM: CPT | Performed by: NURSE PRACTITIONER

## 2024-10-17 PROCEDURE — 1160F RVW MEDS BY RX/DR IN RCRD: CPT | Performed by: NURSE PRACTITIONER

## 2024-10-17 PROCEDURE — 1126F AMNT PAIN NOTED NONE PRSNT: CPT | Performed by: NURSE PRACTITIONER

## 2024-10-17 RX ORDER — METHYLPREDNISOLONE 4 MG
TABLET, DOSE PACK ORAL
Qty: 21 TABLET | Refills: 0 | Status: SHIPPED | OUTPATIENT
Start: 2024-10-17

## 2024-10-17 RX ORDER — GUAIFENESIN 600 MG/1
600 TABLET, EXTENDED RELEASE ORAL 2 TIMES DAILY
Qty: 60 TABLET | Refills: 11 | Status: SHIPPED | OUTPATIENT
Start: 2024-10-17

## 2024-10-17 RX ORDER — DULOXETIN HYDROCHLORIDE 60 MG/1
60 CAPSULE, DELAYED RELEASE ORAL DAILY
COMMUNITY
End: 2024-10-17 | Stop reason: SDUPTHER

## 2024-10-17 RX ORDER — LISINOPRIL 20 MG/1
20 TABLET ORAL DAILY
Qty: 90 TABLET | Refills: 2 | Status: SHIPPED | OUTPATIENT
Start: 2024-10-17

## 2024-10-17 RX ORDER — DULOXETIN HYDROCHLORIDE 60 MG/1
60 CAPSULE, DELAYED RELEASE ORAL DAILY
Qty: 90 CAPSULE | Refills: 4 | Status: SHIPPED | OUTPATIENT
Start: 2024-10-17

## 2024-10-17 RX ORDER — DOXYCYCLINE 100 MG/1
100 CAPSULE ORAL 2 TIMES DAILY
Qty: 20 CAPSULE | Refills: 0 | Status: SHIPPED | OUTPATIENT
Start: 2024-10-17

## 2024-10-17 NOTE — PATIENT INSTRUCTIONS
Start on oxygen 2l at bedtime through [employer].   Repeat overnight O2 after you have been on oxygen a few weeks and URI symptoms have improved.   Start medrol dose pack and doxycline for copd exacerbation.   Take tizanidine 4mg 1/2 - 1 tablet at bedtime as needed for low back discomfort. Do not drive after taking.   Muccinex 600mg twice daily to help with chronic mucus production/ prevention of infection.   6. Call [phone] to schedule holter monitor if they do not contact you by Monday.

## 2024-10-17 NOTE — PROGRESS NOTES
JONY Kaur  [facility]   Family Medicine  [address]  [place] [address]  Phone: [phone]  Fax: [phone]         Chief Complaint:  Chief Complaint   Patient presents with    4-Week follow up     Patient is following up on being prescribed Pristiq and being taking off of the Cymbalta, patient stated that she had a bad reaction to the Pristiq, patient stated that she has since discontinued the Pristiq and returned to her Cymbalta.        History:  Dayami Rossi is a 69 y.o. female.  History of Present Illness  The patient presents for evaluation of multiple medical concerns.    Depression: She reports that her condition worsened after taking Pristiq, leading to feelings of depression and inactivity. She has since switched back to Cymbalta 60 mg. While on Pristiq, she experienced dizziness and confusion, but these symptoms have since resolved. However, her fatigue level remains unchanged.    Hypoxia: She mentions that she was not on oxygen at 6:00 in the morning during her sleep study, which she believes did not yield accurate results due to her lack of sleep that night. She has been on oxygen therapy before and is aware of its benefits.    Arthritis/ Chronic back pain: She acknowledges that her arthritis and back pain have worsened due to heavy lifting and moving furniture. She has taken muscle relaxants at bedtime in the past without any adverse effects.    Recurrent URI: She has a persistent cough, producing white phlegm, with no yellow or green discoloration. She has used Mucinex in the past and is currently using a Trelegy inhaler, although she does not believe it is effective. She also uses a nebulizer.    Supplemental Information:  She had a reaction to chemotherapy and almost . She had a bone density test done. She had a mammogram yesterday.       ROS:  Review of Systems   Constitutional:  Positive for fatigue. Negative for fever and unexpected weight  change.   HENT:  Negative for congestion, ear pain, rhinorrhea, sinus pressure, sinus pain and voice change.    Eyes:  Negative for visual disturbance.   Respiratory:  Negative for shortness of breath and wheezing.    Cardiovascular:  Negative for chest pain and palpitations.   Gastrointestinal:  Negative for abdominal pain, nausea and vomiting.   Genitourinary:  Negative for dysuria and flank pain.   Musculoskeletal:  Positive for arthralgias and back pain. Negative for myalgias and neck pain.   Skin:  Negative for color change and rash.   Neurological:  Negative for dizziness, weakness, numbness and headaches.   Psychiatric/Behavioral:  Negative for behavioral problems, dysphoric mood, self-injury and sleep disturbance.         reports that she has been smoking cigarettes. She has a 22.5 pack-year smoking history. She has never used smokeless tobacco. She reports that she does not drink alcohol and does not use drugs.    Current Outpatient Medications   Medication Instructions    albuterol (PROVENTIL HFA;VENTOLIN HFA) 108 (90 Base) MCG/ACT inhaler 2 puffs, Every 4 Hours PRN    ALPRAZolam (XANAX) 1 mg, 2 Times Daily PRN    amLODIPine (NORVASC) 5 mg, Oral, Daily    Calcium Citrate-Vitamin D (CALCIUM + D PO) 1 tablet, Daily    cyanocobalamin 1000 MCG/ML injection 1ml weekly x 6 weeks, then every other week x 1 month, then monthly.    doxycycline (VIBRAMYCIN) 100 mg, Oral, 2 Times Daily    DULoxetine (CYMBALTA) 60 mg, Oral, Daily    empagliflozin (JARDIANCE) 10 mg    gabapentin (NEURONTIN) 600 mg, Oral, 3 Times Daily    guaiFENesin (MUCINEX) 600 mg, Oral, 2 Times Daily    HYDROcodone-acetaminophen (NORCO)  MG per tablet 1 tablet, Oral, Every 8 Hours PRN    letrozole (FEMARA) 2.5 mg, Oral, Daily    levothyroxine (SYNTHROID, LEVOTHROID) 100 mcg, Oral, Daily    lisinopril (PRINIVIL,ZESTRIL) 20 mg, Oral, Daily    metFORMIN ER (GLUCOPHAGE-XR) 500 mg, Oral, Daily With Breakfast    methylPREDNISolone (MEDROL) 4 MG dose  "pack Take as directed on package instructions.    multivitamin with minerals 1 tablet/day    potassium chloride (K-DUR) 10 MEQ CR tablet 10 mEq, Daily    simvastatin (ZOCOR) 10 mg, Oral, Nightly    tiZANidine (Zanaflex) 4 MG tablet Take 1/2 - 1 tablet at bedtime. Do not drive after taking.    traZODone (DESYREL) 100 MG tablet TAKE 1-3 TAB BY MOUTH EVERY DAY AT BEDTIME AS NEEDED FOR SLEEP       OBJECTIVE:  /55 (BP Location: Left arm, Patient Position: Sitting, Cuff Size: Adult)   Pulse 86   Temp 97.5 °F (36.4 °C) (Infrared)   Resp 19   Ht 149.9 cm (59.02\")   Wt 46.7 kg (103 lb)   LMP  (LMP Unknown)   SpO2 95%   BMI 20.79 kg/m²    Physical Exam  Vitals and nursing note reviewed.   Constitutional:       Appearance: Normal appearance. She is well-developed.   HENT:      Head: Normocephalic and atraumatic.      Right Ear: Tympanic membrane, ear canal and external ear normal.      Left Ear: Tympanic membrane, ear canal and external ear normal.      Nose: Nose normal. No septal deviation, nasal tenderness or congestion.      Mouth/Throat:      Lips: Pink. No lesions.      Mouth: Mucous membranes are moist. No oral lesions.      Dentition: Normal dentition.      Pharynx: Oropharynx is clear. No pharyngeal swelling, oropharyngeal exudate or posterior oropharyngeal erythema.   Eyes:      General: Lids are normal. Vision grossly intact. No scleral icterus.        Right eye: No discharge.         Left eye: No discharge.      Extraocular Movements: Extraocular movements intact.      Conjunctiva/sclera: Conjunctivae normal.      Right eye: Right conjunctiva is not injected.      Left eye: Left conjunctiva is not injected.      Pupils: Pupils are equal, round, and reactive to light.   Neck:      Thyroid: No thyroid mass.      Trachea: Trachea normal.   Cardiovascular:      Rate and Rhythm: Normal rate and regular rhythm.      Heart sounds: Normal heart sounds. No murmur heard.     No gallop.   Pulmonary:      Effort: " Pulmonary effort is normal.      Breath sounds: Decreased air movement present. Decreased breath sounds present. No wheezing, rhonchi or rales.   Musculoskeletal:         General: No tenderness or deformity. Normal range of motion.      Cervical back: Full passive range of motion without pain, normal range of motion and neck supple.      Thoracic back: Normal.      Right lower leg: No edema.      Left lower leg: No edema.   Skin:     General: Skin is warm and dry.      Coloration: Skin is not jaundiced.      Findings: No rash.   Neurological:      Mental Status: She is alert and oriented to person, place, and time.      Sensory: Sensation is intact.      Motor: Motor function is intact.      Coordination: Coordination is intact.      Gait: Gait is intact.      Deep Tendon Reflexes: Reflexes are normal and symmetric.   Psychiatric:         Mood and Affect: Mood and affect normal.         Behavior: Behavior normal.         Judgment: Judgment normal.       Physical Exam  Crackles noted in lungs.    BMI is within normal parameters. No other follow-up for BMI required.    Procedures    Results  Testing  Sleep study showed oxygen dropped down to 74 in the night and spent 253 minutes below 89%. Heart rate dropped down to 19.    Assessment/Plan:     Diagnoses and all orders for this visit:    1. Essential hypertension (Primary)  -     lisinopril (PRINIVIL,ZESTRIL) 20 MG tablet; Take 1 tablet by mouth Daily.  Dispense: 90 tablet; Refill: 2    2. Flu vaccine need  -     Fluzone High-Dose 65+yrs    3. Hypoxia  -     Oxygen Therapy  -     Overnight Sleep Oximetry Study; Future    4. Nocturnal oxygen desaturation  -     Oxygen Therapy  -     Overnight Sleep Oximetry Study; Future    5. Bradycardia  -     Overnight Sleep Oximetry Study; Future    6. Depression, unspecified depression type  -     DULoxetine (CYMBALTA) 60 MG capsule; Take 1 capsule by mouth Daily.  Dispense: 90 capsule; Refill: 4    7. COPD with exacerbation  -      methylPREDNISolone (MEDROL) 4 MG dose pack; Take as directed on package instructions.  Dispense: 21 tablet; Refill: 0  -     guaiFENesin (Mucinex) 600 MG 12 hr tablet; Take 1 tablet by mouth 2 (Two) Times a Day.  Dispense: 60 tablet; Refill: 11  -     doxycycline (VIBRAMYCIN) 100 MG capsule; Take 1 capsule by mouth 2 (Two) Times a Day.  Dispense: 20 capsule; Refill: 0    8. Chronic bilateral low back pain, unspecified whether sciatica present  -     tiZANidine (Zanaflex) 4 MG tablet; Take 1/2 - 1 tablet at bedtime. Do not drive after taking.  Dispense: 30 tablet; Refill: 2          An After Visit Summary was printed and given to the patient at discharge.  Return in about 2 months (around 12/17/2024).       Assessment & Plan  1. Suspected obstructive sleep apnea.  Her sleep study indicated a decrease in heart rate and oxygen levels, suggesting potential abnormal heart rhythms. However, the study's duration was insufficient to conclusively rule out obstructive sleep apnea, necessitating an in-lab sleep study. A prescription for 2 L of oxygen at bedtime through [facility] will be initiated. An overnight O2 test will be repeated after a few weeks of oxygen therapy and improvement of her URI symptoms. A Holter monitor will be scheduled. She is advised to contact [phone] to schedule the Holter monitor if not contacted by Monday.    2. Depression.  Pristiq worsened her symptoms, leading to increased depression and inactivity. She has resumed taking Duloxetine 60 mg. A refill for Duloxetine 60 mg will be sent to her pharmacy.    3. Low back discomfort.  Tizanidine 4 mg (0.5 to 1 tablet) will be prescribed for bedtime use as needed. She is advised to avoid activities such as going to [employer] or using [employer] after taking the medication.    4. Chronic mucus production/prevention of infection.  A Medrol Dosepak and doxycycline will be initiated for COPD exacerbation. She is instructed to stop calcium while on  doxycycline. Mucinex 600 mg will be prescribed twice daily to manage chronic mucus production and prevent infection.    5. Fatigue.  Fatigue persists. Oxygen therapy at night is expected to improve her symptoms.    6. Dizziness and confusion.  These symptoms were associated with Pristiq and have resolved since discontinuing the medication.    7. Arthritis and back pain.  Symptoms have worsened due to heavy lifting and moving furniture. Steroids and muscle relaxers are recommended to manage the pain.    8. Chronic cough.  She reports white sputum production. Steroids and Mucinex are expected to help. She is advised to use a nebulizer if congestion worsens.    9. Health Maintenance.  A flu shot will be administered today. She had a mammogram yesterday, and results are pending.    Follow-up  Return in 2 months for follow-up.    I spent 35 minutes caring for Dayami on this date of service. This time includes time spent by me in the following activities: preparing for the visit, reviewing tests, performing a medically appropriate examination and/or evaluation, counseling and educating the patient/family/caregiver, referring and communicating with other health care professionals, documenting information in the medical record, independently interpreting results and communicating that information with the patient/family/caregiver, care coordination, ordering medications, and ordering test(s)     : I have been treating this patient over a continuum addressing both chronic and acute care concerns.     Claudia BROGES 10/20/2024   Electronically signed.    Patient or patient representative verbalized consent for the use of [employer] during the visit with  JONY Kaur for chart documentation. 10/20/2024  21:34 CDT

## 2024-10-24 ENCOUNTER — TELEPHONE (OUTPATIENT)
Dept: NEUROLOGY | Age: 69
End: 2024-10-24

## 2024-10-24 NOTE — TELEPHONE ENCOUNTER
1st attempt: Received a referral for this patient. Called and left patient a VM for patient to call our office back to where we can get patient scheduled an appointment with Dr. Mclaughlin.

## 2024-10-29 ENCOUNTER — HOSPITAL ENCOUNTER (OUTPATIENT)
Dept: CARDIOLOGY | Facility: HOSPITAL | Age: 69
Discharge: HOME | End: 2024-10-29
Admitting: NURSE PRACTITIONER
Payer: MEDICARE

## 2024-10-29 DIAGNOSIS — Z91.89 AT RISK FOR CARDIAC ARRHYTHMIA: ICD-10-CM

## 2024-10-29 DIAGNOSIS — G47.34 NOCTURNAL OXYGEN DESATURATION: ICD-10-CM

## 2024-10-29 DIAGNOSIS — R06.83 SNORING: ICD-10-CM

## 2024-10-29 DIAGNOSIS — R00.2 PALPITATIONS: ICD-10-CM

## 2024-10-29 DIAGNOSIS — R53.83 OTHER FATIGUE: ICD-10-CM

## 2024-10-29 PROCEDURE — 93246 EXT ECG>7D<15D RECORDING: CPT

## 2024-11-07 DIAGNOSIS — G62.9 NEUROPATHY: ICD-10-CM

## 2024-11-07 RX ORDER — HYDROCODONE BITARTRATE AND ACETAMINOPHEN 10; 325 MG/1; MG/1
1 TABLET ORAL EVERY 8 HOURS PRN
Qty: 90 TABLET | Refills: 0 | Status: SHIPPED | OUTPATIENT
Start: 2024-11-07

## 2024-11-07 NOTE — TELEPHONE ENCOUNTER
Rx Refill Note  Requested Prescriptions     Pending Prescriptions Disp Refills    HYDROcodone-acetaminophen (NORCO)  MG per tablet 90 tablet 0     Sig: Take 1 tablet by mouth Every 8 (Eight) Hours As Needed for Moderate Pain.      Last office visit with prescribing clinician: 10/17/2024   Last telemedicine visit with prescribing clinician: Visit date not found   Next office visit with prescribing clinician: 12/17/2024                         Would you like a call back once the refill request has been completed: [] Yes [] No    If the office needs to give you a call back, can they leave a voicemail: [] Yes [] No    Kenia Morales MA  11/07/24, 09:14 CST

## 2024-11-08 DIAGNOSIS — G62.9 NEUROPATHY: ICD-10-CM

## 2024-11-08 RX ORDER — GABAPENTIN 600 MG/1
600 TABLET ORAL 3 TIMES DAILY
Qty: 90 TABLET | Refills: 0 | Status: SHIPPED | OUTPATIENT
Start: 2024-11-08

## 2024-11-20 LAB
CV ZIO BASELINE AVG BPM: 86 BPM
CV ZIO BASELINE BPM HIGH: 131 BPM
CV ZIO BASELINE BPM LOW: 65 BPM
CV ZIO DEVICE ANALYSIS TIME: NORMAL
CV ZIO ECT SVE COUNT: 576 EPISODES
CV ZIO ECT SVE CPLT COUNT: 0 EPISODES
CV ZIO ECT SVE CPLT FREQ: 0
CV ZIO ECT SVE FREQ: NORMAL
CV ZIO ECT SVE TPLT COUNT: 0 EPISODES
CV ZIO ECT SVE TPLT FREQ: 0
CV ZIO ECT VE COUNT: 4664 EPISODES
CV ZIO ECT VE CPLT COUNT: 8 EPISODES
CV ZIO ECT VE CPLT FREQ: NORMAL
CV ZIO ECT VE FREQ: NORMAL
CV ZIO ECT VE TPLT COUNT: 0 EPISODES
CV ZIO ECT VE TPLT FREQ: 0
CV ZIO ECTOPIC SVE COUPLET RAW PERCENT: 0 %
CV ZIO ECTOPIC SVE ISOLATED PERCENT: 0.04 %
CV ZIO ECTOPIC SVE TRIPLET RAW PERCENT: 0 %
CV ZIO ECTOPIC VE COUPLET RAW PERCENT: 0 %
CV ZIO ECTOPIC VE ISOLATED PERCENT: 0.3 %
CV ZIO ECTOPIC VE TRIPLET RAW PERCENT: 0 %
CV ZIO ENROLLMENT END: NORMAL
CV ZIO ENROLLMENT START: NORMAL
CV ZIO L TRIGEMINY DUR: 10.2 SEC
CV ZIO L TRIGEMINY END: NORMAL
CV ZIO L TRIGEMINY START: NORMAL
CV ZIO PATIENT EVENTS DIARIES: 0
CV ZIO PATIENT EVENTS TRIGGERS: 0
CV ZIO PAUSE COUNT: 0
CV ZIO PRESCRIPTION STATUS: NORMAL
CV ZIO SVT COUNT: 0
CV ZIO TOTAL  ENROLLMENT PERIOD: NORMAL
CV ZIO VT COUNT: 0

## 2024-12-01 DIAGNOSIS — E11.65 TYPE 2 DIABETES MELLITUS WITH HYPERGLYCEMIA, WITHOUT LONG-TERM CURRENT USE OF INSULIN: ICD-10-CM

## 2024-12-02 RX ORDER — METFORMIN HYDROCHLORIDE 500 MG/1
500 TABLET, EXTENDED RELEASE ORAL
Qty: 90 TABLET | Refills: 2 | Status: SHIPPED | OUTPATIENT
Start: 2024-12-02

## 2024-12-06 DIAGNOSIS — G62.9 NEUROPATHY: ICD-10-CM

## 2024-12-06 RX ORDER — GABAPENTIN 600 MG/1
600 TABLET ORAL 3 TIMES DAILY
Qty: 90 TABLET | Refills: 0 | Status: SHIPPED | OUTPATIENT
Start: 2024-12-06

## 2024-12-06 RX ORDER — HYDROCODONE BITARTRATE AND ACETAMINOPHEN 10; 325 MG/1; MG/1
1 TABLET ORAL EVERY 8 HOURS PRN
Qty: 90 TABLET | Refills: 0 | Status: SHIPPED | OUTPATIENT
Start: 2024-12-06

## 2024-12-06 NOTE — TELEPHONE ENCOUNTER
Rx Refill Note  Requested Prescriptions     Pending Prescriptions Disp Refills    HYDROcodone-acetaminophen (NORCO)  MG per tablet 90 tablet 0     Sig: Take 1 tablet by mouth Every 8 (Eight) Hours As Needed for Moderate Pain.    gabapentin (NEURONTIN) 600 MG tablet 90 tablet 0     Sig: Take 1 tablet by mouth 3 (Three) Times a Day.      Last office visit with prescribing clinician: 10/17/2024   Last telemedicine visit with prescribing clinician: Visit date not found   Next office visit with prescribing clinician: 12/17/2024                         Would you like a call back once the refill request has been completed: [] Yes [] No    If the office needs to give you a call back, can they leave a voicemail: [] Yes [] No    Kenia Morales MA  12/06/24, 11:07 CST

## 2024-12-17 ENCOUNTER — OFFICE VISIT (OUTPATIENT)
Dept: FAMILY MEDICINE CLINIC | Facility: CLINIC | Age: 69
End: 2024-12-17
Payer: MEDICARE

## 2024-12-17 VITALS
HEART RATE: 89 BPM | TEMPERATURE: 97.7 F | WEIGHT: 100.25 LBS | BODY MASS INDEX: 20.21 KG/M2 | RESPIRATION RATE: 20 BRPM | DIASTOLIC BLOOD PRESSURE: 71 MMHG | OXYGEN SATURATION: 93 % | HEIGHT: 59 IN | SYSTOLIC BLOOD PRESSURE: 110 MMHG

## 2024-12-17 DIAGNOSIS — F32.A DEPRESSION, UNSPECIFIED DEPRESSION TYPE: Primary | ICD-10-CM

## 2024-12-17 DIAGNOSIS — G89.29 CHRONIC BILATERAL LOW BACK PAIN, UNSPECIFIED WHETHER SCIATICA PRESENT: ICD-10-CM

## 2024-12-17 DIAGNOSIS — E55.9 VITAMIN D DEFICIENCY: ICD-10-CM

## 2024-12-17 DIAGNOSIS — J44.9 CHRONIC OBSTRUCTIVE PULMONARY DISEASE, UNSPECIFIED COPD TYPE: ICD-10-CM

## 2024-12-17 DIAGNOSIS — I10 ESSENTIAL HYPERTENSION: ICD-10-CM

## 2024-12-17 DIAGNOSIS — G47.34 NOCTURNAL OXYGEN DESATURATION: ICD-10-CM

## 2024-12-17 DIAGNOSIS — E53.8 B12 DEFICIENCY: ICD-10-CM

## 2024-12-17 DIAGNOSIS — E11.65 TYPE 2 DIABETES MELLITUS WITH HYPERGLYCEMIA, WITHOUT LONG-TERM CURRENT USE OF INSULIN: ICD-10-CM

## 2024-12-17 DIAGNOSIS — R53.83 OTHER FATIGUE: ICD-10-CM

## 2024-12-17 DIAGNOSIS — E03.9 HYPOTHYROIDISM, UNSPECIFIED TYPE: ICD-10-CM

## 2024-12-17 DIAGNOSIS — M54.50 CHRONIC BILATERAL LOW BACK PAIN, UNSPECIFIED WHETHER SCIATICA PRESENT: ICD-10-CM

## 2024-12-17 PROCEDURE — 3074F SYST BP LT 130 MM HG: CPT | Performed by: NURSE PRACTITIONER

## 2024-12-17 PROCEDURE — 3078F DIAST BP <80 MM HG: CPT | Performed by: NURSE PRACTITIONER

## 2024-12-17 PROCEDURE — 1126F AMNT PAIN NOTED NONE PRSNT: CPT | Performed by: NURSE PRACTITIONER

## 2024-12-17 PROCEDURE — G2211 COMPLEX E/M VISIT ADD ON: HCPCS | Performed by: NURSE PRACTITIONER

## 2024-12-17 PROCEDURE — 99214 OFFICE O/P EST MOD 30 MIN: CPT | Performed by: NURSE PRACTITIONER

## 2024-12-17 PROCEDURE — 3051F HG A1C>EQUAL 7.0%<8.0%: CPT | Performed by: NURSE PRACTITIONER

## 2024-12-17 NOTE — PROGRESS NOTES
JONY Kaur  [facility]   Family Medicine  [address]  [place] [address]  Phone: [phone]  Fax: [phone]         Chief Complaint:  Chief Complaint   Patient presents with    2-month follow up     On Essential hypertension        History:  Dayami Rossi is a 69 y.o. female.  History of Present Illness  The patient is a 69-year-old female who presents today for follow-up.    Depression/ anxiety: Her last visit was on 10/17/2024, during which she reported that Pristiq was not effective in managing her depression, leading to a switch back to Cymbalta. She reports that her depression is stable with the current medication regimen of Cymbalta 60 mg. She is also on Xanax, prescribed by her mental health nurse practitioner, taking a quarter of a pill daily with the goal of discontinuing its use.    Arthritis/ chronic pain: She continues to experience arthritis and chronic pain, with no significant improvement noted. She has been on steroids for some time. She has initiated cinnamon pill supplementation today. She is receiving B12 injections, which she reports as beneficial. She is currently taking half a tablet of a muscle relaxer at bedtime. PMDP reviewed. RX contract in media.     COPD: Her upper respiratory symptoms have improved, although she still experiences coughing, wheezing, and hacking. She has been using inhalers, including Trelegy once daily, but is uncertain about their efficacy. She has not tried Breztri. She reports a slight improvement in her fatigue levels and does not feel as tired as before.    MEDICATIONS  Cymbalta, Xanax, Trelegy, cinnamon pills, B12 injections.           ROS:  Review of Systems   Constitutional:  Negative for fatigue, fever and unexpected weight change.   HENT:  Negative for congestion, ear pain, rhinorrhea, sinus pressure, sinus pain and voice change.    Eyes:  Negative for visual disturbance.   Respiratory:  Positive for cough.  Negative for shortness of breath and wheezing.    Cardiovascular:  Negative for chest pain and palpitations.   Gastrointestinal:  Negative for abdominal pain, nausea and vomiting.   Endocrine: Negative for polydipsia and polyuria.   Genitourinary:  Negative for dysuria and flank pain.   Musculoskeletal:  Negative for back pain, myalgias and neck pain.   Skin:  Negative for color change and rash.   Neurological:  Negative for dizziness, tremors, speech difficulty, weakness, numbness and headaches.   Psychiatric/Behavioral:  Negative for behavioral problems, confusion, dysphoric mood, self-injury and sleep disturbance.         reports that she has been smoking cigarettes. She has a 22.5 pack-year smoking history. She has never used smokeless tobacco. She reports that she does not drink alcohol and does not use drugs.    Current Outpatient Medications   Medication Instructions    albuterol (PROVENTIL HFA;VENTOLIN HFA) 108 (90 Base) MCG/ACT inhaler 2 puffs, Every 4 Hours PRN    ALPRAZolam (XANAX) 1 mg, 2 Times Daily PRN    amLODIPine (NORVASC) 5 mg, Oral, Daily    Budeson-Glycopyrrol-Formoterol (BREZTRI) 160-9-4.8 MCG/ACT aerosol inhaler 2 puffs, Inhalation, 2 Times Daily    Calcium Citrate-Vitamin D (CALCIUM + D PO) 1 tablet, Daily    cyanocobalamin 1000 MCG/ML injection 1ml weekly x 6 weeks, then every other week x 1 month, then monthly.    doxycycline (VIBRAMYCIN) 100 mg, Oral, 2 Times Daily    DULoxetine (CYMBALTA) 60 mg, Oral, Daily    empagliflozin (JARDIANCE) 10 mg    gabapentin (NEURONTIN) 600 mg, Oral, 3 Times Daily    guaiFENesin (MUCINEX) 600 mg, Oral, 2 Times Daily    HYDROcodone-acetaminophen (NORCO)  MG per tablet 1 tablet, Oral, Every 8 Hours PRN    letrozole (FEMARA) 2.5 mg, Oral, Daily    levothyroxine (SYNTHROID, LEVOTHROID) 100 mcg, Oral, Daily    lisinopril (PRINIVIL,ZESTRIL) 20 mg, Oral, Daily    metFORMIN ER (GLUCOPHAGE-XR) 500 mg, Oral, Daily With Breakfast    methylPREDNISolone (MEDROL) 4  "MG dose pack Take as directed on package instructions.    multivitamin with minerals 1 tablet/day    potassium chloride (K-DUR) 10 MEQ CR tablet 10 mEq, Daily    simvastatin (ZOCOR) 10 mg, Oral, Nightly    tiZANidine (Zanaflex) 4 MG tablet Take 1/2 - 1 tablet at bedtime. Do not drive after taking.    traZODone (DESYREL) 100 MG tablet TAKE 1-3 TAB BY MOUTH EVERY DAY AT BEDTIME AS NEEDED FOR SLEEP       OBJECTIVE:  /71 (BP Location: Left arm, Patient Position: Sitting, Cuff Size: Adult)   Pulse 89   Temp 97.7 °F (36.5 °C) (Infrared)   Resp 20   Ht 149.9 cm (59.02\")   Wt 45.5 kg (100 lb 4 oz)   LMP  (LMP Unknown)   SpO2 93%   BMI 20.24 kg/m²    Physical Exam  Vitals and nursing note reviewed.   Constitutional:       Appearance: Normal appearance. She is well-developed.   HENT:      Head: Normocephalic and atraumatic.      Right Ear: Tympanic membrane, ear canal and external ear normal.      Left Ear: Tympanic membrane, ear canal and external ear normal.      Nose: Nose normal. No septal deviation, nasal tenderness or congestion.      Mouth/Throat:      Lips: Pink. No lesions.      Mouth: Mucous membranes are moist. No oral lesions.      Dentition: Normal dentition.      Pharynx: Oropharynx is clear. No pharyngeal swelling, oropharyngeal exudate or posterior oropharyngeal erythema.   Eyes:      General: Lids are normal. Vision grossly intact. No scleral icterus.        Right eye: No discharge.         Left eye: No discharge.      Extraocular Movements: Extraocular movements intact.      Conjunctiva/sclera: Conjunctivae normal.      Right eye: Right conjunctiva is not injected.      Left eye: Left conjunctiva is not injected.      Pupils: Pupils are equal, round, and reactive to light.   Neck:      Thyroid: No thyroid mass.      Trachea: Trachea normal.   Cardiovascular:      Rate and Rhythm: Normal rate and regular rhythm.      Heart sounds: Normal heart sounds. No murmur heard.     No gallop.   Pulmonary: "      Effort: Pulmonary effort is normal.      Breath sounds: Decreased air movement present. Decreased breath sounds and wheezing present. No rhonchi or rales.   Musculoskeletal:         General: No tenderness or deformity. Normal range of motion.      Cervical back: Full passive range of motion without pain, normal range of motion and neck supple.      Thoracic back: Normal.      Right lower leg: No edema.      Left lower leg: No edema.   Skin:     General: Skin is warm and dry.      Coloration: Skin is not jaundiced.      Findings: No rash.   Neurological:      Mental Status: She is alert and oriented to person, place, and time.      Sensory: Sensation is intact.      Motor: Motor function is intact.      Coordination: Coordination is intact.      Gait: Gait is intact.      Deep Tendon Reflexes: Reflexes are normal and symmetric.   Psychiatric:         Mood and Affect: Mood and affect normal.         Behavior: Behavior normal.         Judgment: Judgment normal.       Physical Exam  Wheezing noted in the lungs.    BMI is within normal parameters. No other follow-up for BMI required.    Procedures    Results      Assessment/Plan:     Diagnoses and all orders for this visit:    1. Depression, unspecified depression type (Primary)    2. Chronic obstructive pulmonary disease, unspecified COPD type  -     Budeson-Glycopyrrol-Formoterol (BREZTRI) 160-9-4.8 MCG/ACT aerosol inhaler; Inhale 2 puffs 2 (Two) Times a Day.  Dispense: 10.7 g; Refill: 11  -     CBC Auto Differential; Standing  -     Comprehensive Metabolic Panel; Standing  -     Hemoglobin A1c; Standing  -     Lipid Panel; Standing  -     MicroAlbumin, Urine, Random - Urine, Clean Catch; Standing  -     Urinalysis With Culture If Indicated -; Standing  -     TSH; Standing  -     Vitamin B6; Future  -     Vitamin B12; Future  -     Vitamin D 25 hydroxy; Future    3. Type 2 diabetes mellitus with hyperglycemia, without long-term current use of insulin  -     CBC  Auto Differential; Standing  -     Comprehensive Metabolic Panel; Standing  -     Hemoglobin A1c; Standing  -     Lipid Panel; Standing  -     MicroAlbumin, Urine, Random - Urine, Clean Catch; Standing  -     Urinalysis With Culture If Indicated -; Standing  -     TSH; Standing  -     Vitamin B6; Future  -     Vitamin B12; Future  -     Vitamin D 25 hydroxy; Future    4. Essential hypertension    5. Nocturnal oxygen desaturation    6. Chronic bilateral low back pain, unspecified whether sciatica present    7. Other fatigue  -     CBC Auto Differential; Standing  -     Comprehensive Metabolic Panel; Standing  -     Hemoglobin A1c; Standing  -     Lipid Panel; Standing  -     MicroAlbumin, Urine, Random - Urine, Clean Catch; Standing  -     Urinalysis With Culture If Indicated -; Standing  -     TSH; Standing  -     Vitamin B6; Future  -     Vitamin B12; Future  -     Vitamin D 25 hydroxy; Future    8. Hypothyroidism, unspecified type    9. B12 deficiency  -     Vitamin B6; Future  -     Vitamin B12; Future    10. Vitamin D deficiency  -     Vitamin D 25 hydroxy; Future          An After Visit Summary was printed and given to the patient at discharge.  Return in about 3 months (around 3/17/2025).       Assessment & Plan  1. Depression.  Her depression is stable on Cymbalta 60 mg. She will continue this medication.    2. Anxiety.  She is currently taking Xanax, a fourth of a pill per day, prescribed by her mental health nurse practitioner. She is trying to wean off it due to its association with dementia.    3. Hypoxia.  Her oxygen levels were previously noted to drop during sleep. She was started on 2 L of oxygen at bedtime, and her oxygen levels have since improved. She will continue this therapy.    4. Chronic pain and arthritis.  Her chronic pain and arthritis remain unchanged. She continues to take a muscle relaxer, half a pill at bedtime.    5. Upper respiratory symptoms.  Her upper respiratory symptoms have  improved, but she still experiences coughing and wheezing. A sample of Breztri inhaler will be provided for her to try, with instructions to use it twice daily, 2 puffs each time. A prescription for Breztri has also been sent to the pharmacy.    6. Fatigue.  Her fatigue has slightly improved, potentially due to the oxygen therapy at bedtime.    7. Blood sugar management.  Labs will be rechecked in February to monitor her blood sugar levels.    I spent 35 minutes caring for Dayami on this date of service. This time includes time spent by me in the following activities: preparing for the visit, reviewing tests, performing a medically appropriate examination and/or evaluation, counseling and educating the patient/family/caregiver, documenting information in the medical record, independently interpreting results and communicating that information with the patient/family/caregiver, care coordination, ordering medications, and ordering test(s)     : I have been treating this patient over a continuum addressing both chronic and acute care concerns.     Claudia BORGES 12/17/2024   Electronically signed.    Patient or patient representative verbalized consent for the use of Ambient Listening during the visit with  JONY Kaur for chart documentation. 12/17/2024  19:05 CST

## 2025-01-08 DIAGNOSIS — G62.9 NEUROPATHY: ICD-10-CM

## 2025-01-08 RX ORDER — HYDROCODONE BITARTRATE AND ACETAMINOPHEN 10; 325 MG/1; MG/1
1 TABLET ORAL EVERY 8 HOURS PRN
Qty: 90 TABLET | Refills: 0 | Status: SHIPPED | OUTPATIENT
Start: 2025-01-08

## 2025-01-08 RX ORDER — GABAPENTIN 600 MG/1
600 TABLET ORAL 3 TIMES DAILY
Qty: 90 TABLET | Refills: 0 | Status: SHIPPED | OUTPATIENT
Start: 2025-01-08

## 2025-01-08 NOTE — TELEPHONE ENCOUNTER
Rx Refill Note  Requested Prescriptions     Pending Prescriptions Disp Refills    gabapentin (NEURONTIN) 600 MG tablet 90 tablet 0     Sig: Take 1 tablet by mouth 3 (Three) Times a Day.      Last office visit with prescribing clinician: 12/17/2024   Last telemedicine visit with prescribing clinician: Visit date not found   Next office visit with prescribing clinician: 3/18/2025                         Would you like a call back once the refill request has been completed: [] Yes [] No    If the office needs to give you a call back, can they leave a voicemail: [] Yes [] No    Kenia Morales MA  01/08/25, 08:47 CST

## 2025-01-08 NOTE — TELEPHONE ENCOUNTER
Rx Refill Note  Requested Prescriptions     Pending Prescriptions Disp Refills    gabapentin (NEURONTIN) 600 MG tablet 90 tablet 0     Sig: Take 1 tablet by mouth 3 (Three) Times a Day.    HYDROcodone-acetaminophen (NORCO)  MG per tablet 90 tablet 0     Sig: Take 1 tablet by mouth Every 8 (Eight) Hours As Needed for Moderate Pain.      Last office visit with prescribing clinician: 12/17/2024   Last telemedicine visit with prescribing clinician: Visit date not found   Next office visit with prescribing clinician: 3/18/2025                         Would you like a call back once the refill request has been completed: [] Yes [] No    If the office needs to give you a call back, can they leave a voicemail: [] Yes [] No    Kenia Morales MA  01/08/25, 08:47 CST

## 2025-01-09 ENCOUNTER — TELEPHONE (OUTPATIENT)
Dept: HEMATOLOGY | Age: 70
End: 2025-01-09

## 2025-01-09 NOTE — TELEPHONE ENCOUNTER
Called Patient and reminded patient of their appointment on 01/14/2025and patient confirmed they would be here.

## 2025-01-12 DIAGNOSIS — M54.50 CHRONIC BILATERAL LOW BACK PAIN, UNSPECIFIED WHETHER SCIATICA PRESENT: ICD-10-CM

## 2025-01-12 DIAGNOSIS — G89.29 CHRONIC BILATERAL LOW BACK PAIN, UNSPECIFIED WHETHER SCIATICA PRESENT: ICD-10-CM

## 2025-02-07 DIAGNOSIS — G62.9 NEUROPATHY: ICD-10-CM

## 2025-02-07 NOTE — TELEPHONE ENCOUNTER
Rx Refill Note  Requested Prescriptions     Pending Prescriptions Disp Refills    gabapentin (NEURONTIN) 600 MG tablet 90 tablet 0     Sig: Take 1 tablet by mouth 3 (Three) Times a Day.      Last office visit with prescribing clinician: 12/17/2024   Last telemedicine visit with prescribing clinician: Visit date not found   Next office visit with prescribing clinician: 3/18/2025                         Would you like a call back once the refill request has been completed: [] Yes [] No    If the office needs to give you a call back, can they leave a voicemail: [] Yes [] No    Kenia Morales MA  02/07/25, 11:27 CST

## 2025-02-07 NOTE — TELEPHONE ENCOUNTER
Rx Refill Note  Requested Prescriptions     Pending Prescriptions Disp Refills    HYDROcodone-acetaminophen (NORCO)  MG per tablet 90 tablet 0     Sig: Take 1 tablet by mouth Every 8 (Eight) Hours As Needed for Moderate Pain.      Last office visit with prescribing clinician: 12/17/2024   Last telemedicine visit with prescribing clinician: Visit date not found   Next office visit with prescribing clinician: 3/18/2025                         Would you like a call back once the refill request has been completed: [] Yes [] No    If the office needs to give you a call back, can they leave a voicemail: [] Yes [] No    Kenia Morales MA  02/07/25, 11:37 CST

## 2025-02-08 RX ORDER — GABAPENTIN 600 MG/1
600 TABLET ORAL 3 TIMES DAILY
Qty: 90 TABLET | Refills: 0 | Status: SHIPPED | OUTPATIENT
Start: 2025-02-08

## 2025-02-08 RX ORDER — HYDROCODONE BITARTRATE AND ACETAMINOPHEN 10; 325 MG/1; MG/1
1 TABLET ORAL EVERY 8 HOURS PRN
Qty: 90 TABLET | Refills: 0 | Status: SHIPPED | OUTPATIENT
Start: 2025-02-08

## 2025-02-20 ENCOUNTER — TELEPHONE (OUTPATIENT)
Dept: HEMATOLOGY | Age: 70
End: 2025-02-20

## 2025-02-20 NOTE — TELEPHONE ENCOUNTER
Called Patient and reminded patient of their appointment on 02/25/2025 and patient phone was not taking calls at this time. Will try other numbers in chart.  Called pt. to remind them of appointment on 02/25/2025 and had to leave a detailed voicemail with appointment date and time. Let pt know they are seeing Michael in [facility] due to  no longer traveling. Let pt know that the provider needs them to come in a day prior to appt to get labs drawn to insure they are back in time for appt. Reminded pt to eat well and be well hydrated for their labs to make labs easier to draw. Gave pt office number to call if they can not make appt or has any question.

## 2025-02-24 DIAGNOSIS — M85.852 OSTEOPENIA OF LEFT HIP: Primary | ICD-10-CM

## 2025-02-24 NOTE — PROGRESS NOTES
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 Tara is presently taking adjuvant endocrine therapy with Femara for an anticipated total of 10 years dosing, anticipate completion December 2025. She presents today in routine 6 month follow-up and evaluation of tolerance to Femara. Tara reports tolerating treatment with complaints of mild hot flashes and stable arthralgias secondary to the Femara.       History of Present Illness  The patient presents for evaluation of breast cancer, osteopenia, and smoking.    Her most recent mammogram was performed in October 2024. She reports no new palpable masses on her chest wall. She has been informed that she is approaching the 10-year mj of her Femara treatment and has been advised to continue the medication. She has a sufficient supply of Femara refills, which are covered by her insurance. She typically receives a 3-month supply of Femara at each refill. She is currently on Femara, a medication she has been taking for approximately 10 years.    She has not been prescribed any medications to prevent osteoporosis. She reports no gastrointestinal issues such as indigestion or reflux. She has full upper dentures. Her current regimen includes weekly vitamin D and daily calcium supplements. She has not taken Fosamax or similar medications in

## 2025-02-25 ENCOUNTER — OFFICE VISIT (OUTPATIENT)
Dept: HEMATOLOGY | Age: 70
End: 2025-02-25
Payer: MEDICARE

## 2025-02-25 VITALS
WEIGHT: 98 LBS | HEART RATE: 103 BPM | OXYGEN SATURATION: 93 % | DIASTOLIC BLOOD PRESSURE: 50 MMHG | SYSTOLIC BLOOD PRESSURE: 120 MMHG | BODY MASS INDEX: 19.79 KG/M2

## 2025-02-25 DIAGNOSIS — D72.828 NEUTROPHILIC LEUKOCYTOSIS: ICD-10-CM

## 2025-02-25 DIAGNOSIS — M85.852 OSTEOPENIA OF LEFT HIP: Primary | ICD-10-CM

## 2025-02-25 DIAGNOSIS — C50.211 MALIGNANT NEOPLASM OF UPPER-INNER QUADRANT OF RIGHT BREAST IN FEMALE, ESTROGEN RECEPTOR POSITIVE (HCC): Primary | ICD-10-CM

## 2025-02-25 DIAGNOSIS — Z51.81 ENCOUNTER FOR MONITORING AROMATASE INHIBITOR THERAPY: ICD-10-CM

## 2025-02-25 DIAGNOSIS — F17.200 CURRENT SMOKER: ICD-10-CM

## 2025-02-25 DIAGNOSIS — C50.211 MALIGNANT NEOPLASM OF UPPER-INNER QUADRANT OF RIGHT BREAST IN FEMALE, ESTROGEN RECEPTOR POSITIVE (HCC): ICD-10-CM

## 2025-02-25 DIAGNOSIS — Z17.0 MALIGNANT NEOPLASM OF UPPER-INNER QUADRANT OF RIGHT BREAST IN FEMALE, ESTROGEN RECEPTOR POSITIVE (HCC): ICD-10-CM

## 2025-02-25 DIAGNOSIS — Z17.0 MALIGNANT NEOPLASM OF UPPER-INNER QUADRANT OF RIGHT BREAST IN FEMALE, ESTROGEN RECEPTOR POSITIVE (HCC): Primary | ICD-10-CM

## 2025-02-25 DIAGNOSIS — Z79.811 ENCOUNTER FOR MONITORING AROMATASE INHIBITOR THERAPY: ICD-10-CM

## 2025-02-25 DIAGNOSIS — R97.8 ABNORMAL TUMOR MARKERS: ICD-10-CM

## 2025-02-25 PROCEDURE — G2211 COMPLEX E/M VISIT ADD ON: HCPCS | Performed by: PHYSICIAN ASSISTANT

## 2025-02-25 PROCEDURE — 99214 OFFICE O/P EST MOD 30 MIN: CPT | Performed by: PHYSICIAN ASSISTANT

## 2025-02-25 PROCEDURE — 1123F ACP DISCUSS/DSCN MKR DOCD: CPT | Performed by: PHYSICIAN ASSISTANT

## 2025-02-25 PROCEDURE — 1159F MED LIST DOCD IN RCRD: CPT | Performed by: PHYSICIAN ASSISTANT

## 2025-02-25 ASSESSMENT — ENCOUNTER SYMPTOMS
TROUBLE SWALLOWING: 0
EYE DISCHARGE: 0
WHEEZING: 0
CONSTIPATION: 0
VOMITING: 0
COUGH: 0
ABDOMINAL PAIN: 0
BLOOD IN STOOL: 0
DIARRHEA: 0
EYE ITCHING: 0
COLOR CHANGE: 0
NAUSEA: 0
ABDOMINAL DISTENTION: 0
PHOTOPHOBIA: 0
SORE THROAT: 0
SHORTNESS OF BREATH: 1
VOICE CHANGE: 0
BACK PAIN: 1

## 2025-02-26 ENCOUNTER — TRANSCRIBE ORDERS (OUTPATIENT)
Dept: ADMINISTRATIVE | Facility: HOSPITAL | Age: 70
End: 2025-02-26
Payer: MEDICARE

## 2025-02-26 DIAGNOSIS — Z79.811 ENCOUNTER FOR MONITORING AROMATASE INHIBITOR THERAPY: Primary | ICD-10-CM

## 2025-02-26 DIAGNOSIS — M85.852 OSTEOPENIA OF LEFT HIP: ICD-10-CM

## 2025-02-26 DIAGNOSIS — Z51.81 ENCOUNTER FOR MONITORING AROMATASE INHIBITOR THERAPY: Primary | ICD-10-CM

## 2025-03-08 DIAGNOSIS — I10 ESSENTIAL HYPERTENSION: ICD-10-CM

## 2025-03-10 DIAGNOSIS — G62.9 NEUROPATHY: ICD-10-CM

## 2025-03-10 DIAGNOSIS — J44.1 COPD WITH EXACERBATION: ICD-10-CM

## 2025-03-10 RX ORDER — SIMVASTATIN 10 MG
10 TABLET ORAL
Qty: 90 TABLET | Refills: 2 | Status: SHIPPED | OUTPATIENT
Start: 2025-03-10

## 2025-03-10 RX ORDER — GUAIFENESIN 600 MG/1
600 TABLET, EXTENDED RELEASE ORAL 2 TIMES DAILY
Qty: 60 TABLET | Refills: 11 | Status: SHIPPED | OUTPATIENT
Start: 2025-03-10

## 2025-03-10 NOTE — TELEPHONE ENCOUNTER
Rx Refill Note  Requested Prescriptions     Pending Prescriptions Disp Refills    HYDROcodone-acetaminophen (NORCO)  MG per tablet 90 tablet 0     Sig: Take 1 tablet by mouth Every 8 (Eight) Hours As Needed for Moderate Pain.      Last office visit with prescribing clinician: 12/17/2024   Last telemedicine visit with prescribing clinician: Visit date not found   Next office visit with prescribing clinician: 3/18/2025                         Would you like a call back once the refill request has been completed: [] Yes [] No    If the office needs to give you a call back, can they leave a voicemail: [] Yes [] No    Kenia Morales MA  03/10/25, 08:22 CDT

## 2025-03-11 RX ORDER — GABAPENTIN 600 MG/1
600 TABLET ORAL 3 TIMES DAILY
Qty: 90 TABLET | Refills: 0 | Status: SHIPPED | OUTPATIENT
Start: 2025-03-11

## 2025-03-11 RX ORDER — HYDROCODONE BITARTRATE AND ACETAMINOPHEN 10; 325 MG/1; MG/1
1 TABLET ORAL EVERY 8 HOURS PRN
Qty: 90 TABLET | Refills: 0 | Status: SHIPPED | OUTPATIENT
Start: 2025-03-11

## 2025-03-18 ENCOUNTER — OFFICE VISIT (OUTPATIENT)
Dept: FAMILY MEDICINE CLINIC | Facility: CLINIC | Age: 70
End: 2025-03-18
Payer: MEDICARE

## 2025-03-18 VITALS
BODY MASS INDEX: 19.56 KG/M2 | RESPIRATION RATE: 20 BRPM | DIASTOLIC BLOOD PRESSURE: 60 MMHG | SYSTOLIC BLOOD PRESSURE: 100 MMHG | HEART RATE: 92 BPM | OXYGEN SATURATION: 97 % | TEMPERATURE: 97.4 F | WEIGHT: 97 LBS | HEIGHT: 59 IN

## 2025-03-18 DIAGNOSIS — R09.02 HYPOXIA: ICD-10-CM

## 2025-03-18 DIAGNOSIS — I10 ESSENTIAL HYPERTENSION: Primary | ICD-10-CM

## 2025-03-18 DIAGNOSIS — F32.A DEPRESSION, UNSPECIFIED DEPRESSION TYPE: ICD-10-CM

## 2025-03-18 DIAGNOSIS — E55.9 VITAMIN D DEFICIENCY: ICD-10-CM

## 2025-03-18 DIAGNOSIS — J44.9 CHRONIC OBSTRUCTIVE PULMONARY DISEASE, UNSPECIFIED COPD TYPE: ICD-10-CM

## 2025-03-18 DIAGNOSIS — R53.83 OTHER FATIGUE: ICD-10-CM

## 2025-03-18 DIAGNOSIS — E53.8 B12 DEFICIENCY: ICD-10-CM

## 2025-03-18 RX ORDER — LETROZOLE 2.5 MG/1
2.5 TABLET, FILM COATED ORAL DAILY
Qty: 90 TABLET | Refills: 4 | Status: SHIPPED | OUTPATIENT
Start: 2025-03-18

## 2025-03-18 NOTE — PROGRESS NOTES
JONY Kaur  [facility]   Family Medicine  [address]  [place] [address]  Phone: [phone]  Fax: [phone]         Chief Complaint:  Chief Complaint   Patient presents with    Depression     3-month follow up        History:  Dayami Rossi is a 69 y.o. female.  History of Present Illness  The patient presents for evaluation of chronic obstructive pulmonary disease, vitamin D deficiency, and medication management.    COPD: She has been responding positively to Breztri, which she believes is enhancing her overall health. Additionally, she utilizes oxygen therapy at night, which she perceives as beneficial in improving her daytime well-being. She has also reduced her smoking habit and has been intermittently using Mucinex as advised.    Fatigue, nocturnal hypoxia: Patient reports improvement in overall fatigue since she has started wearing O2 at 2 L at bedtime.    B12 deficiency: She is currently receiving B12 injections, which she reports as being effective.    Medication management: She is seeking refills for her vitamin D2 and letrozole medications.      SOCIAL HISTORY  She has cut back on smoking.    MEDICATIONS  Breztri, vitamin D2, letrozole, gabapentin, Mucinex           ROS:  Review of Systems   Constitutional:  Negative for chills, diaphoresis, fatigue and fever.   HENT:  Negative for congestion and sore throat.    Respiratory:  Negative for cough.    Cardiovascular:  Negative for chest pain.   Gastrointestinal:  Negative for abdominal pain, nausea and vomiting.   Genitourinary:  Negative for dysuria.   Musculoskeletal:  Negative for myalgias and neck pain.   Skin:  Negative for rash.   Neurological:  Negative for weakness, numbness and headaches.        reports that she has been smoking cigarettes. She has a 22.5 pack-year smoking history. She has never used smokeless tobacco. She reports that she does not drink alcohol and does not use drugs.    Current  "Outpatient Medications   Medication Instructions    albuterol (PROVENTIL HFA;VENTOLIN HFA) 108 (90 Base) MCG/ACT inhaler 2 puffs, Every 4 Hours PRN    ALPRAZolam (XANAX) 1 mg, 2 Times Daily PRN    amLODIPine (NORVASC) 5 mg, Oral, Daily    Budeson-Glycopyrrol-Formoterol (BREZTRI) 160-9-4.8 MCG/ACT aerosol inhaler 2 puffs, Inhalation, 2 Times Daily    Calcium Citrate-Vitamin D (CALCIUM + D PO) 1 tablet, Daily    cyanocobalamin 1000 MCG/ML injection 1ml weekly x 6 weeks, then every other week x 1 month, then monthly.    DULoxetine (CYMBALTA) 60 mg, Oral, Daily    empagliflozin (JARDIANCE) 10 mg    gabapentin (NEURONTIN) 600 mg, Oral, 3 Times Daily    guaiFENesin (MUCINEX) 600 mg, Oral, 2 Times Daily    HYDROcodone-acetaminophen (NORCO)  MG per tablet 1 tablet, Oral, Every 8 Hours PRN    letrozole (FEMARA) 2.5 mg, Oral, Daily    levothyroxine (SYNTHROID, LEVOTHROID) 100 mcg, Oral, Daily    lisinopril (PRINIVIL,ZESTRIL) 20 mg, Oral, Daily    metFORMIN ER (GLUCOPHAGE-XR) 500 mg, Oral, Daily With Breakfast    multivitamin with minerals 1 tablet/day    potassium chloride (K-DUR) 10 MEQ CR tablet 10 mEq, Daily    simvastatin (ZOCOR) 10 mg, Oral, Every Night at Bedtime    tiZANidine (ZANAFLEX) 4 MG tablet TAKE 1/2 - 1 TABLET AT BEDTIME. DO NOT DRIVE AFTER TAKING.    traZODone (DESYREL) 100 MG tablet TAKE 1-3 TAB BY MOUTH EVERY DAY AT BEDTIME AS NEEDED FOR SLEEP       OBJECTIVE:  /60 (BP Location: Left arm, Patient Position: Sitting, Cuff Size: Adult)   Pulse 92   Temp 97.4 °F (36.3 °C) (Infrared)   Resp 20   Ht 149.9 cm (59.02\")   Wt 44 kg (97 lb)   LMP  (LMP Unknown)   SpO2 97%   BMI 19.58 kg/m²    Physical Exam  Vitals and nursing note reviewed.   Constitutional:       Appearance: Normal appearance. She is well-developed.   HENT:      Head: Normocephalic and atraumatic.      Right Ear: Tympanic membrane, ear canal and external ear normal.      Left Ear: Tympanic membrane, ear canal and external ear " normal.      Nose: Nose normal. No septal deviation, nasal tenderness or congestion.      Mouth/Throat:      Lips: Pink. No lesions.      Mouth: Mucous membranes are moist. No oral lesions.      Dentition: Normal dentition.      Pharynx: Oropharynx is clear. No pharyngeal swelling, oropharyngeal exudate or posterior oropharyngeal erythema.   Eyes:      General: Lids are normal. Vision grossly intact. No scleral icterus.        Right eye: No discharge.         Left eye: No discharge.      Extraocular Movements: Extraocular movements intact.      Conjunctiva/sclera: Conjunctivae normal.      Right eye: Right conjunctiva is not injected.      Left eye: Left conjunctiva is not injected.      Pupils: Pupils are equal, round, and reactive to light.   Neck:      Thyroid: No thyroid mass.      Trachea: Trachea normal.   Cardiovascular:      Rate and Rhythm: Normal rate and regular rhythm.      Heart sounds: Normal heart sounds. No murmur heard.     No gallop.   Pulmonary:      Effort: Pulmonary effort is normal.      Breath sounds: Normal breath sounds and air entry. No wheezing, rhonchi or rales.   Musculoskeletal:         General: No tenderness or deformity. Normal range of motion.      Cervical back: Full passive range of motion without pain, normal range of motion and neck supple.      Thoracic back: Normal.      Right lower leg: No edema.      Left lower leg: No edema.   Skin:     General: Skin is warm and dry.      Coloration: Skin is not jaundiced.      Findings: No rash.   Neurological:      Mental Status: She is alert and oriented to person, place, and time.      Sensory: Sensation is intact.      Motor: Motor function is intact.      Coordination: Coordination is intact.      Gait: Gait is intact.      Deep Tendon Reflexes: Reflexes are normal and symmetric.   Psychiatric:         Mood and Affect: Mood and affect normal.         Behavior: Behavior normal.         Judgment: Judgment normal.       Physical  Exam  Lungs sound much better.    BMI is within normal parameters. No other follow-up for BMI required.    Procedures    Results  Laboratory Studies  Oxygen levels were normal.    Assessment/Plan:     Diagnoses and all orders for this visit:    1. Essential hypertension (Primary)    2. Depression, unspecified depression type    3. Chronic obstructive pulmonary disease, unspecified COPD type    4. B12 deficiency    5. Vitamin D deficiency    6. Other fatigue    7. Hypoxia    Other orders  -     letrozole (FEMARA) 2.5 MG tablet; Take 1 tablet by mouth Daily.  Dispense: 90 tablet; Refill: 4          An After Visit Summary was printed and given to the patient at discharge.  Return in about 3 months (around 6/18/2025).       Assessment & Plan  1. Chronic Obstructive Pulmonary Disease.  Her pulmonary function has shown significant improvement. She is currently using Breztri and reports doing better on it. She is also using oxygen at night, which helps her feel better during the day. She has been advised to continue with her current regimen of Breztri and nocturnal oxygen therapy.    2. Vitamin D Deficiency.  A prescription refill for vitamin D2 has been provided.    3. Medication Management.  A medication contract for gabapentin has been signed today.    4. Health Maintenance.  She has a bone density test scheduled for April 2025. Her mammogram is due in October 2025.    Follow-up  The patient will follow up in 3 months.    I spent 33 minutes caring for Dayami on this date of service. This time includes time spent by me in the following activities: preparing for the visit, reviewing tests, performing a medically appropriate examination and/or evaluation, counseling and educating the patient/family/caregiver, documenting information in the medical record, independently interpreting results and communicating that information with the patient/family/caregiver, care coordination, and ordering medications     \: I have  been treating this patient over a continuum addressing both chronic and acute care concerns.     Claudia BORGES 3/21/2025   Electronically signed.    Patient or patient representative verbalized consent for the use of Ambient Listening during the visit with  JONY Kaur for chart documentation. 3/21/2025  21:11 CDT

## 2025-03-19 NOTE — TELEPHONE ENCOUNTER
Rx Refill Note  Requested Prescriptions     Pending Prescriptions Disp Refills    potassium chloride 10 MEQ CR tablet       Sig: Take 1 tablet by mouth Daily.      Last office visit with prescribing clinician: 3/18/2025   Last telemedicine visit with prescribing clinician: Visit date not found   Next office visit with prescribing clinician: 6/18/2025                         Would you like a call back once the refill request has been completed: [] Yes [] No    If the office needs to give you a call back, can they leave a voicemail: [] Yes [] No    Kenia Morales MA  03/19/25, 13:20 CDT

## 2025-03-26 ENCOUNTER — TELEPHONE (OUTPATIENT)
Dept: FAMILY MEDICINE CLINIC | Facility: CLINIC | Age: 70
End: 2025-03-26
Payer: MEDICARE

## 2025-03-26 RX ORDER — POTASSIUM CHLORIDE 750 MG/1
10 TABLET, EXTENDED RELEASE ORAL DAILY
Qty: 90 TABLET | Refills: 0 | Status: SHIPPED | OUTPATIENT
Start: 2025-03-26

## 2025-03-26 RX ORDER — ERGOCALCIFEROL 1.25 MG/1
50000 CAPSULE ORAL WEEKLY
Qty: 30 CAPSULE | Refills: 1 | Status: SHIPPED | OUTPATIENT
Start: 2025-03-26

## 2025-03-26 RX ORDER — POTASSIUM CHLORIDE 750 MG/1
10 TABLET, EXTENDED RELEASE ORAL DAILY
OUTPATIENT
Start: 2025-03-26

## 2025-03-26 NOTE — TELEPHONE ENCOUNTER
Rx Refill Note  Requested Prescriptions     Pending Prescriptions Disp Refills    potassium chloride 10 MEQ CR tablet 90 tablet 0     Sig: Take 1 tablet by mouth Daily.    Vitamin D, Ergocalciferol, 08114 units capsule 30 capsule 1     Sig: Take 1 capsule by mouth 1 (One) Time Per Week.      Last office visit with prescribing clinician: 3/18/2025   Last telemedicine visit with prescribing clinician: Visit date not found   Next office visit with prescribing clinician: 6/18/2025                         Would you like a call back once the refill request has been completed: [] Yes [] No    If the office needs to give you a call back, can they leave a voicemail: [] Yes [] No    Kenia Morales MA  03/26/25, 14:48 CDT

## 2025-04-09 DIAGNOSIS — G62.9 NEUROPATHY: ICD-10-CM

## 2025-04-09 RX ORDER — HYDROCODONE BITARTRATE AND ACETAMINOPHEN 10; 325 MG/1; MG/1
1 TABLET ORAL EVERY 8 HOURS PRN
Qty: 90 TABLET | Refills: 0 | OUTPATIENT
Start: 2025-04-09

## 2025-04-09 RX ORDER — GABAPENTIN 600 MG/1
600 TABLET ORAL 3 TIMES DAILY
Qty: 90 TABLET | Refills: 0 | OUTPATIENT
Start: 2025-04-09

## 2025-04-11 ENCOUNTER — TELEPHONE (OUTPATIENT)
Dept: FAMILY MEDICINE CLINIC | Facility: CLINIC | Age: 70
End: 2025-04-11
Payer: MEDICARE

## 2025-04-11 DIAGNOSIS — G62.9 NEUROPATHY: ICD-10-CM

## 2025-04-11 NOTE — TELEPHONE ENCOUNTER
Caller: Dayami Rossi    Relationship to patient: Self    Best call back number: [phone] (     Patient is needing: PT IS CALLING TO GET A STATUS UPDATE ON MEDICATIONS SHE REQUESTED ON 4/9/2025. SHE IS ASKING IF SOMEONE FROM THE OFFICE CAN CALL HER  WHEN THIS HAS BEEN SENT OVER.

## 2025-04-14 RX ORDER — GABAPENTIN 600 MG/1
600 TABLET ORAL 3 TIMES DAILY
Qty: 90 TABLET | Refills: 0 | Status: SHIPPED | OUTPATIENT
Start: 2025-04-14

## 2025-04-14 RX ORDER — HYDROCODONE BITARTRATE AND ACETAMINOPHEN 10; 325 MG/1; MG/1
1 TABLET ORAL EVERY 8 HOURS PRN
Qty: 90 TABLET | Refills: 0 | Status: SHIPPED | OUTPATIENT
Start: 2025-04-14

## 2025-05-12 DIAGNOSIS — G62.9 NEUROPATHY: ICD-10-CM

## 2025-05-12 NOTE — TELEPHONE ENCOUNTER
Rx Refill Note  Requested Prescriptions     Pending Prescriptions Disp Refills    gabapentin (NEURONTIN) 600 MG tablet 90 tablet 0     Sig: Take 1 tablet by mouth 3 (Three) Times a Day.    HYDROcodone-acetaminophen (NORCO)  MG per tablet 90 tablet 0     Sig: Take 1 tablet by mouth Every 8 (Eight) Hours As Needed for Moderate Pain.      Last office visit with prescribing clinician: Visit date not found   Last telemedicine visit with prescribing clinician: Visit date not found   Next office visit with prescribing clinician: Visit date not found                         Would you like a call back once the refill request has been completed: [] Yes [] No    If the office needs to give you a call back, can they leave a voicemail: [] Yes [] No    Kenia Morales MA  05/12/25, 16:15 CDT

## 2025-05-13 ENCOUNTER — TELEPHONE (OUTPATIENT)
Dept: HEMATOLOGY | Age: 70
End: 2025-05-13

## 2025-05-15 DIAGNOSIS — G62.9 NEUROPATHY: ICD-10-CM

## 2025-05-15 RX ORDER — GABAPENTIN 600 MG/1
600 TABLET ORAL 3 TIMES DAILY
Qty: 90 TABLET | Refills: 0 | Status: SHIPPED | OUTPATIENT
Start: 2025-05-15

## 2025-05-15 RX ORDER — HYDROCODONE BITARTRATE AND ACETAMINOPHEN 10; 325 MG/1; MG/1
1 TABLET ORAL EVERY 8 HOURS PRN
Qty: 90 TABLET | Refills: 0 | Status: SHIPPED | OUTPATIENT
Start: 2025-05-15

## 2025-05-15 RX ORDER — HYDROCODONE BITARTRATE AND ACETAMINOPHEN 10; 325 MG/1; MG/1
1 TABLET ORAL EVERY 8 HOURS PRN
Qty: 90 TABLET | Refills: 0 | OUTPATIENT
Start: 2025-05-15

## 2025-05-15 RX ORDER — GABAPENTIN 600 MG/1
600 TABLET ORAL 3 TIMES DAILY
Qty: 90 TABLET | Refills: 0 | OUTPATIENT
Start: 2025-05-15

## 2025-06-06 DIAGNOSIS — I10 ESSENTIAL HYPERTENSION: ICD-10-CM

## 2025-06-06 RX ORDER — LISINOPRIL 20 MG/1
20 TABLET ORAL DAILY
Qty: 90 TABLET | Refills: 2 | Status: SHIPPED | OUTPATIENT
Start: 2025-06-06

## 2025-06-12 DIAGNOSIS — G62.9 NEUROPATHY: ICD-10-CM

## 2025-06-12 RX ORDER — HYDROCODONE BITARTRATE AND ACETAMINOPHEN 10; 325 MG/1; MG/1
1 TABLET ORAL EVERY 8 HOURS PRN
Qty: 90 TABLET | Refills: 0 | Status: SHIPPED | OUTPATIENT
Start: 2025-06-12

## 2025-06-12 RX ORDER — GABAPENTIN 600 MG/1
600 TABLET ORAL 3 TIMES DAILY
Qty: 90 TABLET | Refills: 0 | Status: SHIPPED | OUTPATIENT
Start: 2025-06-12

## 2025-06-12 NOTE — TELEPHONE ENCOUNTER
Rx Refill Note  Requested Prescriptions     Pending Prescriptions Disp Refills    HYDROcodone-acetaminophen (NORCO)  MG per tablet 90 tablet 0     Sig: Take 1 tablet by mouth Every 8 (Eight) Hours As Needed for Moderate Pain.      Last office visit with prescribing clinician: 3/18/2025   Last telemedicine visit with prescribing clinician: Visit date not found   Next office visit with prescribing clinician: 6/18/2025                         Would you like a call back once the refill request has been completed: [] Yes [] No    If the office needs to give you a call back, can they leave a voicemail: [] Yes [] No    Kenia Morales MA  06/12/25, 16:08 CDT  CONTROLLED SUBSTANCE AGREEMENT - SCAN - [facility]- CONTROLLED SUBSTANCE AGREEMENT 2025 (03/18/2025)

## 2025-06-18 ENCOUNTER — OFFICE VISIT (OUTPATIENT)
Dept: FAMILY MEDICINE CLINIC | Facility: CLINIC | Age: 70
End: 2025-06-18
Payer: MEDICARE

## 2025-06-18 VITALS
SYSTOLIC BLOOD PRESSURE: 111 MMHG | DIASTOLIC BLOOD PRESSURE: 62 MMHG | WEIGHT: 95.25 LBS | RESPIRATION RATE: 20 BRPM | BODY MASS INDEX: 19.2 KG/M2 | HEIGHT: 59 IN | OXYGEN SATURATION: 96 % | TEMPERATURE: 97.6 F | HEART RATE: 95 BPM

## 2025-06-18 DIAGNOSIS — J44.1 COPD WITH EXACERBATION: ICD-10-CM

## 2025-06-18 DIAGNOSIS — M54.40 LOW BACK PAIN WITH SCIATICA, SCIATICA LATERALITY UNSPECIFIED, UNSPECIFIED BACK PAIN LATERALITY, UNSPECIFIED CHRONICITY: Primary | ICD-10-CM

## 2025-06-18 LAB
25(OH)D3+25(OH)D2 SERPL-MCNC: 42.6 NG/ML (ref 30–100)
ALBUMIN SERPL-MCNC: 4.4 G/DL (ref 3.8–4.8)
ALP SERPL-CCNC: 85 IU/L (ref 44–121)
ALT SERPL-CCNC: 11 IU/L (ref 0–32)
APPEARANCE UR: CLEAR
AST SERPL-CCNC: 19 IU/L (ref 0–40)
BACTERIA #/AREA URNS HPF: NORMAL /[HPF]
BASOPHILS # BLD AUTO: 0.1 X10E3/UL (ref 0–0.2)
BASOPHILS NFR BLD AUTO: 1 %
BILIRUB SERPL-MCNC: <0.2 MG/DL (ref 0–1.2)
BILIRUB UR QL STRIP: NEGATIVE
BUN SERPL-MCNC: 19 MG/DL (ref 8–27)
BUN/CREAT SERPL: 22 (ref 12–28)
CALCIUM SERPL-MCNC: 9 MG/DL (ref 8.7–10.3)
CASTS URNS QL MICRO: NORMAL /LPF
CHLORIDE SERPL-SCNC: 101 MMOL/L (ref 96–106)
CHOLEST SERPL-MCNC: 120 MG/DL (ref 100–199)
CO2 SERPL-SCNC: 20 MMOL/L (ref 20–29)
COLOR UR: YELLOW
CREAT SERPL-MCNC: 0.85 MG/DL (ref 0.57–1)
EGFRCR SERPLBLD CKD-EPI 2021: 73 ML/MIN/1.73
EOSINOPHIL # BLD AUTO: 0.2 X10E3/UL (ref 0–0.4)
EOSINOPHIL NFR BLD AUTO: 2 %
EPI CELLS #/AREA URNS HPF: NORMAL /HPF (ref 0–10)
ERYTHROCYTE [DISTWIDTH] IN BLOOD BY AUTOMATED COUNT: 11.8 % (ref 11.7–15.4)
GLOBULIN SER CALC-MCNC: 2.5 G/DL (ref 1.5–4.5)
GLUCOSE SERPL-MCNC: 132 MG/DL (ref 70–99)
GLUCOSE UR QL STRIP: ABNORMAL
HBA1C MFR BLD: 6.8 % (ref 4.8–5.6)
HCT VFR BLD AUTO: 41.3 % (ref 34–46.6)
HDLC SERPL-MCNC: 51 MG/DL
HGB BLD-MCNC: 13.5 G/DL (ref 11.1–15.9)
HGB UR QL STRIP: NEGATIVE
IMM GRANULOCYTES # BLD AUTO: 0.1 X10E3/UL (ref 0–0.1)
IMM GRANULOCYTES NFR BLD AUTO: 1 %
KETONES UR QL STRIP: NEGATIVE
LDLC SERPL CALC-MCNC: 53 MG/DL (ref 0–99)
LEUKOCYTE ESTERASE UR QL STRIP: NEGATIVE
LYMPHOCYTES # BLD AUTO: 2.7 X10E3/UL (ref 0.7–3.1)
LYMPHOCYTES NFR BLD AUTO: 24 %
MCH RBC QN AUTO: 31.8 PG (ref 26.6–33)
MCHC RBC AUTO-ENTMCNC: 32.7 G/DL (ref 31.5–35.7)
MCV RBC AUTO: 97 FL (ref 79–97)
MICRO URNS: ABNORMAL
MICRO URNS: ABNORMAL
MICROALBUMIN UR-MCNC: <3 UG/ML
MONOCYTES # BLD AUTO: 0.6 X10E3/UL (ref 0.1–0.9)
MONOCYTES NFR BLD AUTO: 6 %
NEUTROPHILS # BLD AUTO: 7.5 X10E3/UL (ref 1.4–7)
NEUTROPHILS NFR BLD AUTO: 66 %
NITRITE UR QL STRIP: NEGATIVE
PH UR STRIP: 5.5 [PH] (ref 5–7.5)
PLATELET # BLD AUTO: 348 X10E3/UL (ref 150–450)
POTASSIUM SERPL-SCNC: 4.6 MMOL/L (ref 3.5–5.2)
PROT SERPL-MCNC: 6.9 G/DL (ref 6–8.5)
PROT UR QL STRIP: NEGATIVE
PYRIDOXAL PHOS SERPL-MCNC: NORMAL UG/L
RBC # BLD AUTO: 4.24 X10E6/UL (ref 3.77–5.28)
RBC #/AREA URNS HPF: NORMAL /HPF (ref 0–2)
SODIUM SERPL-SCNC: 138 MMOL/L (ref 134–144)
SP GR UR STRIP: 1.02 (ref 1–1.03)
SPECIMEN STATUS: NORMAL
TRIGL SERPL-MCNC: 78 MG/DL (ref 0–149)
TSH SERPL DL<=0.005 MIU/L-ACNC: 1.28 UIU/ML (ref 0.45–4.5)
URINALYSIS REFLEX: ABNORMAL
UROBILINOGEN UR STRIP-MCNC: 0.2 MG/DL (ref 0.2–1)
VIT B12 SERPL-MCNC: 381 PG/ML (ref 232–1245)
VLDLC SERPL CALC-MCNC: 16 MG/DL (ref 5–40)
WBC # BLD AUTO: 11.1 X10E3/UL (ref 3.4–10.8)
WBC #/AREA URNS HPF: NORMAL /HPF (ref 0–5)

## 2025-06-18 RX ORDER — AZITHROMYCIN 250 MG/1
TABLET, FILM COATED ORAL
Qty: 6 TABLET | Refills: 0 | Status: SHIPPED | OUTPATIENT
Start: 2025-06-18

## 2025-06-18 RX ORDER — PREDNISONE 20 MG/1
TABLET ORAL
Qty: 35 TABLET | Refills: 0 | Status: SHIPPED | OUTPATIENT
Start: 2025-06-18

## 2025-06-18 NOTE — PROGRESS NOTES
JONY Kaur  [facility]   Family Medicine  [address]  [place] [address]  Phone: [phone]  Fax: [phone]         Chief Complaint:  Chief Complaint   Patient presents with    Essential hypertension     3-month follow up    Results        History:  Dayami Rossi is a 70 y.o. female.  History of Present Illness  The patient presents today for a 3-month follow-up on hypertension, osteopenia, back pain, and respiratory issues.    Hypertension: She has discontinued the use of amlodipine as she no longer perceives a need for it.     Encounter to discuss labs: Laboratory tests were completed     Chronic low back pain: She reports persistent leg pain, which she identifies as her primary concern. She has a history of receiving injections for bursitis and acknowledges the presence of back issues. She recalls undergoing imaging studies at [facility] but does not report any leg weakness. An MRI of the lumbar spine in 2020 showed stenosis of the L4-L5 and L5-S1, as well as arthritis.    Osteopenia: She expresses interest in obtaining the results of her bone density test. She is currently on letrozole to prevent reoccurrence of breast cancer, Hello yes which she believes is contributing to her bone issues. She has been advised to continue this medication to prevent cancer recurrence. She has an upcoming appointment with Dr. Buck on 07/01/2025. She has been on letrozole for nearly a decade and was informed that there is no research available on the long-term effects of this medication beyond 10 years. She is also taking vitamin D once weekly.    Her fatigue has improved. She continues to use Breztri and has used her inhaler today.           ROS:  Review of Systems   Endocrine: Negative for polydipsia and polyuria.   Neurological:  Positive for tremors. Negative for speech difficulty and headaches.   Psychiatric/Behavioral:  Negative for confusion.         reports that  she has been smoking cigarettes. She has a 22.5 pack-year smoking history. She has never used smokeless tobacco. She reports that she does not drink alcohol and does not use drugs.    Current Outpatient Medications   Medication Instructions    albuterol (PROVENTIL HFA;VENTOLIN HFA) 108 (90 Base) MCG/ACT inhaler 2 puffs, Every 4 Hours PRN    ALPRAZolam (XANAX) 1 mg, 2 Times Daily PRN    azithromycin (Zithromax Z-Moses) 250 MG tablet Take 2 tablets by mouth on day 1, then 1 tablet daily on days 2-5    Budeson-Glycopyrrol-Formoterol (BREZTRI) 160-9-4.8 MCG/ACT aerosol inhaler 2 puffs, Inhalation, 2 Times Daily    Calcium Citrate-Vitamin D (CALCIUM + D PO) 1 tablet, Daily    cyanocobalamin 1000 MCG/ML injection 1ml weekly x 6 weeks, then every other week x 1 month, then monthly.    Cyanocobalamin 2,000 mcg, Sublingual, Daily    DULoxetine (CYMBALTA) 60 mg, Oral, Daily    empagliflozin (JARDIANCE) 10 mg    gabapentin (NEURONTIN) 600 mg, Oral, 3 Times Daily    guaiFENesin (MUCINEX) 600 mg, Oral, 2 Times Daily    HYDROcodone-acetaminophen (NORCO)  MG per tablet 1 tablet, Oral, Every 8 Hours PRN    ibandronate (BONIVA) 150 mg, Oral, Every 30 Days    letrozole (FEMARA) 2.5 mg, Oral, Daily    levothyroxine (SYNTHROID, LEVOTHROID) 100 mcg, Oral, Daily    lisinopril (PRINIVIL,ZESTRIL) 20 mg, Oral, Daily    metFORMIN ER (GLUCOPHAGE-XR) 500 mg, Oral, Daily With Breakfast    multivitamin with minerals 1 tablet/day    potassium chloride 10 MEQ CR tablet 10 mEq, Oral, Daily    predniSONE (DELTASONE) 20 MG tablet 1 tab Bid x 7 days, 1.5 tab daily for 7 days, 1 tablet daily for 7 days, 1/2 tablet daily for 7 days.    simvastatin (ZOCOR) 10 mg, Oral, Every Night at Bedtime    tiZANidine (ZANAFLEX) 4 MG tablet TAKE 1/2 - 1 TABLET AT BEDTIME. DO NOT DRIVE AFTER TAKING.    traZODone (DESYREL) 100 MG tablet TAKE 1-3 TAB BY MOUTH EVERY DAY AT BEDTIME AS NEEDED FOR SLEEP    Vitamin D (Ergocalciferol) 50,000 Units, Oral, Weekly  "      OBJECTIVE:  /62 (BP Location: Left arm, Patient Position: Sitting, Cuff Size: Adult)   Pulse 95   Temp 97.6 °F (36.4 °C) (Infrared)   Resp 20   Ht 149.9 cm (59.02\")   Wt 43.2 kg (95 lb 4 oz)   LMP  (LMP Unknown)   SpO2 96%   BMI 19.23 kg/m²    Physical Exam  Vitals and nursing note reviewed.   Constitutional:       Appearance: Normal appearance. She is well-developed.   HENT:      Head: Normocephalic and atraumatic.      Right Ear: Tympanic membrane, ear canal and external ear normal.      Left Ear: Tympanic membrane, ear canal and external ear normal.      Nose: Nose normal. No septal deviation, nasal tenderness or congestion.      Mouth/Throat:      Lips: Pink. No lesions.      Mouth: Mucous membranes are moist. No oral lesions.      Dentition: Normal dentition.      Pharynx: Oropharynx is clear. No pharyngeal swelling, oropharyngeal exudate or posterior oropharyngeal erythema.   Eyes:      General: Lids are normal. Vision grossly intact. No scleral icterus.        Right eye: No discharge.         Left eye: No discharge.      Extraocular Movements: Extraocular movements intact.      Conjunctiva/sclera: Conjunctivae normal.      Right eye: Right conjunctiva is not injected.      Left eye: Left conjunctiva is not injected.      Pupils: Pupils are equal, round, and reactive to light.   Neck:      Thyroid: No thyroid mass.      Trachea: Trachea normal.   Cardiovascular:      Rate and Rhythm: Normal rate and regular rhythm.      Heart sounds: Normal heart sounds. No murmur heard.     No gallop.   Pulmonary:      Effort: Pulmonary effort is normal.      Breath sounds: Normal air entry. Wheezing and rhonchi present. No rales.   Musculoskeletal:         General: No tenderness or deformity. Normal range of motion.      Cervical back: Full passive range of motion without pain, normal range of motion and neck supple.      Thoracic back: Normal.      Right lower leg: No edema.      Left lower leg: No " edema.   Skin:     General: Skin is warm and dry.      Coloration: Skin is not jaundiced.      Findings: No rash.   Neurological:      Mental Status: She is alert and oriented to person, place, and time.      Sensory: Sensation is intact.      Motor: Motor function is intact.      Coordination: Coordination is intact.      Gait: Gait is intact.      Deep Tendon Reflexes: Reflexes are normal and symmetric.   Psychiatric:         Mood and Affect: Mood and affect normal.         Behavior: Behavior normal.         Judgment: Judgment normal.       Physical Exam  Respiratory: Slightly congested    BMI is within normal parameters. No other follow-up for BMI required.    Procedures    Results  Imaging   - MRI of the lumbar spine: 2020, Stenosis of the L4-L5, L5-S1 and some arthritis    Diagnostic Testing   - Bone density test: Osteopenia    Assessment/Plan:     Diagnoses and all orders for this visit:    1. Low back pain with sciatica, sciatica laterality unspecified, unspecified back pain laterality, unspecified chronicity (Primary)  -     XR Spine Lumbar 2 or 3 View; Future    2. COPD with exacerbation  -     azithromycin (Zithromax Z-Moses) 250 MG tablet; Take 2 tablets by mouth on day 1, then 1 tablet daily on days 2-5  Dispense: 6 tablet; Refill: 0  -     predniSONE (DELTASONE) 20 MG tablet; 1 tab Bid x 7 days, 1.5 tab daily for 7 days, 1 tablet daily for 7 days, 1/2 tablet daily for 7 days.  Dispense: 35 tablet; Refill: 0          An After Visit Summary was printed and given to the patient at discharge.  Return in about 3 months (around 9/18/2025).       Assessment & Plan  1. Hypertension.  - Blood pressure is well controlled at 111/62.  - Discontinued amlodipine 5 mg daily as it is no longer needed.  - Blood pressure will be rechecked during the visit to ensure stability.  - No additional medications prescribed for hypertension.    2. Back pain.  - Persistent pain down the leg, potentially related to back issues.  -  "MRI from 2020 showed stenosis at L4-L5 and L5-S1, along with arthritis.  - X-ray of the lumbar spine will be ordered first, followed by an MRI.  - Referral to pain management will be made if necessary.    3. Osteopenia.  - Bone density test indicates osteopenia, likely exacerbated by long-term use of letrozole.  - Continue taking vitamin D 50,000 IU once per week.  - Depending on lab results, calcium 600 mg twice daily with additional vitamin D may be recommended.  - Consultation with Dr. Buck or Dr. Acuna regarding the potential addition of Boniva.     4. Respiratory issues.  - Reports feeling \"junky\" in the lungs and has used her inhaler today.  - Prescribed a round of steroids and antibiotics (Z-Moses) to address respiratory symptoms.  - No known allergies to Zithromax.    Follow-up  - Follow-up in 3 months or sooner if necessary.    I spent 40 minutes caring for Dayami on this date of service. This time includes time spent by me in the following activities: preparing for the visit, reviewing tests, performing a medically appropriate examination and/or evaluation, counseling and educating the patient/family/caregiver, referring and communicating with other health care professionals, documenting information in the medical record, independently interpreting results and communicating that information with the patient/family/caregiver, care coordination, ordering medications, obtaining a separately obtained history, and reviewing a separately obtained history     : I have been treating this patient over a continuum addressing both chronic and acute care concerns.     Claudia BORGES 7/2/2025   Electronically signed.    Patient or patient representative verbalized consent for the use of Ambient Listening during the visit with  JONY Kaur for chart documentation. 7/2/2025  23:11 CDT  "

## 2025-06-18 NOTE — PATIENT INSTRUCTIONS
Look at labs before making determination on vit d.     Taking vit d once per week, may add calcium with vit d twice daily depending on labs.     Reach out to felice or Dr. Acuna to discuss sarita  Appointment July 1st.     Xray lumbar spine and then move forward with MRI of the lumbar spine

## 2025-06-22 DIAGNOSIS — D72.828 NEUTROPHILIC LEUKOCYTOSIS: ICD-10-CM

## 2025-06-22 DIAGNOSIS — C50.211 MALIGNANT NEOPLASM OF UPPER-INNER QUADRANT OF RIGHT BREAST IN FEMALE, ESTROGEN RECEPTOR POSITIVE (HCC): Primary | ICD-10-CM

## 2025-06-22 DIAGNOSIS — Z17.0 MALIGNANT NEOPLASM OF UPPER-INNER QUADRANT OF RIGHT BREAST IN FEMALE, ESTROGEN RECEPTOR POSITIVE (HCC): Primary | ICD-10-CM

## 2025-06-22 DIAGNOSIS — Z51.81 ENCOUNTER FOR MONITORING AROMATASE INHIBITOR THERAPY: ICD-10-CM

## 2025-06-22 DIAGNOSIS — R97.8 ABNORMAL TUMOR MARKERS: ICD-10-CM

## 2025-06-22 DIAGNOSIS — Z79.811 ENCOUNTER FOR MONITORING AROMATASE INHIBITOR THERAPY: ICD-10-CM

## 2025-06-23 RX ORDER — POTASSIUM CHLORIDE 750 MG/1
10 TABLET, EXTENDED RELEASE ORAL DAILY
Qty: 90 TABLET | Refills: 0 | Status: SHIPPED | OUTPATIENT
Start: 2025-06-23

## 2025-06-24 NOTE — PROGRESS NOTES
[facility] Progress Note      Pt Name: Tara Gustafson  YOB: 1955  MRN: [id number]    Date of evaluation: 7/1/2025  History Obtained From:  patient, electronic medical record    CHIEF COMPLAINT:    Chief Complaint   Patient presents with    Follow-up     \"They check my cancer markers\"     Current active problems  Resected stage IIIc right breast carcinoma  Long-term use of aromatase inhibitor  Osteopenia    History of Present Illness  Tara Gustafson is a 70 y.o.  female presents today for follow-up of history of stage IIIc right breast carcinoma in 2015, long-term use aromatase inhibitor, osteopenia and elevated tumor markers.    She has been on Femara for nearly a decade, with the intention to continue indefinitely. A bone density test revealed osteopenia, but not osteoporosis. She is currently taking vitamin D and a monthly Boniva (ibandronate).  She will she is tolerating it well.  There have been no new bone pains reported, although she continues to experience discomfort in her left hip.    She continues to undergo mammograms, with the most recent one conducted in 10/2024. She reports no dental issues. She smokes cigarettes.         ONCOLOGIC HISTORY:    Diagnosis  Invasive ductal carcinoma, August 2015   ER 92%/NE 90%/HER-2/liana Fish positive, Ki-67 22%, grade 3     [id number] (stage IIIC)        Treatment summary  8/20/2015- excisional biopsies positive margins   Right modified radical mastectomy with axillary dissection   She received one cycle TCH-P with grade 4 toxicity and declined further adjuvant chemotherapy   2/11/2016-she completed adjuvant radiation 6040cGy   Herceptin 1 year   Adjuvant endocrine therapy with Femara started December 2015-Dec 2025     Cancer history  Ms Tara Gustafson was first seen by Dr. Rodriguez in on 1/30/2018 referred by Dr. Rico.  8/8/2015-screening mammogram at [facility] showed a 3.6 cm mass at 12 o'clock position.   8/20/2015-excisional biopsy by

## 2025-06-25 ENCOUNTER — TELEPHONE (OUTPATIENT)
Dept: VASCULAR SURGERY | Facility: CLINIC | Age: 70
End: 2025-06-25
Payer: MEDICARE

## 2025-06-25 DIAGNOSIS — M85.80 OSTEOPENIA WITH HIGH RISK OF FRACTURE: Primary | ICD-10-CM

## 2025-06-25 RX ORDER — IBANDRONATE SODIUM 150 MG/1
150 TABLET, FILM COATED ORAL
Qty: 3 TABLET | Refills: 4 | Status: SHIPPED | OUTPATIENT
Start: 2025-06-25

## 2025-06-25 NOTE — TELEPHONE ENCOUNTER
Call placed to patient with no answer, voicemail left requesting call back to reschedule missed appointment

## 2025-06-26 ENCOUNTER — TELEPHONE (OUTPATIENT)
Dept: HEMATOLOGY | Age: 70
End: 2025-06-26

## 2025-06-26 NOTE — TELEPHONE ENCOUNTER
Called Patient and reminded patient of their appointment on 07/01/2025 and patient confirmed they would be here. Let pt know they are seeing Michael ROBLES in [facility] due to  no longer traveling. Let pt know that the provider needs them to come in a day prior to appt to get labs drawn to insure they are back in time for appt. Reminded pt to eat well and be well hydrated for their labs to make labs easier to draw. Gave pt office number to call if they can not make appt or has any question.

## 2025-07-01 ENCOUNTER — OFFICE VISIT (OUTPATIENT)
Dept: HEMATOLOGY | Age: 70
End: 2025-07-01
Payer: MEDICARE

## 2025-07-01 VITALS
BODY MASS INDEX: 19.59 KG/M2 | SYSTOLIC BLOOD PRESSURE: 120 MMHG | HEART RATE: 96 BPM | TEMPERATURE: 98 F | RESPIRATION RATE: 18 BRPM | DIASTOLIC BLOOD PRESSURE: 60 MMHG | OXYGEN SATURATION: 97 % | WEIGHT: 97 LBS

## 2025-07-01 DIAGNOSIS — R97.8 ABNORMAL TUMOR MARKERS: ICD-10-CM

## 2025-07-01 DIAGNOSIS — M85.852 OSTEOPENIA OF LEFT HIP: ICD-10-CM

## 2025-07-01 DIAGNOSIS — C50.211 MALIGNANT NEOPLASM OF UPPER-INNER QUADRANT OF RIGHT BREAST IN FEMALE, ESTROGEN RECEPTOR POSITIVE (HCC): Primary | ICD-10-CM

## 2025-07-01 DIAGNOSIS — Z17.0 MALIGNANT NEOPLASM OF UPPER-INNER QUADRANT OF RIGHT BREAST IN FEMALE, ESTROGEN RECEPTOR POSITIVE (HCC): Primary | ICD-10-CM

## 2025-07-01 DIAGNOSIS — F17.200 CURRENT SMOKER: ICD-10-CM

## 2025-07-01 DIAGNOSIS — D72.828 NEUTROPHILIC LEUKOCYTOSIS: ICD-10-CM

## 2025-07-01 DIAGNOSIS — Z51.81 ENCOUNTER FOR MONITORING AROMATASE INHIBITOR THERAPY: ICD-10-CM

## 2025-07-01 DIAGNOSIS — F17.210 CIGARETTE NICOTINE DEPENDENCE, UNCOMPLICATED: ICD-10-CM

## 2025-07-01 DIAGNOSIS — Z79.811 ENCOUNTER FOR MONITORING AROMATASE INHIBITOR THERAPY: ICD-10-CM

## 2025-07-01 PROCEDURE — 1159F MED LIST DOCD IN RCRD: CPT | Performed by: PHYSICIAN ASSISTANT

## 2025-07-01 PROCEDURE — 1123F ACP DISCUSS/DSCN MKR DOCD: CPT | Performed by: PHYSICIAN ASSISTANT

## 2025-07-01 PROCEDURE — G2211 COMPLEX E/M VISIT ADD ON: HCPCS | Performed by: PHYSICIAN ASSISTANT

## 2025-07-01 PROCEDURE — 99214 OFFICE O/P EST MOD 30 MIN: CPT | Performed by: PHYSICIAN ASSISTANT

## 2025-07-01 ASSESSMENT — ENCOUNTER SYMPTOMS
ABDOMINAL PAIN: 0
CONSTIPATION: 0
VOICE CHANGE: 0
WHEEZING: 0
EYE DISCHARGE: 0
EYE ITCHING: 0
TROUBLE SWALLOWING: 0
DIARRHEA: 0
VOMITING: 0
PHOTOPHOBIA: 0
COUGH: 0
SHORTNESS OF BREATH: 0
BACK PAIN: 0
NAUSEA: 0
BLOOD IN STOOL: 0
SORE THROAT: 0
COLOR CHANGE: 0
ABDOMINAL DISTENTION: 0

## 2025-07-02 ENCOUNTER — TELEPHONE (OUTPATIENT)
Dept: VASCULAR SURGERY | Facility: CLINIC | Age: 70
End: 2025-07-02
Payer: MEDICARE

## 2025-07-02 NOTE — TELEPHONE ENCOUNTER
Clyde placed to patient with no answer, voicemail left requesting call back to reschedule missed appointment.

## 2025-07-03 ENCOUNTER — TRANSCRIBE ORDERS (OUTPATIENT)
Dept: ADMINISTRATIVE | Facility: HOSPITAL | Age: 70
End: 2025-07-03
Payer: MEDICARE

## 2025-07-03 ENCOUNTER — TELEPHONE (OUTPATIENT)
Dept: HEMATOLOGY | Age: 70
End: 2025-07-03

## 2025-07-03 DIAGNOSIS — Z17.0 MALIGNANT NEOPLASM OF UPPER-INNER QUADRANT OF RIGHT BREAST IN FEMALE, ESTROGEN RECEPTOR POSITIVE: Primary | ICD-10-CM

## 2025-07-03 DIAGNOSIS — R97.8 ABNORMAL TUMOR MARKERS: ICD-10-CM

## 2025-07-03 DIAGNOSIS — C50.211 MALIGNANT NEOPLASM OF UPPER-INNER QUADRANT OF RIGHT BREAST IN FEMALE, ESTROGEN RECEPTOR POSITIVE: Primary | ICD-10-CM

## 2025-07-07 ENCOUNTER — TELEPHONE (OUTPATIENT)
Dept: OTHER | Age: 70
End: 2025-07-07

## 2025-07-07 NOTE — TELEPHONE ENCOUNTER
Michael Ratliff PA-C ordered a smoking cessation referral due to nicotine dependence. Called pt to offer classes. She requested more information be mailed to her. I verified her home address and advised her to reach out to the number provided if she has any more questions or would like to sign up. Flyer mailed.     Electronically signed by Dotty Rain RN on 7/7/2025 at 12:47 PM

## 2025-07-14 DIAGNOSIS — J44.1 COPD WITH EXACERBATION: ICD-10-CM

## 2025-07-14 DIAGNOSIS — G62.9 NEUROPATHY: ICD-10-CM

## 2025-07-14 NOTE — TELEPHONE ENCOUNTER
Rx Refill Note  Requested Prescriptions     Pending Prescriptions Disp Refills    predniSONE (DELTASONE) 20 MG tablet 35 tablet 0     Si tab Bid x 7 days, 1.5 tab daily for 7 days, 1 tablet daily for 7 days, 1/2 tablet daily for 7 days.      Last office visit with prescribing clinician: 2025   Last telemedicine visit with prescribing clinician: Visit date not found   Next office visit with prescribing clinician: 2025                         Would you like a call back once the refill request has been completed: [] Yes [] No    If the office needs to give you a call back, can they leave a voicemail: [] Yes [] No    Kenia Morales MA  25, 08:50 CDT

## 2025-07-14 NOTE — TELEPHONE ENCOUNTER
Rx Refill Note  Requested Prescriptions     Pending Prescriptions Disp Refills    predniSONE (DELTASONE) 20 MG tablet 35 tablet 0     Si tab Bid x 7 days, 1.5 tab daily for 7 days, 1 tablet daily for 7 days, 1/2 tablet daily for 7 days.    gabapentin (NEURONTIN) 600 MG tablet 90 tablet 0     Sig: Take 1 tablet by mouth 3 (Three) Times a Day.    HYDROcodone-acetaminophen (NORCO)  MG per tablet 90 tablet 0     Sig: Take 1 tablet by mouth Every 8 (Eight) Hours As Needed for Moderate Pain.      Last office visit with prescribing clinician: 2025   Last telemedicine visit with prescribing clinician: Visit date not found   Next office visit with prescribing clinician: 2025                         Would you like a call back once the refill request has been completed: [] Yes [] No    If the office needs to give you a call back, can they leave a voicemail: [] Yes [] No    Kenia Morales MA  25, 08:53 CDT  CONTROLLED SUBSTANCE AGREEMENT - SCAN - [facility]- CONTROLLED SUBSTANCE AGREEMENT  (2025)

## 2025-07-16 RX ORDER — PREDNISONE 20 MG/1
TABLET ORAL
Qty: 35 TABLET | Refills: 0 | Status: SHIPPED | OUTPATIENT
Start: 2025-07-16

## 2025-07-16 RX ORDER — GABAPENTIN 600 MG/1
600 TABLET ORAL 3 TIMES DAILY
Qty: 90 TABLET | Refills: 0 | Status: SHIPPED | OUTPATIENT
Start: 2025-07-16

## 2025-07-16 RX ORDER — HYDROCODONE BITARTRATE AND ACETAMINOPHEN 10; 325 MG/1; MG/1
1 TABLET ORAL EVERY 8 HOURS PRN
Qty: 90 TABLET | Refills: 0 | Status: SHIPPED | OUTPATIENT
Start: 2025-07-16

## 2025-08-13 ENCOUNTER — HOSPITAL ENCOUNTER (OUTPATIENT)
Dept: CT IMAGING | Facility: HOSPITAL | Age: 70
Discharge: HOME | End: 2025-08-13
Payer: MEDICARE

## 2025-08-13 ENCOUNTER — HOSPITAL ENCOUNTER (OUTPATIENT)
Dept: NUCLEAR MEDICINE | Facility: HOSPITAL | Age: 70
Discharge: HOME | End: 2025-08-13
Payer: MEDICARE

## 2025-08-13 DIAGNOSIS — Z17.0 MALIGNANT NEOPLASM OF UPPER-INNER QUADRANT OF RIGHT BREAST IN FEMALE, ESTROGEN RECEPTOR POSITIVE: ICD-10-CM

## 2025-08-13 DIAGNOSIS — C50.211 MALIGNANT NEOPLASM OF UPPER-INNER QUADRANT OF RIGHT BREAST IN FEMALE, ESTROGEN RECEPTOR POSITIVE: ICD-10-CM

## 2025-08-13 DIAGNOSIS — R97.8 ABNORMAL TUMOR MARKERS: ICD-10-CM

## 2025-08-13 LAB — CREAT BLDA-MCNC: 0.9 MG/DL (ref 0.6–1.3)

## 2025-08-13 PROCEDURE — 78306 BONE IMAGING WHOLE BODY: CPT

## 2025-08-13 PROCEDURE — 34310000005 TECHNETIUM MEDRONATE KIT: Performed by: PHYSICIAN ASSISTANT

## 2025-08-13 PROCEDURE — 82565 ASSAY OF CREATININE: CPT

## 2025-08-13 PROCEDURE — 74177 CT ABD & PELVIS W/CONTRAST: CPT

## 2025-08-13 PROCEDURE — A9503 TC99M MEDRONATE: HCPCS | Performed by: PHYSICIAN ASSISTANT

## 2025-08-13 PROCEDURE — 71260 CT THORAX DX C+: CPT

## 2025-08-13 PROCEDURE — 25510000001 IOPAMIDOL PER 1 ML: Performed by: PHYSICIAN ASSISTANT

## 2025-08-13 RX ORDER — IOPAMIDOL 755 MG/ML
100 INJECTION, SOLUTION INTRAVASCULAR
Status: COMPLETED | OUTPATIENT
Start: 2025-08-13 | End: 2025-08-13

## 2025-08-13 RX ORDER — TC 99M MEDRONATE 20 MG/10ML
21.5 INJECTION, POWDER, LYOPHILIZED, FOR SOLUTION INTRAVENOUS
Status: COMPLETED | OUTPATIENT
Start: 2025-08-13 | End: 2025-08-13

## 2025-08-13 RX ADMIN — IOPAMIDOL 100 ML: 755 INJECTION, SOLUTION INTRAVENOUS at 13:07

## 2025-08-13 RX ADMIN — TECHNETIUM TC 99M MEDRONATE 21.5 MILLICURIE: 25 INJECTION, POWDER, FOR SOLUTION INTRAVENOUS at 10:40

## 2025-08-14 DIAGNOSIS — J44.1 COPD WITH EXACERBATION: ICD-10-CM

## 2025-08-14 DIAGNOSIS — G62.9 NEUROPATHY: ICD-10-CM

## 2025-08-15 RX ORDER — GABAPENTIN 600 MG/1
600 TABLET ORAL 3 TIMES DAILY
Qty: 90 TABLET | Refills: 2 | Status: SHIPPED | OUTPATIENT
Start: 2025-08-15

## 2025-08-15 RX ORDER — HYDROCODONE BITARTRATE AND ACETAMINOPHEN 10; 325 MG/1; MG/1
1 TABLET ORAL EVERY 8 HOURS PRN
Qty: 90 TABLET | Refills: 0 | Status: SHIPPED | OUTPATIENT
Start: 2025-08-15

## 2025-08-15 RX ORDER — PREDNISONE 20 MG/1
TABLET ORAL
Qty: 35 TABLET | Refills: 0 | Status: SHIPPED | OUTPATIENT
Start: 2025-08-15

## 2025-08-25 DIAGNOSIS — E11.65 TYPE 2 DIABETES MELLITUS WITH HYPERGLYCEMIA, WITHOUT LONG-TERM CURRENT USE OF INSULIN: ICD-10-CM

## 2025-08-25 DIAGNOSIS — E03.9 HYPOTHYROIDISM, UNSPECIFIED TYPE: ICD-10-CM

## 2025-08-25 RX ORDER — METFORMIN HYDROCHLORIDE 500 MG/1
500 TABLET, EXTENDED RELEASE ORAL
Qty: 90 TABLET | Refills: 2 | Status: SHIPPED | OUTPATIENT
Start: 2025-08-25

## 2025-08-25 RX ORDER — POTASSIUM CHLORIDE 750 MG/1
10 TABLET, EXTENDED RELEASE ORAL DAILY
Qty: 90 TABLET | Refills: 0 | Status: SHIPPED | OUTPATIENT
Start: 2025-08-25

## 2025-08-25 RX ORDER — LEVOTHYROXINE SODIUM 100 UG/1
100 TABLET ORAL DAILY
Qty: 90 TABLET | Refills: 2 | Status: SHIPPED | OUTPATIENT
Start: 2025-08-25

## (undated) DEVICE — MICRO KOVER: Brand: UNBRANDED

## (undated) DEVICE — E-Z CLEAN, NON-STICK, PTFE COATED, ELECTROSURGICAL BLADE ELECTRODE, BAYONET, MODIFIED EXTENDED INSULATION, 6.5 INCH (16.5 CM): Brand: MEGADYNE

## (undated) DEVICE — TIBURON LAPAROTOMY DRAPE: Brand: CONVERTORS

## (undated) DEVICE — STANDARD SURGICAL GOWN, L: Brand: CONVERTORS

## (undated) DEVICE — TOWEL,OR,DSP,ST,BLUE,DLX,4/PK,20PK/CS: Brand: MEDLINE

## (undated) DEVICE — FORCEP BPLR IRIS

## (undated) DEVICE — DISCONTINUED NO SUB IDED TG GLOVE SURG SENSICARE ALOE LT LF PF ST GRN SZ 8

## (undated) DEVICE — BAG BND W36XL36IN TRNSPAR POLY GEN PURP W E BND CLSR TIDI

## (undated) DEVICE — 3M™ STERI-STRIP™ REINFORCED ADHESIVE SKIN CLOSURES, R1547, 1/2 IN X 4 IN (12 MM X 100 MM), 6 STRIPS/ENVELOPE: Brand: 3M™ STERI-STRIP™

## (undated) DEVICE — C-ARMOR C-ARM EQUIPMENT COVERS CLEAR STERILE UNIVERSAL FIT 12 PER CASE: Brand: C-ARMOR

## (undated) DEVICE — NEURO CDS

## (undated) DEVICE — NEEDLE SPNL 22GA L3.5IN BLK HUB S STL REG WALL FIT STYL W/

## (undated) DEVICE — SUTURE VCRL SZ 2-0 L27IN ABSRB VLT L26MM UR-6 5/8 CIR J602H

## (undated) DEVICE — MASTISOL ADHESIVE LIQ 2/3ML

## (undated) DEVICE — KIT POS FOAM ARM CRADL BILAT CHST PD CVR HIP HNG CVR L

## (undated) DEVICE — 3M™ TEGADERM™ TRANSPARENT FILM DRESSING FRAME STYLE, 1626W, 4 IN X 4-3/4 IN (10 CM X 12 CM), 50/CT 4CT/CASE: Brand: 3M™ TEGADERM™

## (undated) DEVICE — E-Z CLEAN, NON-STICK, PTFE COATED, ELECTROSURGICAL BLADE ELECTRODE, MODIFIED EXTENDED INSULATION, 2.5 INCH (6.35 CM): Brand: MEGADYNE

## (undated) DEVICE — TOOL T12MH25L LGD 12CM 2.5MM MATCH LG: Brand: MIDAS REX®

## (undated) DEVICE — GLOVE SURG SZ 75 L12IN FNGR THK94MIL TRNSLUC YEL LTX

## (undated) DEVICE — SUTURE VCRL SZ 3-0 L18IN ABSRB UD L26MM SH 1/2 CIR J864D